# Patient Record
Sex: FEMALE | Race: WHITE | Employment: OTHER | URBAN - METROPOLITAN AREA
[De-identification: names, ages, dates, MRNs, and addresses within clinical notes are randomized per-mention and may not be internally consistent; named-entity substitution may affect disease eponyms.]

---

## 2017-01-12 PROBLEM — G40.909 SEIZURE DISORDER (HCC): Status: ACTIVE | Noted: 2017-01-12

## 2017-01-12 PROBLEM — E03.9 HYPOTHYROIDISM IN ADULT: Status: ACTIVE | Noted: 2017-01-12

## 2017-01-12 PROBLEM — E55.9 VITAMIN D DEFICIENCY: Status: ACTIVE | Noted: 2017-01-12

## 2017-01-12 PROBLEM — Z72.0 TOBACCO USE: Status: ACTIVE | Noted: 2017-01-12

## 2017-01-12 PROBLEM — I10 ESSENTIAL HYPERTENSION: Status: ACTIVE | Noted: 2017-01-12

## 2017-01-12 PROBLEM — K21.9 GASTROESOPHAGEAL REFLUX DISEASE: Status: ACTIVE | Noted: 2017-01-12

## 2017-01-12 PROBLEM — E78.2 MIXED HYPERLIPIDEMIA: Status: ACTIVE | Noted: 2017-01-12

## 2017-01-28 PROBLEM — Z91.51 HISTORY OF SUICIDE ATTEMPT: Status: ACTIVE | Noted: 2017-01-28

## 2018-02-01 PROBLEM — F32.9 DEPRESSION, MAJOR, SINGLE EPISODE: Status: ACTIVE | Noted: 2018-02-01

## 2018-02-13 PROBLEM — F32.A DEPRESSIVE DISORDER: Status: ACTIVE | Noted: 2018-02-13

## 2018-03-15 ENCOUNTER — TELEPHONE (OUTPATIENT)
Dept: FAMILY MEDICINE CLINIC | Age: 48
End: 2018-03-15

## 2018-03-15 ENCOUNTER — HOSPITAL ENCOUNTER (OUTPATIENT)
Age: 48
Discharge: HOME | End: 2018-03-15
Payer: COMMERCIAL

## 2018-03-15 DIAGNOSIS — E87.6 HYPOKALEMIA: Primary | ICD-10-CM

## 2018-03-15 LAB
ANION GAP SERPL CALCULATED.3IONS-SCNC: 21 MMOL/L (ref 7–16)
BUN BLDV-MCNC: 5 MG/DL (ref 6–20)
CALCIUM SERPL-MCNC: 9.3 MG/DL (ref 8.6–10.2)
CHLORIDE BLD-SCNC: 105 MMOL/L (ref 98–107)
CO2: 15 MMOL/L (ref 22–29)
CREAT SERPL-MCNC: 0.6 MG/DL (ref 0.5–1)
GFR AFRICAN AMERICAN: >60
GFR NON-AFRICAN AMERICAN: >60 ML/MIN/1.73
GLUCOSE BLD-MCNC: 152 MG/DL (ref 74–109)
LITHIUM DOSE AMOUNT: NORMAL
LITHIUM LEVEL: 0.95 MMOL/L (ref 0.5–1.5)
POTASSIUM SERPL-SCNC: 3 MMOL/L (ref 3.5–5)
SODIUM BLD-SCNC: 141 MMOL/L (ref 132–146)
T3 FREE: 2.4 PG/ML (ref 2–4.4)
T4 FREE: 1.3 NG/DL (ref 0.93–1.7)
TSH SERPL DL<=0.05 MIU/L-ACNC: 2.38 UIU/ML (ref 0.27–4.2)

## 2018-03-15 PROCEDURE — 80048 BASIC METABOLIC PNL TOTAL CA: CPT

## 2018-03-15 PROCEDURE — 84443 ASSAY THYROID STIM HORMONE: CPT

## 2018-03-15 PROCEDURE — 84481 FREE ASSAY (FT-3): CPT

## 2018-03-15 PROCEDURE — 84439 ASSAY OF FREE THYROXINE: CPT

## 2018-03-15 PROCEDURE — 36415 COLL VENOUS BLD VENIPUNCTURE: CPT

## 2018-03-15 PROCEDURE — 80178 ASSAY OF LITHIUM: CPT

## 2018-03-15 RX ORDER — POTASSIUM CHLORIDE 20 MEQ/1
20 TABLET, EXTENDED RELEASE ORAL DAILY
Qty: 5 TABLET | Refills: 1 | Status: SHIPPED | OUTPATIENT
Start: 2018-03-15 | End: 2018-06-01 | Stop reason: ALTCHOICE

## 2018-03-15 NOTE — TELEPHONE ENCOUNTER
----- Message from Calli Pereira DO sent at 3/15/2018  2:07 PM EDT -----  Regarding: low potassium   Labs were forwarded to me that were ordered by another provider -does not state who this provider is but the patient's potassium is low - she needs replacement potassium.    Just need to see if the provider who ordered these labs has received the results and who that provider is -       ----- Message -----  From: Shanae Lang Incoming Results From [facility]  Sent: 3/15/2018   7:05 AM  To: Calli Pereira DO

## 2018-03-15 NOTE — TELEPHONE ENCOUNTER
Spoke with pt and faxed labs to Dr. Scarlett Valdovinos (psych). Please advise if you plan on addressing the potassium?   I did ask their office to give us a call back if they were going to address. (p# [phone])

## 2018-03-22 ENCOUNTER — TELEPHONE (OUTPATIENT)
Dept: FAMILY MEDICINE CLINIC | Age: 48
End: 2018-03-22

## 2018-03-25 ENCOUNTER — HOSPITAL ENCOUNTER (EMERGENCY)
Age: 48
Discharge: HOME | End: 2018-03-25
Attending: EMERGENCY MEDICINE
Payer: COMMERCIAL

## 2018-03-25 ENCOUNTER — HOSPITAL ENCOUNTER (INPATIENT)
Age: 48
LOS: 2 days | Discharge: HOME | DRG: 885 | End: 2018-03-28
Attending: EMERGENCY MEDICINE | Admitting: PSYCHIATRY & NEUROLOGY
Payer: COMMERCIAL

## 2018-03-25 VITALS
RESPIRATION RATE: 16 BRPM | TEMPERATURE: 98.5 F | HEIGHT: 63 IN | DIASTOLIC BLOOD PRESSURE: 93 MMHG | OXYGEN SATURATION: 97 % | SYSTOLIC BLOOD PRESSURE: 125 MMHG | WEIGHT: 202 LBS | BODY MASS INDEX: 35.79 KG/M2 | HEART RATE: 84 BPM

## 2018-03-25 DIAGNOSIS — F32.A DEPRESSION, UNSPECIFIED DEPRESSION TYPE: Primary | ICD-10-CM

## 2018-03-25 DIAGNOSIS — R45.851 SUICIDAL IDEATION: ICD-10-CM

## 2018-03-25 DIAGNOSIS — R45.851 SUICIDAL IDEATIONS: ICD-10-CM

## 2018-03-25 DIAGNOSIS — T14.91XA SUICIDE ATTEMPT (HCC): Primary | ICD-10-CM

## 2018-03-25 LAB
ACETAMINOPHEN LEVEL: <15 MCG/ML (ref 10–30)
ACETAMINOPHEN LEVEL: <15 MCG/ML (ref 10–30)
ALBUMIN SERPL-MCNC: 4.2 G/DL (ref 3.5–5.2)
ALBUMIN SERPL-MCNC: 4.4 G/DL (ref 3.5–5.2)
ALP BLD-CCNC: 103 U/L (ref 35–104)
ALP BLD-CCNC: 103 U/L (ref 35–104)
ALT SERPL-CCNC: 11 U/L (ref 0–32)
ALT SERPL-CCNC: 8 U/L (ref 0–32)
AMPHETAMINE SCREEN, URINE: NOT DETECTED
AMPHETAMINE SCREEN, URINE: NOT DETECTED
ANION GAP SERPL CALCULATED.3IONS-SCNC: 14 MMOL/L (ref 7–16)
ANION GAP SERPL CALCULATED.3IONS-SCNC: 14 MMOL/L (ref 7–16)
AST SERPL-CCNC: 12 U/L (ref 0–31)
AST SERPL-CCNC: 9 U/L (ref 0–31)
BARBITURATE SCREEN URINE: NOT DETECTED
BARBITURATE SCREEN URINE: NOT DETECTED
BASOPHILS ABSOLUTE: 0.06 E9/L (ref 0–0.2)
BASOPHILS ABSOLUTE: 0.1 E9/L (ref 0–0.2)
BASOPHILS RELATIVE PERCENT: 0.8 % (ref 0–2)
BASOPHILS RELATIVE PERCENT: 0.9 % (ref 0–2)
BENZODIAZEPINE SCREEN, URINE: NOT DETECTED
BENZODIAZEPINE SCREEN, URINE: NOT DETECTED
BILIRUB SERPL-MCNC: 0.3 MG/DL (ref 0–1.2)
BILIRUB SERPL-MCNC: <0.2 MG/DL (ref 0–1.2)
BILIRUBIN URINE: NEGATIVE
BILIRUBIN URINE: NEGATIVE
BLOOD, URINE: NEGATIVE
BLOOD, URINE: NEGATIVE
BUN BLDV-MCNC: 7 MG/DL (ref 6–20)
BUN BLDV-MCNC: 7 MG/DL (ref 6–20)
CALCIUM SERPL-MCNC: 9.5 MG/DL (ref 8.6–10.2)
CALCIUM SERPL-MCNC: 9.7 MG/DL (ref 8.6–10.2)
CANNABINOID SCREEN URINE: NOT DETECTED
CANNABINOID SCREEN URINE: NOT DETECTED
CHLORIDE BLD-SCNC: 101 MMOL/L (ref 98–107)
CHLORIDE BLD-SCNC: 99 MMOL/L (ref 98–107)
CHP ED QC CHECK: YES
CLARITY: CLEAR
CLARITY: CLEAR
CO2: 21 MMOL/L (ref 22–29)
CO2: 23 MMOL/L (ref 22–29)
COCAINE METABOLITE SCREEN URINE: NOT DETECTED
COCAINE METABOLITE SCREEN URINE: NOT DETECTED
COLOR: YELLOW
COLOR: YELLOW
CREAT SERPL-MCNC: 0.6 MG/DL (ref 0.5–1)
CREAT SERPL-MCNC: 0.6 MG/DL (ref 0.5–1)
EKG ATRIAL RATE: 64 BPM
EKG ATRIAL RATE: 69 BPM
EKG P AXIS: 50 DEGREES
EKG P AXIS: 57 DEGREES
EKG P-R INTERVAL: 178 MS
EKG P-R INTERVAL: 192 MS
EKG Q-T INTERVAL: 406 MS
EKG Q-T INTERVAL: 412 MS
EKG QRS DURATION: 90 MS
EKG QRS DURATION: 94 MS
EKG QTC CALCULATION (BAZETT): 425 MS
EKG QTC CALCULATION (BAZETT): 435 MS
EKG R AXIS: 24 DEGREES
EKG R AXIS: 24 DEGREES
EKG T AXIS: 114 DEGREES
EKG T AXIS: 95 DEGREES
EKG VENTRICULAR RATE: 64 BPM
EKG VENTRICULAR RATE: 69 BPM
EOSINOPHILS ABSOLUTE: 0.22 E9/L (ref 0.05–0.5)
EOSINOPHILS ABSOLUTE: 0.32 E9/L (ref 0.05–0.5)
EOSINOPHILS RELATIVE PERCENT: 2.8 % (ref 0–6)
EOSINOPHILS RELATIVE PERCENT: 3 % (ref 0–6)
ETHANOL: <10 MG/DL (ref 0–0.08)
ETHANOL: <10 MG/DL (ref 0–0.08)
GFR AFRICAN AMERICAN: >60
GFR AFRICAN AMERICAN: >60
GFR NON-AFRICAN AMERICAN: >60 ML/MIN/1.73
GFR NON-AFRICAN AMERICAN: >60 ML/MIN/1.73
GLUCOSE BLD-MCNC: 133 MG/DL (ref 74–109)
GLUCOSE BLD-MCNC: 94 MG/DL (ref 74–109)
GLUCOSE URINE: NEGATIVE MG/DL
GLUCOSE URINE: NEGATIVE MG/DL
HCG(URINE) PREGNANCY TEST: NEGATIVE
HCT VFR BLD CALC: 42.5 % (ref 34–48)
HCT VFR BLD CALC: 43.5 % (ref 34–48)
HEMOGLOBIN: 13.6 G/DL (ref 11.5–15.5)
HEMOGLOBIN: 14.1 G/DL (ref 11.5–15.5)
IMMATURE GRANULOCYTES #: 0.03 E9/L
IMMATURE GRANULOCYTES #: 0.04 E9/L
IMMATURE GRANULOCYTES %: 0.4 % (ref 0–5)
IMMATURE GRANULOCYTES %: 0.4 % (ref 0–5)
KETONES, URINE: NEGATIVE MG/DL
KETONES, URINE: NEGATIVE MG/DL
LEUKOCYTE ESTERASE, URINE: NEGATIVE
LEUKOCYTE ESTERASE, URINE: NEGATIVE
LITHIUM DOSE AMOUNT: NORMAL
LITHIUM LEVEL: 1.47 MMOL/L (ref 0.5–1.5)
LYMPHOCYTES ABSOLUTE: 1.73 E9/L (ref 1.5–4)
LYMPHOCYTES ABSOLUTE: 3.34 E9/L (ref 1.5–4)
LYMPHOCYTES RELATIVE PERCENT: 22.3 % (ref 20–42)
LYMPHOCYTES RELATIVE PERCENT: 31.4 % (ref 20–42)
MCH RBC QN AUTO: 30 PG (ref 26–35)
MCH RBC QN AUTO: 30.2 PG (ref 26–35)
MCHC RBC AUTO-ENTMCNC: 32 % (ref 32–34.5)
MCHC RBC AUTO-ENTMCNC: 32.4 % (ref 32–34.5)
MCV RBC AUTO: 93.1 FL (ref 80–99.9)
MCV RBC AUTO: 93.8 FL (ref 80–99.9)
METHADONE SCREEN, URINE: NOT DETECTED
METHADONE SCREEN, URINE: NOT DETECTED
MONOCYTES ABSOLUTE: 0.36 E9/L (ref 0.1–0.95)
MONOCYTES ABSOLUTE: 0.78 E9/L (ref 0.1–0.95)
MONOCYTES RELATIVE PERCENT: 4.6 % (ref 2–12)
MONOCYTES RELATIVE PERCENT: 7.3 % (ref 2–12)
NEUTROPHILS ABSOLUTE: 5.36 E9/L (ref 1.8–7.3)
NEUTROPHILS ABSOLUTE: 6.07 E9/L (ref 1.8–7.3)
NEUTROPHILS RELATIVE PERCENT: 57 % (ref 43–80)
NEUTROPHILS RELATIVE PERCENT: 69.1 % (ref 43–80)
NITRITE, URINE: NEGATIVE
NITRITE, URINE: NEGATIVE
OPIATE SCREEN URINE: NOT DETECTED
OPIATE SCREEN URINE: NOT DETECTED
PDW BLD-RTO: 13 FL (ref 11.5–15)
PDW BLD-RTO: 13.2 FL (ref 11.5–15)
PH UA: 5.5 (ref 5–9)
PH UA: 6.5 (ref 5–9)
PHENCYCLIDINE SCREEN URINE: NOT DETECTED
PHENCYCLIDINE SCREEN URINE: NOT DETECTED
PLATELET # BLD: 386 E9/L (ref 130–450)
PLATELET # BLD: 415 E9/L (ref 130–450)
PMV BLD AUTO: 9.4 FL (ref 7–12)
PMV BLD AUTO: 9.4 FL (ref 7–12)
POTASSIUM SERPL-SCNC: 3.9 MMOL/L (ref 3.5–5)
POTASSIUM SERPL-SCNC: 4.3 MMOL/L (ref 3.5–5)
PREGNANCY TEST URINE, POC: NEGATIVE
PROPOXYPHENE SCREEN: NOT DETECTED
PROPOXYPHENE SCREEN: NOT DETECTED
PROTEIN UA: NEGATIVE MG/DL
PROTEIN UA: NEGATIVE MG/DL
RBC # BLD: 4.53 E12/L (ref 3.5–5.5)
RBC # BLD: 4.67 E12/L (ref 3.5–5.5)
SALICYLATE, SERUM: <0.3 MG/DL (ref 0–30)
SALICYLATE, SERUM: <0.3 MG/DL (ref 0–30)
SODIUM BLD-SCNC: 134 MMOL/L (ref 132–146)
SODIUM BLD-SCNC: 138 MMOL/L (ref 132–146)
SPECIFIC GRAVITY UA: <=1.005 (ref 1–1.03)
SPECIFIC GRAVITY UA: <=1.005 (ref 1–1.03)
T4 TOTAL: 8.1 MCG/DL (ref 4.5–11.7)
T4 TOTAL: 8.3 MCG/DL (ref 4.5–11.7)
TOTAL PROTEIN: 7 G/DL (ref 6.4–8.3)
TOTAL PROTEIN: 7.3 G/DL (ref 6.4–8.3)
TRICYCLIC ANTIDEPRESSANTS SCREEN SERUM: NEGATIVE NG/ML
TRICYCLIC ANTIDEPRESSANTS SCREEN SERUM: NEGATIVE NG/ML
TSH SERPL DL<=0.05 MIU/L-ACNC: 1.39 UIU/ML (ref 0.27–4.2)
TSH SERPL DL<=0.05 MIU/L-ACNC: 2.4 UIU/ML (ref 0.27–4.2)
UROBILINOGEN, URINE: 0.2 E.U./DL
UROBILINOGEN, URINE: 0.2 E.U./DL
WBC # BLD: 10.7 E9/L (ref 4.5–11.5)
WBC # BLD: 7.8 E9/L (ref 4.5–11.5)

## 2018-03-25 PROCEDURE — 80053 COMPREHEN METABOLIC PANEL: CPT

## 2018-03-25 PROCEDURE — 85025 COMPLETE CBC W/AUTO DIFF WBC: CPT

## 2018-03-25 PROCEDURE — 81003 URINALYSIS AUTO W/O SCOPE: CPT

## 2018-03-25 PROCEDURE — 36415 COLL VENOUS BLD VENIPUNCTURE: CPT

## 2018-03-25 PROCEDURE — 93005 ELECTROCARDIOGRAM TRACING: CPT | Performed by: NURSE PRACTITIONER

## 2018-03-25 PROCEDURE — 80307 DRUG TEST PRSMV CHEM ANLYZR: CPT

## 2018-03-25 PROCEDURE — 84436 ASSAY OF TOTAL THYROXINE: CPT

## 2018-03-25 PROCEDURE — 81025 URINE PREGNANCY TEST: CPT

## 2018-03-25 PROCEDURE — 84443 ASSAY THYROID STIM HORMONE: CPT

## 2018-03-25 PROCEDURE — 99285 EMERGENCY DEPT VISIT HI MDM: CPT

## 2018-03-25 PROCEDURE — 6370000000 HC RX 637 (ALT 250 FOR IP): Performed by: EMERGENCY MEDICINE

## 2018-03-25 PROCEDURE — 80178 ASSAY OF LITHIUM: CPT

## 2018-03-25 PROCEDURE — 6370000000 HC RX 637 (ALT 250 FOR IP): Performed by: NURSE PRACTITIONER

## 2018-03-25 RX ORDER — CARVEDILOL 6.25 MG/1
3.12 TABLET ORAL ONCE
Status: COMPLETED | OUTPATIENT
Start: 2018-03-25 | End: 2018-03-25

## 2018-03-25 RX ORDER — DOCUSATE SODIUM 100 MG/1
100 CAPSULE, LIQUID FILLED ORAL DAILY
COMMUNITY
End: 2018-03-25

## 2018-03-25 RX ORDER — HYDROXYZINE 50 MG/1
50 TABLET, FILM COATED ORAL EVERY 8 HOURS PRN
Status: ON HOLD | COMMUNITY
End: 2019-07-08 | Stop reason: HOSPADM

## 2018-03-25 RX ORDER — LORAZEPAM 1 MG/1
2 TABLET ORAL ONCE
Status: COMPLETED | OUTPATIENT
Start: 2018-03-25 | End: 2018-03-25

## 2018-03-25 RX ORDER — LITHIUM CARBONATE 300 MG/1
600 CAPSULE ORAL ONCE
Status: COMPLETED | OUTPATIENT
Start: 2018-03-25 | End: 2018-03-25

## 2018-03-25 RX ORDER — LORATADINE 10 MG/1
10 TABLET ORAL DAILY
COMMUNITY
End: 2018-03-25

## 2018-03-25 RX ADMIN — LITHIUM CARBONATE 600 MG: 300 CAPSULE ORAL at 23:07

## 2018-03-25 RX ADMIN — LORAZEPAM 2 MG: 1 TABLET ORAL at 19:03

## 2018-03-25 RX ADMIN — CARVEDILOL 3.12 MG: 6.25 TABLET, FILM COATED ORAL at 20:35

## 2018-03-25 ASSESSMENT — PATIENT HEALTH QUESTIONNAIRE - PHQ9: SUM OF ALL RESPONSES TO PHQ QUESTIONS 1-9: 4

## 2018-03-25 NOTE — ED NOTES
Assessment, CSSR & SBIRT completed. Pt is a 53 yo female who arrived via ambulance, is on a pink slip completed by [facility] and is accompanied by no one. Pt was treated earlier this date in ED for suicidal ideation w/ thoughts of stabbing herself with a  knife, evaluated and discharged to group home. Pt reports when she returned home there was another person at the group talking about  dying. Pt reports her  is also  and she began to be upset. She then began to think about her father dying last July and suicidal thoughts began again strongly. Pt voiced these to caregiver at the home and began to talk about stabbing herself with the  knife again. Police were called but when they arrived pt ran upstairs and grabbed a pillow case and was attempting to hang/strangle herself, combative with police when they attempted to stop her. Active with [facility], sees Aspen Murphy, reports recently he has been adjusting her medication recently discontinuing oral Haldol. Hx of schizoaffective disorder, borderline personality disorder. Hx on [facility], last was 2018. Alert, oriented x3, mood reported as depressed, affect flat, eye contact good, speech is soft but normal in rate and flow, behavior is cooperative, appropriate, no signs of agitation, denies acute psychosis A/V hallucinations, delusions, paranoia at this time, none noted in interview, does report suicidal ideation with plan to stab self, reports at times these thoughts are strong and she is unsure she can control them. Denies homicidal ideation intent or plan. Discussion with Dr Jefferson Platt, reports that since pt had episode earlier this date of suicidal thought/thoughts of stabbing self, and considering events leading to current admission he is uncomfortable with discharge. SW will pursue in-patient treatment.         [address], [place], [place]  18

## 2018-03-25 NOTE — PROGRESS NOTES
Spoke with dr. Sridhar Yanes to overturn pink slip he did not feel comfortable doing so since he did not know pt. Will call dr. Gerhard Mcduffie.

## 2018-03-25 NOTE — PROGRESS NOTES
spoke with cassandra from [facility]'s pharmacy verified her home medications, also checked with [facility] they stated pt. Was not there today. Denies suicidal ideation, denies homicidal ideation, and denies hallucinations.

## 2018-03-25 NOTE — ED PROVIDER NOTES
ED Attending  CC: Madhuri       Department of Emergency Medicine   ED  Provider Note  Admit Date/RoomTime: 3/25/2018  8:50 AM  ED Room: [address] [place] [address]   Chief Complaint   Mental Health Problem (pt. states she has thoughts of suicide to cut herself with a  knife, denies homicidal ideatioon, and denies hallucinations)    History of Present Illness   Source of history provided by:  patient. History/Exam Limitations: none. Alto Gowers is a 52 y.o. old female who has a past medical history of:   Past Medical History:   Diagnosis Date    B[facility] disorder ([facility]     Borderline personality disorder in adolescent     Depression     Hyperlipidemia     Hypertension     PTSD (post-traumatic stress disorder)     Schizo affective schizophrenia ([facility]     Seizures ([facility]     Thyroid disease     presents to the emergency department by ambulance where the patient received see Ambulance Run Sheet prior to arrival., for psychiatric evaluation/medical clearance and depression which began several year(s) prior to arrival.  She has a prior history of depression of which the problem is long-standing. Her reported drug use history: Never. She  is currently in counseling. There has been no history of recent trauma . She admits to suicidal ideation with a plan to slit her throat with a  knife and denies homicidal ideation. Quality:      Hopelessness:   Yes. Terror:   No.     Confusion:   No.     Hallucinations:   None. Able to care for self: No.  Able to control self: Yes. ROS    Pertinent positives and negatives are stated within HPI, all other systems reviewed and are negative. Past Surgical History:  has a past surgical history that includes joint replacement; Cholecystectomy; and shoulder surgery. Social History:  reports that she has been smoking. She has a 3.75 pack-year smoking history. She has never used smokeless tobacco. She reports that she does not drink alcohol or use drugs.   Family History: family history includes High Blood Pressure in her mother; No Known Problems in her father. Allergies: Adhesive tape    Physical Exam           ED Triage Vitals [03/25/18 0905]   BP Temp Temp Source Pulse Resp SpO2 Height Weight   (!) 125/93 98.5 °F (36.9 °C) Tympanic 84 16 97 % 5' 3\" (1.6 m) 202 lb (91.6 kg)      Oxygen Saturation Interpretation: Normal.    General Appearance:  hospital attire, seated in bed and holding a stuffed turtle. Constitutional:   Level of Consciousness: Awake and alert. ETOH: No.          Distress: none. Cooperativeness: cooperative. Eyes:  PERRL, EOMI, no discharge or conjunctival injection. Ears:  External ears without lesions. Throat:  Pharynx without injection, exudate, or tonsillar hypertrophy. Airway patient. Neck:  Normal ROM. Supple. Respiratory:  Clear to auscultation and breath sounds equal.  CV:  Regular rate and rhythm, normal heart sounds, without pathological murmurs, ectopy, gallops, or rubs. GI:  Abdomen Soft, nontender, good bowel sounds. No firm or pulsatile mass. Back:  No costovertebral tenderness. Integument:  Normal turgor. Warm, dry, without visible rash, unless noted elsewhere. Lymphatics: No lymphangitis or adenopathy noted. Neurological:  Oriented. Motor functions intact. Psychiatric:        Thought Process:       Coherent:  Yes. Delusions / Paranoia: no evidence of paranoia. Flight of ideas:  No.         Rambling conversation:  No.       Affect: depressed, quiet and flat. Suicidal ideation:  suicidal ideation with clear plan and intent. Homicidal ideation:  no homicidal ideation. Perceptions:  denies any perceptual disturbance present. Insight: below average. Judgement: below average.     Lab / Imaging Results   (All laboratory and radiology results have been personally reviewed by myself)  Labs:  Results for orders placed or performed during the hospital encounter of 03/25/18   CBC auto differential   Result Value Ref Range    WBC 7.8 4.5 - 11.5 E9/L    RBC 4.67 3.50 - 5.50 E12/L    Hemoglobin 14.1 11.5 - 15.5 g/dL    Hematocrit 43.5 34.0 - 48.0 %    MCV 93.1 80.0 - 99.9 fL    MCH 30.2 26.0 - 35.0 pg    MCHC 32.4 32.0 - 34.5 %    RDW 13.2 11.5 - 15.0 fL    Platelets 026 940 - 140 E9/L    MPV 9.4 7.0 - 12.0 fL    Neutrophils % 69.1 43.0 - 80.0 %    Immature Granulocytes % 0.4 0.0 - 5.0 %    Lymphocytes % 22.3 20.0 - 42.0 %    Monocytes % 4.6 2.0 - 12.0 %    Eosinophils % 2.8 0.0 - 6.0 %    Basophils % 0.8 0.0 - 2.0 %    Neutrophils # 5.36 1.80 - 7.30 E9/L    Immature Granulocytes # 0.03 E9/L    Lymphocytes # 1.73 1.50 - 4.00 E9/L    Monocytes # 0.36 0.10 - 0.95 E9/L    Eosinophils # 0.22 0.05 - 0.50 E9/L    Basophils # 0.06 0.00 - 0.20 E9/L   Comprehensive Metabolic Panel   Result Value Ref Range    Sodium 138 132 - 146 mmol/L    Potassium 4.3 3.5 - 5.0 mmol/L    Chloride 101 98 - 107 mmol/L    CO2 23 22 - 29 mmol/L    Anion Gap 14 7 - 16 mmol/L    Glucose 133 (H) 74 - 109 mg/dL    BUN 7 6 - 20 mg/dL    CREATININE 0.6 0.5 - 1.0 mg/dL    GFR Non-African American >60 >=60 mL/min/1.73    GFR African American >60     Calcium 9.7 8.6 - 10.2 mg/dL    Total Protein 7.3 6.4 - 8.3 g/dL    Alb 4.4 3.5 - 5.2 g/dL    Total Bilirubin 0.3 0.0 - 1.2 mg/dL    Alkaline Phosphatase 103 35 - 104 U/L    ALT 8 0 - 32 U/L    AST 9 0 - 31 U/L   TSH without Reflex   Result Value Ref Range    TSH 1.390 0.270 - 4.200 uIU/mL   Urinalysis   Result Value Ref Range    Color, UA Yellow Straw/Yellow    Clarity, UA Clear Clear    Glucose, Ur Negative Negative mg/dL    Bilirubin Urine Negative Negative    Ketones, Urine Negative Negative mg/dL    Specific Gravity, UA <=1.005 1.005 - 1.030    Blood, Urine Negative Negative    pH, UA 6.5 5.0 - 9.0    Protein, UA Negative Negative mg/dL    Urobilinogen, Urine 0.2 <2.0 E.U./dL    Nitrite, Urine Negative Negative    Leukocyte Esterase, Urine Negative Negative   Serum addition to providing specific details for the plan of care and counseling regarding the diagnosis and prognosis. Questions are answered at this time and they are agreeable with the plan    Assessment      1. Depression, unspecified depression type    2. Suicidal ideations      Plan   Per psych  Patient condition is good    New Medications     New Prescriptions    No medications on file     Electronically signed by Dione Gu CNP   DD: 3/25/18  **This report was transcribed using voice recognition software. Every effort was made to ensure accuracy; however, inadvertent computerized transcription errors may be present.   END OF ED PROVIDER NOTE       Dione Gu CNP  03/25/18 1110

## 2018-03-25 NOTE — ED PROVIDER NOTES
are negative. Past Surgical History:  has a past surgical history that includes joint replacement; Cholecystectomy; and shoulder surgery. Social History:  reports that she has been smoking. She has a 3.75 pack-year smoking history. She has never used smokeless tobacco. She reports that she does not drink alcohol or use drugs. Family History: family history includes High Blood Pressure in her mother; No Known Problems in her father. Allergies: Adhesive tape    Physical Exam           ED Triage Vitals [03/25/18 1622]   BP Temp Temp src Pulse Resp SpO2 Height Weight   (!) 146/97 97.3 °F (36.3 °C) -- 78 16 96 % -- --      Oxygen Saturation Interpretation: Normal.    General Appearance:  well-appearing, street clothes, seated in bed, fair grooming and fair hygiene. Constitutional:   Level of Consciousness: Awake and alert. ETOH: No.          Distress: none. Cooperativeness: cooperative. Eyes:  PERRL, EOMI, no discharge or conjunctival injection. Ears:  External ears without lesions. Throat:  Pharynx without injection, exudate, or tonsillar hypertrophy. Airway patient. No pulled secretions in the oropharynx. She is phonating normally. Neck:  Normal ROM. No pain with axial loading. There is no midline vertebral tenderness through the cervical spine. There is no auscultated stridor over the trachea. No carotid bruits bilaterally. Strong carotid pulse bilaterally. Supple. There is trace erythema to the anterior mid neck, none to the lateral or posterior. No ecchymosis. Respiratory:  Clear to auscultation and breath sounds equal. No respiratory distress or increased work of breathing. CV:  Regular rate and rhythm, normal heart sounds, without pathological murmurs, ectopy, gallops, or rubs. No tachycardia. GI:  Abdomen Soft, nontender, good bowel sounds. No firm or pulsatile mass. Back:  No costovertebral tenderness. Integument:  Normal turgor.   Warm, dry, without visible rash, unless noted elsewhere. Lymphatics: No lymphangitis or adenopathy noted. Neurological:  Oriented. Motor functions intact. Grasp, dorsiflexion and plantar flexion strong and equal bilaterally. Clear speech. Psychiatric:        Thought Process:       Coherent:  Yes. Delusions / Paranoia: paranoid. Flight of ideas:  No.         Rambling conversation:  No.       Affect: anxious, fearful and labile. Suicidal ideation:  suicidal ideation with clear plan and intent. Homicidal ideation:  no homicidal ideation. Perceptions:  auditory present. Insight: below average. Judgement: below average.     Lab / Imaging Results   (All laboratory and radiology results have been personally reviewed by myself)  Labs:  Results for orders placed or performed during the hospital encounter of 03/25/18   Comprehensive Metabolic Panel   Result Value Ref Range    Sodium 134 132 - 146 mmol/L    Potassium 3.9 3.5 - 5.0 mmol/L    Chloride 99 98 - 107 mmol/L    CO2 21 (L) 22 - 29 mmol/L    Anion Gap 14 7 - 16 mmol/L    Glucose 94 74 - 109 mg/dL    BUN 7 6 - 20 mg/dL    CREATININE 0.6 0.5 - 1.0 mg/dL    GFR Non-African American >60 >=60 mL/min/1.73    GFR African American >60     Calcium 9.5 8.6 - 10.2 mg/dL    Total Protein 7.0 6.4 - 8.3 g/dL    Alb 4.2 3.5 - 5.2 g/dL    Total Bilirubin <0.2 0.0 - 1.2 mg/dL    Alkaline Phosphatase 103 35 - 104 U/L    ALT 11 0 - 32 U/L    AST 12 0 - 31 U/L   CBC Auto Differential   Result Value Ref Range    WBC 10.7 4.5 - 11.5 E9/L    RBC 4.53 3.50 - 5.50 E12/L    Hemoglobin 13.6 11.5 - 15.5 g/dL    Hematocrit 42.5 34.0 - 48.0 %    MCV 93.8 80.0 - 99.9 fL    MCH 30.0 26.0 - 35.0 pg    MCHC 32.0 32.0 - 34.5 %    RDW 13.0 11.5 - 15.0 fL    Platelets 815 550 - 217 E9/L    MPV 9.4 7.0 - 12.0 fL    Neutrophils % 57.0 43.0 - 80.0 %    Immature Granulocytes % 0.4 0.0 - 5.0 %    Lymphocytes % 31.4 20.0 - 42.0 %    Monocytes % 7.3 2.0 - 12.0 %    Eosinophils % 3.0 0.0 - 6.0 %

## 2018-03-25 NOTE — PROGRESS NOTES
Denies suicidal ideation, denies homicidal ideation, and denies hallucinations. Discharge instructions given to and explained to pt. She verbalizes intent to follow discharge instructions and has appointments already made, positive plans for discharge.

## 2018-03-25 NOTE — ED NOTES
Per , patient denies SI/HI. Called group home, confirmed there are no knives available to people in group home.  also confirmed with group home that it is safe to send her back. Poses no threat to self or others at this time.       Raiza Medel, DO  03/25/18 3020

## 2018-03-26 PROCEDURE — 1240000000 HC EMOTIONAL WELLNESS R&B

## 2018-03-26 PROCEDURE — 6370000000 HC RX 637 (ALT 250 FOR IP): Performed by: EMERGENCY MEDICINE

## 2018-03-26 PROCEDURE — 6370000000 HC RX 637 (ALT 250 FOR IP): Performed by: PSYCHIATRY & NEUROLOGY

## 2018-03-26 RX ORDER — LEVOTHYROXINE SODIUM 0.1 MG/1
100 TABLET ORAL ONCE
Status: COMPLETED | OUTPATIENT
Start: 2018-03-26 | End: 2018-03-26

## 2018-03-26 RX ORDER — ATORVASTATIN CALCIUM 20 MG/1
20 TABLET, FILM COATED ORAL DAILY
Status: DISCONTINUED | OUTPATIENT
Start: 2018-03-26 | End: 2018-03-28 | Stop reason: HOSPADM

## 2018-03-26 RX ORDER — POTASSIUM CHLORIDE 20 MEQ/1
20 TABLET, EXTENDED RELEASE ORAL DAILY
Status: DISCONTINUED | OUTPATIENT
Start: 2018-03-26 | End: 2018-03-28 | Stop reason: HOSPADM

## 2018-03-26 RX ORDER — POTASSIUM CHLORIDE 20 MEQ/1
20 TABLET, EXTENDED RELEASE ORAL ONCE
Status: COMPLETED | OUTPATIENT
Start: 2018-03-26 | End: 2018-03-26

## 2018-03-26 RX ORDER — VENLAFAXINE HYDROCHLORIDE 75 MG/1
225 CAPSULE, EXTENDED RELEASE ORAL ONCE
Status: COMPLETED | OUTPATIENT
Start: 2018-03-26 | End: 2018-03-26

## 2018-03-26 RX ORDER — TOPIRAMATE 100 MG/1
200 TABLET, FILM COATED ORAL 2 TIMES DAILY
Status: DISCONTINUED | OUTPATIENT
Start: 2018-03-26 | End: 2018-03-28 | Stop reason: HOSPADM

## 2018-03-26 RX ORDER — CARVEDILOL 3.12 MG/1
3.12 TABLET ORAL 2 TIMES DAILY
Status: DISCONTINUED | OUTPATIENT
Start: 2018-03-26 | End: 2018-03-28 | Stop reason: HOSPADM

## 2018-03-26 RX ORDER — LEVOTHYROXINE SODIUM 0.1 MG/1
100 TABLET ORAL DAILY
Status: DISCONTINUED | OUTPATIENT
Start: 2018-03-26 | End: 2018-03-28 | Stop reason: HOSPADM

## 2018-03-26 RX ORDER — LITHIUM CARBONATE 300 MG/1
600 CAPSULE ORAL 2 TIMES DAILY WITH MEALS
Status: DISCONTINUED | OUTPATIENT
Start: 2018-03-26 | End: 2018-03-28 | Stop reason: HOSPADM

## 2018-03-26 RX ORDER — BENZTROPINE MESYLATE 0.5 MG/1
0.5 TABLET ORAL ONCE
Status: COMPLETED | OUTPATIENT
Start: 2018-03-26 | End: 2018-03-26

## 2018-03-26 RX ORDER — TOPIRAMATE 100 MG/1
200 TABLET, FILM COATED ORAL ONCE
Status: COMPLETED | OUTPATIENT
Start: 2018-03-26 | End: 2018-03-26

## 2018-03-26 RX ORDER — LITHIUM CARBONATE 300 MG/1
600 CAPSULE ORAL ONCE
Status: COMPLETED | OUTPATIENT
Start: 2018-03-26 | End: 2018-03-26

## 2018-03-26 RX ORDER — PANTOPRAZOLE SODIUM 40 MG/1
40 TABLET, DELAYED RELEASE ORAL ONCE
Status: COMPLETED | OUTPATIENT
Start: 2018-03-26 | End: 2018-03-26

## 2018-03-26 RX ORDER — CARVEDILOL 3.12 MG/1
3.12 TABLET ORAL ONCE
Status: COMPLETED | OUTPATIENT
Start: 2018-03-26 | End: 2018-03-26

## 2018-03-26 RX ORDER — BENZTROPINE MESYLATE 0.5 MG/1
0.5 TABLET ORAL 3 TIMES DAILY
Status: DISCONTINUED | OUTPATIENT
Start: 2018-03-26 | End: 2018-03-28 | Stop reason: HOSPADM

## 2018-03-26 RX ORDER — PANTOPRAZOLE SODIUM 40 MG/1
40 TABLET, DELAYED RELEASE ORAL
Status: DISCONTINUED | OUTPATIENT
Start: 2018-03-27 | End: 2018-03-28 | Stop reason: HOSPADM

## 2018-03-26 RX ADMIN — LITHIUM CARBONATE 600 MG: 300 CAPSULE ORAL at 19:04

## 2018-03-26 RX ADMIN — POTASSIUM CHLORIDE 20 MEQ: 20 TABLET, EXTENDED RELEASE ORAL at 14:57

## 2018-03-26 RX ADMIN — TOPIRAMATE 200 MG: 100 TABLET, FILM COATED ORAL at 20:12

## 2018-03-26 RX ADMIN — LITHIUM CARBONATE 600 MG: 300 CAPSULE ORAL at 17:11

## 2018-03-26 RX ADMIN — PANTOPRAZOLE SODIUM 40 MG: 40 TABLET, DELAYED RELEASE ORAL at 14:57

## 2018-03-26 RX ADMIN — CARVEDILOL 3.12 MG: 3.12 TABLET, FILM COATED ORAL at 20:12

## 2018-03-26 RX ADMIN — LEVOTHYROXINE SODIUM 100 MCG: 100 TABLET ORAL at 14:57

## 2018-03-26 RX ADMIN — VENLAFAXINE HYDROCHLORIDE 225 MG: 75 CAPSULE, EXTENDED RELEASE ORAL at 17:13

## 2018-03-26 RX ADMIN — ATORVASTATIN CALCIUM 20 MG: 20 TABLET, FILM COATED ORAL at 19:04

## 2018-03-26 RX ADMIN — BENZTROPINE MESYLATE 0.5 MG: 0.5 TABLET ORAL at 17:11

## 2018-03-26 RX ADMIN — TOPIRAMATE 200 MG: 100 TABLET, FILM COATED ORAL at 17:11

## 2018-03-26 RX ADMIN — POTASSIUM CHLORIDE 20 MEQ: 20 TABLET, EXTENDED RELEASE ORAL at 19:04

## 2018-03-26 RX ADMIN — BENZTROPINE MESYLATE 0.5 MG: 0.5 TABLET ORAL at 20:12

## 2018-03-26 RX ADMIN — CARVEDILOL 3.12 MG: 3.12 TABLET, FILM COATED ORAL at 17:12

## 2018-03-26 ASSESSMENT — SLEEP AND FATIGUE QUESTIONNAIRES
DO YOU USE A SLEEP AID: NO
SLEEP PATTERN: NORMAL
AVERAGE NUMBER OF SLEEP HOURS: 7
DO YOU HAVE DIFFICULTY SLEEPING: NO

## 2018-03-26 ASSESSMENT — PATIENT HEALTH QUESTIONNAIRE - PHQ9: SUM OF ALL RESPONSES TO PHQ QUESTIONS 1-9: 9

## 2018-03-26 ASSESSMENT — PAIN SCALES - GENERAL: PAINLEVEL_OUTOF10: 0

## 2018-03-26 ASSESSMENT — LIFESTYLE VARIABLES: HISTORY_ALCOHOL_USE: NO

## 2018-03-26 NOTE — ED NOTES
Per CEDRICK FÉLIX Dennis, patient is accepted to [facility] by Dr. Selena Adhikari. Patient to go to room #75B. Faxed pink slip to [facility]. Called admitting and notified Melonie Thapa. Placed call to [facility] and spoke to [facility] - closed case for placement.      [place] Sethi, [facility]  03/26/18 [address], [place]  03/26/18 5254

## 2018-03-27 VITALS
BODY MASS INDEX: 35.79 KG/M2 | OXYGEN SATURATION: 98 % | TEMPERATURE: 98.7 F | SYSTOLIC BLOOD PRESSURE: 130 MMHG | HEART RATE: 73 BPM | RESPIRATION RATE: 16 BRPM | DIASTOLIC BLOOD PRESSURE: 87 MMHG | HEIGHT: 63 IN | WEIGHT: 202 LBS

## 2018-03-27 PROBLEM — F25.0 SCHIZOAFFECTIVE DISORDER, BIPOLAR TYPE (HCC): Status: ACTIVE | Noted: 2018-03-27

## 2018-03-27 PROCEDURE — 1240000000 HC EMOTIONAL WELLNESS R&B

## 2018-03-27 PROCEDURE — 6370000000 HC RX 637 (ALT 250 FOR IP): Performed by: PSYCHIATRY & NEUROLOGY

## 2018-03-27 PROCEDURE — 99221 1ST HOSP IP/OBS SF/LOW 40: CPT | Performed by: PSYCHIATRY & NEUROLOGY

## 2018-03-27 RX ADMIN — PANTOPRAZOLE SODIUM 40 MG: 40 TABLET, DELAYED RELEASE ORAL at 06:59

## 2018-03-27 RX ADMIN — POTASSIUM CHLORIDE 20 MEQ: 20 TABLET, EXTENDED RELEASE ORAL at 09:02

## 2018-03-27 RX ADMIN — LITHIUM CARBONATE 600 MG: 300 CAPSULE ORAL at 16:57

## 2018-03-27 RX ADMIN — TOPIRAMATE 200 MG: 100 TABLET, FILM COATED ORAL at 21:20

## 2018-03-27 RX ADMIN — TOPIRAMATE 200 MG: 100 TABLET, FILM COATED ORAL at 09:02

## 2018-03-27 RX ADMIN — CARVEDILOL 3.12 MG: 3.12 TABLET, FILM COATED ORAL at 09:02

## 2018-03-27 RX ADMIN — VENLAFAXINE HYDROCHLORIDE 225 MG: 150 CAPSULE, EXTENDED RELEASE ORAL at 09:02

## 2018-03-27 RX ADMIN — ATORVASTATIN CALCIUM 20 MG: 20 TABLET, FILM COATED ORAL at 21:20

## 2018-03-27 RX ADMIN — BENZTROPINE MESYLATE 0.5 MG: 0.5 TABLET ORAL at 21:20

## 2018-03-27 RX ADMIN — CARVEDILOL 3.12 MG: 3.12 TABLET, FILM COATED ORAL at 21:20

## 2018-03-27 RX ADMIN — LITHIUM CARBONATE 600 MG: 300 CAPSULE ORAL at 09:02

## 2018-03-27 RX ADMIN — BENZTROPINE MESYLATE 0.5 MG: 0.5 TABLET ORAL at 09:02

## 2018-03-27 RX ADMIN — BENZTROPINE MESYLATE 0.5 MG: 0.5 TABLET ORAL at 13:30

## 2018-03-27 RX ADMIN — VITAMIN D, TAB 1000IU (100/BT) 2000 UNITS: 25 TAB at 13:30

## 2018-03-27 RX ADMIN — LEVOTHYROXINE SODIUM 100 MCG: 100 TABLET ORAL at 06:59

## 2018-03-27 ASSESSMENT — SLEEP AND FATIGUE QUESTIONNAIRES
AVERAGE NUMBER OF SLEEP HOURS: 7
SLEEP PATTERN: NORMAL
DO YOU HAVE DIFFICULTY SLEEPING: NO
DO YOU USE A SLEEP AID: NO

## 2018-03-27 ASSESSMENT — LIFESTYLE VARIABLES: HISTORY_ALCOHOL_USE: NO

## 2018-03-27 ASSESSMENT — PAIN SCALES - GENERAL
PAINLEVEL_OUTOF10: 0
PAINLEVEL_OUTOF10: 0
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
## 2018-03-27 NOTE — PLAN OF CARE
[facility]  Initial Interdisciplinary Treatment Plan NOTE    Review Date & Time: 3/27/18 0900    Patient was not in INITIAL treatment team    Admission Type:   Admission Type:  Involuntary    Reason for admission:  Reason for Admission: depression       Estimated Length of Stay Update:  3 DAYS  Estimated Discharge Date Update:  3/30/18    PATIENT STRENGTHS:  Patient Strengths Strengths: Medication Compliance, Motivated  Patient Strengths and Limitations:Limitations: Tendency to isolate self  Addictive Behavior:Addictive Behavior  In the past 3 months, have you felt or has someone told you that you have a problem with:  : None  Do you have a history of Chemical Use?: No  Do you have a history of Alcohol Use?: No  Do you have a history of Street Drug Abuse?: No  Histroy of Prescripton Drug Abuse?: No  Medical Problems:  Past Medical History:   Diagnosis Date    B[facility] disorder ([facility]     Borderline personality disorder in adolescent     Depression     Hyperlipidemia     Hypertension     PTSD (post-traumatic stress disorder)     Schizo affective schizophrenia ([facility]     Seizures ([facility]     Thyroid disease        EDUCATION:   Learner Progress Toward Treatment Goals: Reviewed results and recommendations of this team    Method: Individual    Outcome: Verbalized understanding    PATIENT GOALS: \"THINK POSITIVE\"    PLAN/TREATMENT RECOMMENDATIONS UPDATE: SUICIDE AND SELF HARM ASSESSMENTS, SUPPORTIVE CARE, DISCHARGE TO CRISIS UNIT OR BACK TO GROUPS HOME    GOALS UPDATE:   Time frame for Short-Term Goals: 3 DAYS    Damián Lindsay RN

## 2018-03-27 NOTE — CARE COORDINATION
Population Navigator left message with Sandor Ramirez, to discuss development of community plan. Spoke with Mari Duff's . He reports that he has put in a referral for peer support. He has consulted his Supervisor and they are working on developing a community plan.    Electronically signed by Ela Montanez, [facility] on 3/27/2018 at 9:38 AM

## 2018-03-28 PROCEDURE — 6370000000 HC RX 637 (ALT 250 FOR IP): Performed by: PSYCHIATRY & NEUROLOGY

## 2018-03-28 PROCEDURE — 99238 HOSP IP/OBS DSCHRG MGMT 30/<: CPT | Performed by: PSYCHIATRY & NEUROLOGY

## 2018-03-28 RX ADMIN — VITAMIN D, TAB 1000IU (100/BT) 2000 UNITS: 25 TAB at 08:31

## 2018-03-28 RX ADMIN — POTASSIUM CHLORIDE 20 MEQ: 20 TABLET, EXTENDED RELEASE ORAL at 08:32

## 2018-03-28 RX ADMIN — TOPIRAMATE 200 MG: 100 TABLET, FILM COATED ORAL at 08:31

## 2018-03-28 RX ADMIN — LITHIUM CARBONATE 600 MG: 300 CAPSULE ORAL at 08:32

## 2018-03-28 RX ADMIN — PANTOPRAZOLE SODIUM 40 MG: 40 TABLET, DELAYED RELEASE ORAL at 06:36

## 2018-03-28 RX ADMIN — BENZTROPINE MESYLATE 0.5 MG: 0.5 TABLET ORAL at 08:32

## 2018-03-28 RX ADMIN — LEVOTHYROXINE SODIUM 100 MCG: 100 TABLET ORAL at 06:36

## 2018-03-28 RX ADMIN — VENLAFAXINE HYDROCHLORIDE 225 MG: 150 CAPSULE, EXTENDED RELEASE ORAL at 08:32

## 2018-03-28 RX ADMIN — CARVEDILOL 3.12 MG: 3.12 TABLET, FILM COATED ORAL at 08:32

## 2018-03-28 ASSESSMENT — PAIN SCALES - GENERAL: PAINLEVEL_OUTOF10: 0

## 2018-03-28 NOTE — PROGRESS NOTES
[facility]  Discharge Note    Pt discharged with followings belongings:   Dentures: None  Vision - Corrective Lenses: Glasses  Hearing Aid: None  Jewelry: Watch  Body Piercings Removed: N/A  Clothing: Footwear, Jacket / coat, Pants, Shirt, Socks  Were All Patient Medications Collected?: Not Applicable  Other Valuables: Money (Comment), Cell phone, Other (Comment) (1 cell phone, 1 , 1 pack cigarettes, 2 lighters, $2.00 and loose change, bookbag)   Valuables sent home with pt. . Valuables retrieved from safe, Security envelope number:  [id number] and returned to patient. Patient left department with Departure Mode: Other (Comment) ([id number]) via Mobility at Departure: Ambulatory, discharged to Discharged to: Group Home. Patient education on aftercare instructions: given, along with suicide crisis management plan. Information faxed to outpatient provider by . Patient verbalize understanding of AVS:   Yes with stated potential to comply to same.  .    Status EXAM upon discharge:  Status and Exam  Normal: Yes  Facial Expression: appropriate to circumstances  Affect: Congruent  Level of Consciousness: Alert  Mood:Normal: Yes  Motor Activity:Normal: Yes  Interview Behavior: Cooperative  Preception: [place] to Person, Neelam Mary to Time, [place] to Place, [place] to Situation  Attention: improved  Thought Processes: more organized  Thought Content:Normal: Yes  Hallucinations: None  Delusions: No  Memory:Normal: Yes  Insight and Judgment:  (improved)  Present Suicidal Ideation: No  Present Homicidal Ideation: No    Halima Dominguez RN

## 2018-03-30 ENCOUNTER — HOSPITAL ENCOUNTER (EMERGENCY)
Age: 48
Discharge: HOME | End: 2018-03-30
Payer: COMMERCIAL

## 2018-03-30 ENCOUNTER — APPOINTMENT (OUTPATIENT)
Dept: CT IMAGING | Age: 48
End: 2018-03-30
Payer: COMMERCIAL

## 2018-03-30 VITALS
HEIGHT: 62 IN | HEART RATE: 72 BPM | DIASTOLIC BLOOD PRESSURE: 67 MMHG | SYSTOLIC BLOOD PRESSURE: 109 MMHG | RESPIRATION RATE: 17 BRPM | TEMPERATURE: 97.7 F | OXYGEN SATURATION: 97 %

## 2018-03-30 DIAGNOSIS — S09.90XA CLOSED HEAD INJURY, INITIAL ENCOUNTER: ICD-10-CM
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
## 2018-03-30 DIAGNOSIS — M47.812 SPONDYLOSIS OF CERVICAL REGION WITHOUT MYELOPATHY OR RADICULOPATHY: ICD-10-CM

## 2018-03-30 PROCEDURE — 99284 EMERGENCY DEPT VISIT MOD MDM: CPT

## 2018-03-30 PROCEDURE — 70450 CT HEAD/BRAIN W/O DYE: CPT

## 2018-03-30 PROCEDURE — 72125 CT NECK SPINE W/O DYE: CPT

## 2018-03-31 ENCOUNTER — APPOINTMENT (OUTPATIENT)
Dept: CT IMAGING | Age: 48
DRG: 885 | End: 2018-03-31
Payer: COMMERCIAL

## 2018-03-31 ENCOUNTER — APPOINTMENT (OUTPATIENT)
Dept: GENERAL RADIOLOGY | Age: 48
DRG: 885 | End: 2018-03-31
Payer: COMMERCIAL

## 2018-03-31 ENCOUNTER — HOSPITAL ENCOUNTER (OUTPATIENT)
Age: 48
Setting detail: OBSERVATION
Discharge: PSYCHIATRIC OR SUBSTANCE USE FACILITY | DRG: 885 | End: 2018-04-03
Attending: EMERGENCY MEDICINE | Admitting: INTERNAL MEDICINE
Payer: COMMERCIAL

## 2018-03-31 DIAGNOSIS — T56.891A LITHIUM TOXICITY, ACCIDENTAL OR UNINTENTIONAL, INITIAL ENCOUNTER: Primary | ICD-10-CM

## 2018-03-31 DIAGNOSIS — R25.1 TREMOR: ICD-10-CM

## 2018-03-31 LAB
ACETAMINOPHEN LEVEL: <15 MCG/ML (ref 10–30)
ALBUMIN SERPL-MCNC: 4.4 G/DL (ref 3.5–5.2)
ALP BLD-CCNC: 147 U/L (ref 35–104)
ALT SERPL-CCNC: 28 U/L (ref 0–32)
AMPHETAMINE SCREEN, URINE: NOT DETECTED
ANION GAP SERPL CALCULATED.3IONS-SCNC: 12 MMOL/L (ref 7–16)
AST SERPL-CCNC: 14 U/L (ref 0–31)
BARBITURATE SCREEN URINE: NOT DETECTED
BASOPHILS ABSOLUTE: 0.06 E9/L (ref 0–0.2)
BASOPHILS RELATIVE PERCENT: 0.6 % (ref 0–2)
BENZODIAZEPINE SCREEN, URINE: NOT DETECTED
BILIRUB SERPL-MCNC: 0.4 MG/DL (ref 0–1.2)
BUN BLDV-MCNC: 8 MG/DL (ref 6–20)
CALCIUM SERPL-MCNC: 9.7 MG/DL (ref 8.6–10.2)
CANNABINOID SCREEN URINE: NOT DETECTED
CHLORIDE BLD-SCNC: 103 MMOL/L (ref 98–107)
CO2: 26 MMOL/L (ref 22–29)
COCAINE METABOLITE SCREEN URINE: NOT DETECTED
CREAT SERPL-MCNC: 0.8 MG/DL (ref 0.5–1)
EOSINOPHILS ABSOLUTE: 0.25 E9/L (ref 0.05–0.5)
EOSINOPHILS RELATIVE PERCENT: 2.4 % (ref 0–6)
ETHANOL: <10 MG/DL (ref 0–0.08)
GFR AFRICAN AMERICAN: >60
GFR NON-AFRICAN AMERICAN: >60 ML/MIN/1.73
GLUCOSE BLD-MCNC: 88 MG/DL (ref 74–109)
HCT VFR BLD CALC: 43.9 % (ref 34–48)
HEMOGLOBIN: 14 G/DL (ref 11.5–15.5)
IMMATURE GRANULOCYTES #: 0.04 E9/L
IMMATURE GRANULOCYTES %: 0.4 % (ref 0–5)
LITHIUM DOSE AMOUNT: ABNORMAL
LITHIUM LEVEL: 1.56 MMOL/L (ref 0.5–1.5)
LYMPHOCYTES ABSOLUTE: 2.07 E9/L (ref 1.5–4)
LYMPHOCYTES RELATIVE PERCENT: 20.1 % (ref 20–42)
MCH RBC QN AUTO: 30.4 PG (ref 26–35)
MCHC RBC AUTO-ENTMCNC: 31.9 % (ref 32–34.5)
MCV RBC AUTO: 95.2 FL (ref 80–99.9)
METHADONE SCREEN, URINE: NOT DETECTED
MONOCYTES ABSOLUTE: 0.64 E9/L (ref 0.1–0.95)
MONOCYTES RELATIVE PERCENT: 6.2 % (ref 2–12)
NEUTROPHILS ABSOLUTE: 7.26 E9/L (ref 1.8–7.3)
NEUTROPHILS RELATIVE PERCENT: 70.3 % (ref 43–80)
OPIATE SCREEN URINE: NOT DETECTED
PDW BLD-RTO: 12.7 FL (ref 11.5–15)
PHENCYCLIDINE SCREEN URINE: NOT DETECTED
PLATELET # BLD: 356 E9/L (ref 130–450)
PMV BLD AUTO: 9.6 FL (ref 7–12)
POTASSIUM SERPL-SCNC: 3.4 MMOL/L (ref 3.5–5)
PROPOXYPHENE SCREEN: NOT DETECTED
RBC # BLD: 4.61 E12/L (ref 3.5–5.5)
SALICYLATE, SERUM: <0.3 MG/DL (ref 0–30)
SODIUM BLD-SCNC: 141 MMOL/L (ref 132–146)
TOTAL PROTEIN: 7.5 G/DL (ref 6.4–8.3)
TRICYCLIC ANTIDEPRESSANTS SCREEN SERUM: NEGATIVE NG/ML
TROPONIN: <0.01 NG/ML (ref 0–0.03)
WBC # BLD: 10.3 E9/L (ref 4.5–11.5)

## 2018-03-31 PROCEDURE — 80307 DRUG TEST PRSMV CHEM ANLYZR: CPT

## 2018-03-31 PROCEDURE — G0378 HOSPITAL OBSERVATION PER HR: HCPCS

## 2018-03-31 PROCEDURE — 6360000002 HC RX W HCPCS: Performed by: INTERNAL MEDICINE

## 2018-03-31 PROCEDURE — 96372 THER/PROPH/DIAG INJ SC/IM: CPT

## 2018-03-31 PROCEDURE — 73562 X-RAY EXAM OF KNEE 3: CPT

## 2018-03-31 PROCEDURE — 6370000000 HC RX 637 (ALT 250 FOR IP): Performed by: INTERNAL MEDICINE

## 2018-03-31 PROCEDURE — 2580000003 HC RX 258: Performed by: EMERGENCY MEDICINE

## 2018-03-31 PROCEDURE — 36415 COLL VENOUS BLD VENIPUNCTURE: CPT

## 2018-03-31 PROCEDURE — 80053 COMPREHEN METABOLIC PANEL: CPT

## 2018-03-31 PROCEDURE — 84484 ASSAY OF TROPONIN QUANT: CPT

## 2018-03-31 PROCEDURE — 70450 CT HEAD/BRAIN W/O DYE: CPT

## 2018-03-31 PROCEDURE — 99285 EMERGENCY DEPT VISIT HI MDM: CPT

## 2018-03-31 PROCEDURE — 93005 ELECTROCARDIOGRAM TRACING: CPT | Performed by: INTERNAL MEDICINE

## 2018-03-31 PROCEDURE — 85025 COMPLETE CBC W/AUTO DIFF WBC: CPT

## 2018-03-31 PROCEDURE — 80178 ASSAY OF LITHIUM: CPT

## 2018-03-31 PROCEDURE — 2580000003 HC RX 258: Performed by: INTERNAL MEDICINE

## 2018-03-31 RX ORDER — 0.9 % SODIUM CHLORIDE 0.9 %
500 INTRAVENOUS SOLUTION INTRAVENOUS ONCE
Status: COMPLETED | OUTPATIENT
Start: 2018-03-31 | End: 2018-03-31

## 2018-03-31 RX ORDER — TOPIRAMATE 100 MG/1
200 TABLET, FILM COATED ORAL 2 TIMES DAILY
Status: DISCONTINUED | OUTPATIENT
Start: 2018-03-31 | End: 2018-04-03 | Stop reason: HOSPADM

## 2018-03-31 RX ORDER — PANTOPRAZOLE SODIUM 40 MG/1
40 TABLET, DELAYED RELEASE ORAL
Status: DISCONTINUED | OUTPATIENT
Start: 2018-04-01 | End: 2018-04-03 | Stop reason: HOSPADM

## 2018-03-31 RX ORDER — CARVEDILOL 3.12 MG/1
3.12 TABLET ORAL 2 TIMES DAILY
Status: DISCONTINUED | OUTPATIENT
Start: 2018-03-31 | End: 2018-04-03 | Stop reason: HOSPADM

## 2018-03-31 RX ORDER — POTASSIUM CHLORIDE 20 MEQ/1
40 TABLET, EXTENDED RELEASE ORAL ONCE
Status: COMPLETED | OUTPATIENT
Start: 2018-03-31 | End: 2018-03-31

## 2018-03-31 RX ORDER — POTASSIUM CHLORIDE 20 MEQ/1
20 TABLET, EXTENDED RELEASE ORAL DAILY
Status: DISCONTINUED | OUTPATIENT
Start: 2018-03-31 | End: 2018-04-03 | Stop reason: HOSPADM

## 2018-03-31 RX ORDER — SODIUM CHLORIDE 0.9 % (FLUSH) 0.9 %
10 SYRINGE (ML) INJECTION PRN
Status: DISCONTINUED | OUTPATIENT
Start: 2018-03-31 | End: 2018-04-03 | Stop reason: HOSPADM

## 2018-03-31 RX ORDER — LEVOTHYROXINE SODIUM 0.1 MG/1
100 TABLET ORAL DAILY
Status: DISCONTINUED | OUTPATIENT
Start: 2018-04-01 | End: 2018-04-03 | Stop reason: HOSPADM

## 2018-03-31 RX ORDER — ONDANSETRON 2 MG/ML
4 INJECTION INTRAMUSCULAR; INTRAVENOUS EVERY 6 HOURS PRN
Status: DISCONTINUED | OUTPATIENT
Start: 2018-03-31 | End: 2018-04-03 | Stop reason: HOSPADM

## 2018-03-31 RX ORDER — ACETAMINOPHEN 325 MG/1
650 TABLET ORAL EVERY 4 HOURS PRN
Status: DISCONTINUED | OUTPATIENT
Start: 2018-03-31 | End: 2018-04-03 | Stop reason: SDUPTHER

## 2018-03-31 RX ORDER — BENZTROPINE MESYLATE 0.5 MG/1
0.5 TABLET ORAL DAILY
Status: DISCONTINUED | OUTPATIENT
Start: 2018-03-31 | End: 2018-04-02 | Stop reason: SINTOL

## 2018-03-31 RX ORDER — TRAZODONE HYDROCHLORIDE 50 MG/1
50 TABLET ORAL NIGHTLY PRN
Status: DISCONTINUED | OUTPATIENT
Start: 2018-03-31 | End: 2018-04-03 | Stop reason: HOSPADM

## 2018-03-31 RX ORDER — SODIUM CHLORIDE 9 MG/ML
INJECTION, SOLUTION INTRAVENOUS CONTINUOUS
Status: ACTIVE | OUTPATIENT
Start: 2018-03-31 | End: 2018-04-01

## 2018-03-31 RX ORDER — SODIUM CHLORIDE 0.9 % (FLUSH) 0.9 %
10 SYRINGE (ML) INJECTION EVERY 12 HOURS SCHEDULED
Status: DISCONTINUED | OUTPATIENT
Start: 2018-03-31 | End: 2018-04-03 | Stop reason: HOSPADM

## 2018-03-31 RX ORDER — ATORVASTATIN CALCIUM 20 MG/1
20 TABLET, FILM COATED ORAL DAILY
Status: DISCONTINUED | OUTPATIENT
Start: 2018-03-31 | End: 2018-04-03 | Stop reason: HOSPADM

## 2018-03-31 RX ADMIN — SODIUM CHLORIDE 500 ML: 9 INJECTION, SOLUTION INTRAVENOUS at 14:52

## 2018-03-31 RX ADMIN — ENOXAPARIN SODIUM 40 MG: 40 INJECTION SUBCUTANEOUS at 22:51

## 2018-03-31 RX ADMIN — Medication 10 ML: at 22:56

## 2018-03-31 RX ADMIN — CARVEDILOL 3.12 MG: 3.12 TABLET, FILM COATED ORAL at 22:52

## 2018-03-31 RX ADMIN — POTASSIUM CHLORIDE 20 MEQ: 20 TABLET, EXTENDED RELEASE ORAL at 22:58

## 2018-03-31 RX ADMIN — BENZTROPINE MESYLATE 0.5 MG: 0.5 TABLET ORAL at 23:27

## 2018-03-31 RX ADMIN — ATORVASTATIN CALCIUM 20 MG: 20 TABLET, FILM COATED ORAL at 23:27

## 2018-03-31 RX ADMIN — SODIUM CHLORIDE: 9 INJECTION, SOLUTION INTRAVENOUS at 22:56

## 2018-03-31 RX ADMIN — POTASSIUM CHLORIDE 40 MEQ: 20 TABLET, EXTENDED RELEASE ORAL at 22:51

## 2018-03-31 RX ADMIN — Medication 50000 UNITS: at 22:57

## 2018-03-31 ASSESSMENT — PAIN SCALES - GENERAL
PAINLEVEL_OUTOF10: 0

## 2018-04-01 LAB
ALBUMIN SERPL-MCNC: 3.8 G/DL (ref 3.5–5.2)
ALP BLD-CCNC: 120 U/L (ref 35–104)
ALT SERPL-CCNC: 20 U/L (ref 0–32)
ANION GAP SERPL CALCULATED.3IONS-SCNC: 9 MMOL/L (ref 7–16)
AST SERPL-CCNC: 9 U/L (ref 0–31)
BILIRUB SERPL-MCNC: 0.4 MG/DL (ref 0–1.2)
BUN BLDV-MCNC: 8 MG/DL (ref 6–20)
CALCIUM SERPL-MCNC: 9.4 MG/DL (ref 8.6–10.2)
CHLORIDE BLD-SCNC: 108 MMOL/L (ref 98–107)
CO2: 26 MMOL/L (ref 22–29)
CREAT SERPL-MCNC: 0.7 MG/DL (ref 0.5–1)
GFR AFRICAN AMERICAN: >60
GFR NON-AFRICAN AMERICAN: >60 ML/MIN/1.73
GLUCOSE BLD-MCNC: 101 MG/DL (ref 74–109)
LITHIUM DOSE AMOUNT: NORMAL
LITHIUM LEVEL: 1.02 MMOL/L (ref 0.5–1.5)
LV EF: 60 %
LVEF MODALITY: NORMAL
POTASSIUM REFLEX MAGNESIUM: 3.9 MMOL/L (ref 3.5–5)
SODIUM BLD-SCNC: 143 MMOL/L (ref 132–146)
TOTAL PROTEIN: 6.2 G/DL (ref 6.4–8.3)

## 2018-04-01 PROCEDURE — 6360000002 HC RX W HCPCS: Performed by: INTERNAL MEDICINE

## 2018-04-01 PROCEDURE — 99221 1ST HOSP IP/OBS SF/LOW 40: CPT | Performed by: PSYCHIATRY & NEUROLOGY

## 2018-04-01 PROCEDURE — G0378 HOSPITAL OBSERVATION PER HR: HCPCS

## 2018-04-01 PROCEDURE — 36415 COLL VENOUS BLD VENIPUNCTURE: CPT

## 2018-04-01 PROCEDURE — 2580000003 HC RX 258: Performed by: INTERNAL MEDICINE

## 2018-04-01 PROCEDURE — 97165 OT EVAL LOW COMPLEX 30 MIN: CPT

## 2018-04-01 PROCEDURE — 99224 PR SBSQ OBSERVATION CARE/DAY 15 MINUTES: CPT | Performed by: INTERNAL MEDICINE

## 2018-04-01 PROCEDURE — G8988 SELF CARE GOAL STATUS: HCPCS

## 2018-04-01 PROCEDURE — 96372 THER/PROPH/DIAG INJ SC/IM: CPT

## 2018-04-01 PROCEDURE — 80178 ASSAY OF LITHIUM: CPT

## 2018-04-01 PROCEDURE — 80053 COMPREHEN METABOLIC PANEL: CPT

## 2018-04-01 PROCEDURE — 97535 SELF CARE MNGMENT TRAINING: CPT

## 2018-04-01 PROCEDURE — 6370000000 HC RX 637 (ALT 250 FOR IP): Performed by: INTERNAL MEDICINE

## 2018-04-01 PROCEDURE — 93306 TTE W/DOPPLER COMPLETE: CPT

## 2018-04-01 PROCEDURE — 83921 ORGANIC ACID SINGLE QUANT: CPT

## 2018-04-01 PROCEDURE — G8987 SELF CARE CURRENT STATUS: HCPCS

## 2018-04-01 RX ADMIN — ENOXAPARIN SODIUM 40 MG: 40 INJECTION SUBCUTANEOUS at 09:58

## 2018-04-01 RX ADMIN — PANTOPRAZOLE SODIUM 40 MG: 40 TABLET, DELAYED RELEASE ORAL at 06:41

## 2018-04-01 RX ADMIN — TRAZODONE HYDROCHLORIDE 50 MG: 50 TABLET ORAL at 20:39

## 2018-04-01 RX ADMIN — Medication 10 ML: at 09:58

## 2018-04-01 RX ADMIN — CARVEDILOL 3.12 MG: 3.12 TABLET, FILM COATED ORAL at 20:39

## 2018-04-01 RX ADMIN — TOPIRAMATE 200 MG: 100 TABLET, FILM COATED ORAL at 00:10

## 2018-04-01 RX ADMIN — POTASSIUM CHLORIDE 20 MEQ: 20 TABLET, EXTENDED RELEASE ORAL at 09:59

## 2018-04-01 RX ADMIN — BENZTROPINE MESYLATE 0.5 MG: 0.5 TABLET ORAL at 09:59

## 2018-04-01 RX ADMIN — CARVEDILOL 3.12 MG: 3.12 TABLET, FILM COATED ORAL at 09:59

## 2018-04-01 RX ADMIN — VENLAFAXINE HYDROCHLORIDE 225 MG: 150 CAPSULE, EXTENDED RELEASE ORAL at 09:59

## 2018-04-01 RX ADMIN — ATORVASTATIN CALCIUM 20 MG: 20 TABLET, FILM COATED ORAL at 09:59

## 2018-04-01 RX ADMIN — LEVOTHYROXINE SODIUM 100 MCG: 100 TABLET ORAL at 06:41

## 2018-04-01 RX ADMIN — TOPIRAMATE 200 MG: 100 TABLET, FILM COATED ORAL at 20:39

## 2018-04-01 RX ADMIN — TOPIRAMATE 200 MG: 100 TABLET, FILM COATED ORAL at 09:59

## 2018-04-01 RX ADMIN — Medication 10 ML: at 20:39

## 2018-04-01 ASSESSMENT — PAIN SCALES - GENERAL
PAINLEVEL_OUTOF10: 0

## 2018-04-02 LAB
ANION GAP SERPL CALCULATED.3IONS-SCNC: 15 MMOL/L (ref 7–16)
BUN BLDV-MCNC: 7 MG/DL (ref 6–20)
CALCIUM SERPL-MCNC: 9.3 MG/DL (ref 8.6–10.2)
CHLORIDE BLD-SCNC: 107 MMOL/L (ref 98–107)
CO2: 22 MMOL/L (ref 22–29)
CREAT SERPL-MCNC: 0.7 MG/DL (ref 0.5–1)
GFR AFRICAN AMERICAN: >60
GFR NON-AFRICAN AMERICAN: >60 ML/MIN/1.73
GLUCOSE BLD-MCNC: 202 MG/DL (ref 74–109)
LITHIUM DOSE AMOUNT: NORMAL
LITHIUM LEVEL: 0.63 MMOL/L (ref 0.5–1.5)
POTASSIUM SERPL-SCNC: 3.5 MMOL/L (ref 3.5–5)
SODIUM BLD-SCNC: 144 MMOL/L (ref 132–146)

## 2018-04-02 PROCEDURE — G8979 MOBILITY GOAL STATUS: HCPCS

## 2018-04-02 PROCEDURE — 97110 THERAPEUTIC EXERCISES: CPT

## 2018-04-02 PROCEDURE — 36415 COLL VENOUS BLD VENIPUNCTURE: CPT

## 2018-04-02 PROCEDURE — 6370000000 HC RX 637 (ALT 250 FOR IP): Performed by: INTERNAL MEDICINE

## 2018-04-02 PROCEDURE — 97161 PT EVAL LOW COMPLEX 20 MIN: CPT

## 2018-04-02 PROCEDURE — 80178 ASSAY OF LITHIUM: CPT

## 2018-04-02 PROCEDURE — 80048 BASIC METABOLIC PNL TOTAL CA: CPT

## 2018-04-02 PROCEDURE — 2580000003 HC RX 258: Performed by: INTERNAL MEDICINE

## 2018-04-02 PROCEDURE — G0378 HOSPITAL OBSERVATION PER HR: HCPCS

## 2018-04-02 PROCEDURE — 6360000002 HC RX W HCPCS: Performed by: INTERNAL MEDICINE

## 2018-04-02 PROCEDURE — 96372 THER/PROPH/DIAG INJ SC/IM: CPT

## 2018-04-02 PROCEDURE — 99224 PR SBSQ OBSERVATION CARE/DAY 15 MINUTES: CPT | Performed by: INTERNAL MEDICINE

## 2018-04-02 PROCEDURE — G8978 MOBILITY CURRENT STATUS: HCPCS

## 2018-04-02 RX ORDER — LEVOTHYROXINE SODIUM 0.1 MG/1
100 TABLET ORAL DAILY
Status: CANCELLED | OUTPATIENT
Start: 2018-04-03

## 2018-04-02 RX ORDER — POTASSIUM CHLORIDE 20 MEQ/1
20 TABLET, EXTENDED RELEASE ORAL DAILY
Status: CANCELLED | OUTPATIENT
Start: 2018-04-03

## 2018-04-02 RX ORDER — ATORVASTATIN CALCIUM 20 MG/1
20 TABLET, FILM COATED ORAL DAILY
Status: CANCELLED | OUTPATIENT
Start: 2018-04-03

## 2018-04-02 RX ORDER — ERGOCALCIFEROL 1.25 MG/1
50000 CAPSULE ORAL WEEKLY
Status: DISCONTINUED | OUTPATIENT
Start: 2018-04-07 | End: 2018-04-03 | Stop reason: HOSPADM

## 2018-04-02 RX ORDER — ONDANSETRON 2 MG/ML
4 INJECTION INTRAMUSCULAR; INTRAVENOUS EVERY 6 HOURS PRN
Status: CANCELLED | OUTPATIENT
Start: 2018-04-02

## 2018-04-02 RX ORDER — PANTOPRAZOLE SODIUM 40 MG/1
40 TABLET, DELAYED RELEASE ORAL
Status: CANCELLED | OUTPATIENT
Start: 2018-04-03

## 2018-04-02 RX ORDER — ACETAMINOPHEN 325 MG/1
650 TABLET ORAL EVERY 4 HOURS PRN
Status: CANCELLED | OUTPATIENT
Start: 2018-04-02 | Stop reason: SDUPTHER

## 2018-04-02 RX ORDER — TOPIRAMATE 100 MG/1
200 TABLET, FILM COATED ORAL 2 TIMES DAILY
Status: CANCELLED | OUTPATIENT
Start: 2018-04-02

## 2018-04-02 RX ORDER — CARVEDILOL 3.12 MG/1
3.12 TABLET ORAL 2 TIMES DAILY
Status: CANCELLED | OUTPATIENT
Start: 2018-04-02

## 2018-04-02 RX ORDER — SODIUM CHLORIDE 0.9 % (FLUSH) 0.9 %
10 SYRINGE (ML) INJECTION PRN
Status: CANCELLED | OUTPATIENT
Start: 2018-04-02

## 2018-04-02 RX ORDER — SODIUM CHLORIDE 0.9 % (FLUSH) 0.9 %
10 SYRINGE (ML) INJECTION EVERY 12 HOURS SCHEDULED
Status: CANCELLED | OUTPATIENT
Start: 2018-04-02

## 2018-04-02 RX ORDER — TRAZODONE HYDROCHLORIDE 50 MG/1
50 TABLET ORAL NIGHTLY PRN
Status: CANCELLED | OUTPATIENT
Start: 2018-04-02

## 2018-04-02 RX ORDER — ERGOCALCIFEROL 1.25 MG/1
50000 CAPSULE ORAL WEEKLY
Status: CANCELLED | OUTPATIENT
Start: 2018-04-07 | End: 2018-05-26

## 2018-04-02 RX ADMIN — Medication 10 ML: at 20:45

## 2018-04-02 RX ADMIN — ENOXAPARIN SODIUM 40 MG: 40 INJECTION SUBCUTANEOUS at 10:01

## 2018-04-02 RX ADMIN — TOPIRAMATE 200 MG: 100 TABLET, FILM COATED ORAL at 20:45

## 2018-04-02 RX ADMIN — VENLAFAXINE HYDROCHLORIDE 225 MG: 150 CAPSULE, EXTENDED RELEASE ORAL at 09:58

## 2018-04-02 RX ADMIN — PANTOPRAZOLE SODIUM 40 MG: 40 TABLET, DELAYED RELEASE ORAL at 06:24

## 2018-04-02 RX ADMIN — ATORVASTATIN CALCIUM 20 MG: 20 TABLET, FILM COATED ORAL at 09:59

## 2018-04-02 RX ADMIN — CARVEDILOL 3.12 MG: 3.12 TABLET, FILM COATED ORAL at 20:45

## 2018-04-02 RX ADMIN — Medication 10 ML: at 10:02

## 2018-04-02 RX ADMIN — BENZTROPINE MESYLATE 0.5 MG: 0.5 TABLET ORAL at 09:58

## 2018-04-02 RX ADMIN — LEVOTHYROXINE SODIUM 100 MCG: 100 TABLET ORAL at 06:24

## 2018-04-02 RX ADMIN — CARVEDILOL 3.12 MG: 3.12 TABLET, FILM COATED ORAL at 10:01

## 2018-04-02 RX ADMIN — POTASSIUM CHLORIDE 20 MEQ: 20 TABLET, EXTENDED RELEASE ORAL at 08:41

## 2018-04-02 RX ADMIN — TOPIRAMATE 200 MG: 100 TABLET, FILM COATED ORAL at 09:59

## 2018-04-02 ASSESSMENT — PAIN SCALES - GENERAL
PAINLEVEL_OUTOF10: 0

## 2018-04-03 ENCOUNTER — APPOINTMENT (OUTPATIENT)
Dept: MRI IMAGING | Age: 48
DRG: 885 | End: 2018-04-03
Attending: PSYCHIATRY & NEUROLOGY
Payer: COMMERCIAL

## 2018-04-03 ENCOUNTER — HOSPITAL ENCOUNTER (INPATIENT)
Age: 48
LOS: 3 days | Discharge: HOME | DRG: 885 | End: 2018-04-06
Attending: PSYCHIATRY & NEUROLOGY | Admitting: PSYCHIATRY & NEUROLOGY
Payer: COMMERCIAL

## 2018-04-03 VITALS
OXYGEN SATURATION: 97 % | HEIGHT: 63 IN | BODY MASS INDEX: 35.7 KG/M2 | WEIGHT: 201.5 LBS | SYSTOLIC BLOOD PRESSURE: 128 MMHG | DIASTOLIC BLOOD PRESSURE: 82 MMHG | RESPIRATION RATE: 16 BRPM | TEMPERATURE: 97.7 F | HEART RATE: 74 BPM

## 2018-04-03 PROBLEM — F32.A DEPRESSION, ACUTE: Status: ACTIVE | Noted: 2018-04-03

## 2018-04-03 PROCEDURE — G0378 HOSPITAL OBSERVATION PER HR: HCPCS

## 2018-04-03 PROCEDURE — 94664 DEMO&/EVAL PT USE INHALER: CPT

## 2018-04-03 PROCEDURE — 6370000000 HC RX 637 (ALT 250 FOR IP): Performed by: INTERNAL MEDICINE

## 2018-04-03 PROCEDURE — 96372 THER/PROPH/DIAG INJ SC/IM: CPT

## 2018-04-03 PROCEDURE — 1200000000 HC SEMI PRIVATE

## 2018-04-03 PROCEDURE — 2580000003 HC RX 258: Performed by: INTERNAL MEDICINE

## 2018-04-03 PROCEDURE — 6360000002 HC RX W HCPCS: Performed by: INTERNAL MEDICINE

## 2018-04-03 PROCEDURE — 6360000004 HC RX CONTRAST MEDICATION: Performed by: RADIOLOGY

## 2018-04-03 PROCEDURE — 70553 MRI BRAIN STEM W/O & W/DYE: CPT

## 2018-04-03 PROCEDURE — 6370000000 HC RX 637 (ALT 250 FOR IP): Performed by: PSYCHIATRY & NEUROLOGY

## 2018-04-03 PROCEDURE — A9579 GAD-BASE MR CONTRAST NOS,1ML: HCPCS | Performed by: RADIOLOGY

## 2018-04-03 PROCEDURE — 1240000000 HC EMOTIONAL WELLNESS R&B

## 2018-04-03 RX ORDER — POTASSIUM CHLORIDE 20 MEQ/1
20 TABLET, EXTENDED RELEASE ORAL DAILY
Status: DISCONTINUED | OUTPATIENT
Start: 2018-04-03 | End: 2018-04-06 | Stop reason: HOSPADM

## 2018-04-03 RX ORDER — BENZONATATE 100 MG/1
100 CAPSULE ORAL ONCE
Status: COMPLETED | OUTPATIENT
Start: 2018-04-03 | End: 2018-04-03

## 2018-04-03 RX ORDER — ACETAMINOPHEN 325 MG/1
650 TABLET ORAL EVERY 4 HOURS PRN
Status: DISCONTINUED | OUTPATIENT
Start: 2018-04-03 | End: 2018-04-06 | Stop reason: HOSPADM

## 2018-04-03 RX ORDER — BENZONATATE 100 MG/1
100 CAPSULE ORAL 3 TIMES DAILY PRN
Status: DISCONTINUED | OUTPATIENT
Start: 2018-04-03 | End: 2018-04-06 | Stop reason: HOSPADM

## 2018-04-03 RX ORDER — IPRATROPIUM BROMIDE AND ALBUTEROL SULFATE 2.5; .5 MG/3ML; MG/3ML
1 SOLUTION RESPIRATORY (INHALATION) ONCE
Status: COMPLETED | OUTPATIENT
Start: 2018-04-03 | End: 2018-04-03

## 2018-04-03 RX ORDER — HYDROXYZINE PAMOATE 50 MG/1
50 CAPSULE ORAL 3 TIMES DAILY PRN
Status: DISCONTINUED | OUTPATIENT
Start: 2018-04-03 | End: 2018-04-06 | Stop reason: HOSPADM

## 2018-04-03 RX ORDER — MAGNESIUM HYDROXIDE/ALUMINUM HYDROXICE/SIMETHICONE 120; 1200; 1200 MG/30ML; MG/30ML; MG/30ML
30 SUSPENSION ORAL PRN
Status: DISCONTINUED | OUTPATIENT
Start: 2018-04-03 | End: 2018-04-06 | Stop reason: HOSPADM

## 2018-04-03 RX ORDER — TRAZODONE HYDROCHLORIDE 50 MG/1
50 TABLET ORAL NIGHTLY PRN
Status: DISCONTINUED | OUTPATIENT
Start: 2018-04-03 | End: 2018-04-03 | Stop reason: SDUPTHER

## 2018-04-03 RX ORDER — TOPIRAMATE 100 MG/1
200 TABLET, FILM COATED ORAL 2 TIMES DAILY
Status: DISCONTINUED | OUTPATIENT
Start: 2018-04-03 | End: 2018-04-06 | Stop reason: HOSPADM

## 2018-04-03 RX ORDER — LEVOTHYROXINE SODIUM 0.1 MG/1
100 TABLET ORAL DAILY
Status: DISCONTINUED | OUTPATIENT
Start: 2018-04-04 | End: 2018-04-06 | Stop reason: HOSPADM

## 2018-04-03 RX ORDER — PANTOPRAZOLE SODIUM 40 MG/1
40 TABLET, DELAYED RELEASE ORAL
Status: DISCONTINUED | OUTPATIENT
Start: 2018-04-04 | End: 2018-04-06 | Stop reason: HOSPADM

## 2018-04-03 RX ORDER — OLANZAPINE 5 MG/1
5 TABLET ORAL
Status: ACTIVE | OUTPATIENT
Start: 2018-04-03 | End: 2018-04-03

## 2018-04-03 RX ORDER — ACETAMINOPHEN 325 MG/1
650 TABLET ORAL EVERY 4 HOURS PRN
Status: DISCONTINUED | OUTPATIENT
Start: 2018-04-03 | End: 2018-04-03 | Stop reason: HOSPADM

## 2018-04-03 RX ORDER — NICOTINE 21 MG/24HR
1 PATCH, TRANSDERMAL 24 HOURS TRANSDERMAL DAILY
Status: DISCONTINUED | OUTPATIENT
Start: 2018-04-03 | End: 2018-04-06 | Stop reason: HOSPADM

## 2018-04-03 RX ORDER — BENZONATATE 100 MG/1
100 CAPSULE ORAL 3 TIMES DAILY PRN
Status: DISCONTINUED | OUTPATIENT
Start: 2018-04-03 | End: 2018-04-03 | Stop reason: HOSPADM

## 2018-04-03 RX ORDER — BENZTROPINE MESYLATE 1 MG/ML
2 INJECTION INTRAMUSCULAR; INTRAVENOUS 2 TIMES DAILY PRN
Status: DISCONTINUED | OUTPATIENT
Start: 2018-04-03 | End: 2018-04-06 | Stop reason: HOSPADM

## 2018-04-03 RX ORDER — CARVEDILOL 3.12 MG/1
3.12 TABLET ORAL 2 TIMES DAILY
Status: DISCONTINUED | OUTPATIENT
Start: 2018-04-03 | End: 2018-04-06 | Stop reason: HOSPADM

## 2018-04-03 RX ORDER — HALOPERIDOL 5 MG/ML
5 INJECTION INTRAMUSCULAR EVERY 4 HOURS PRN
Status: DISCONTINUED | OUTPATIENT
Start: 2018-04-03 | End: 2018-04-06 | Stop reason: HOSPADM

## 2018-04-03 RX ORDER — ATORVASTATIN CALCIUM 20 MG/1
20 TABLET, FILM COATED ORAL DAILY
Status: DISCONTINUED | OUTPATIENT
Start: 2018-04-03 | End: 2018-04-06 | Stop reason: HOSPADM

## 2018-04-03 RX ORDER — ERGOCALCIFEROL 1.25 MG/1
50000 CAPSULE ORAL WEEKLY
Status: DISCONTINUED | OUTPATIENT
Start: 2018-04-07 | End: 2018-04-06 | Stop reason: HOSPADM

## 2018-04-03 RX ORDER — TRAZODONE HYDROCHLORIDE 50 MG/1
50 TABLET ORAL NIGHTLY PRN
Status: DISCONTINUED | OUTPATIENT
Start: 2018-04-03 | End: 2018-04-06 | Stop reason: HOSPADM

## 2018-04-03 RX ADMIN — HYDROXYZINE PAMOATE 50 MG: 50 CAPSULE ORAL at 20:55

## 2018-04-03 RX ADMIN — ATORVASTATIN CALCIUM 20 MG: 20 TABLET, FILM COATED ORAL at 20:55

## 2018-04-03 RX ADMIN — GADOTERIDOL 19 ML: 279.3 INJECTION, SOLUTION INTRAVENOUS at 20:27

## 2018-04-03 RX ADMIN — TOPIRAMATE 200 MG: 100 TABLET, FILM COATED ORAL at 09:08

## 2018-04-03 RX ADMIN — CARVEDILOL 3.12 MG: 3.12 TABLET, FILM COATED ORAL at 09:08

## 2018-04-03 RX ADMIN — LEVOTHYROXINE SODIUM 100 MCG: 100 TABLET ORAL at 06:41

## 2018-04-03 RX ADMIN — CARVEDILOL 3.12 MG: 3.12 TABLET, FILM COATED ORAL at 20:55

## 2018-04-03 RX ADMIN — TRAZODONE HYDROCHLORIDE 50 MG: 50 TABLET ORAL at 20:55

## 2018-04-03 RX ADMIN — ATORVASTATIN CALCIUM 20 MG: 20 TABLET, FILM COATED ORAL at 09:08

## 2018-04-03 RX ADMIN — VENLAFAXINE HYDROCHLORIDE 225 MG: 150 CAPSULE, EXTENDED RELEASE ORAL at 09:08

## 2018-04-03 RX ADMIN — BENZONATATE 100 MG: 100 CAPSULE ORAL at 12:43

## 2018-04-03 RX ADMIN — PANTOPRAZOLE SODIUM 40 MG: 40 TABLET, DELAYED RELEASE ORAL at 06:41

## 2018-04-03 RX ADMIN — BENZONATATE 100 MG: 100 CAPSULE ORAL at 04:59

## 2018-04-03 RX ADMIN — POTASSIUM CHLORIDE 20 MEQ: 20 TABLET, EXTENDED RELEASE ORAL at 09:08

## 2018-04-03 RX ADMIN — Medication 10 ML: at 09:08

## 2018-04-03 RX ADMIN — ENOXAPARIN SODIUM 40 MG: 40 INJECTION SUBCUTANEOUS at 09:08

## 2018-04-03 RX ADMIN — IPRATROPIUM BROMIDE AND ALBUTEROL SULFATE 1 AMPULE: 2.5; .5 SOLUTION RESPIRATORY (INHALATION) at 05:50

## 2018-04-03 RX ADMIN — TOPIRAMATE 200 MG: 100 TABLET, FILM COATED ORAL at 20:55

## 2018-04-03 ASSESSMENT — SLEEP AND FATIGUE QUESTIONNAIRES
DO YOU HAVE DIFFICULTY SLEEPING: NO
AVERAGE NUMBER OF SLEEP HOURS: 8
DO YOU USE A SLEEP AID: NO
SLEEP PATTERN: NORMAL

## 2018-04-03 ASSESSMENT — LIFESTYLE VARIABLES: HISTORY_ALCOHOL_USE: NO

## 2018-04-03 ASSESSMENT — PATIENT HEALTH QUESTIONNAIRE - PHQ9: SUM OF ALL RESPONSES TO PHQ QUESTIONS 1-9: 10

## 2018-04-03 ASSESSMENT — PAIN SCALES - GENERAL
PAINLEVEL_OUTOF10: 0

## 2018-04-04 PROCEDURE — 1240000000 HC EMOTIONAL WELLNESS R&B

## 2018-04-04 PROCEDURE — 6370000000 HC RX 637 (ALT 250 FOR IP): Performed by: INTERNAL MEDICINE

## 2018-04-04 PROCEDURE — 6370000000 HC RX 637 (ALT 250 FOR IP): Performed by: PSYCHIATRY & NEUROLOGY

## 2018-04-04 PROCEDURE — 99222 1ST HOSP IP/OBS MODERATE 55: CPT | Performed by: PSYCHIATRY & NEUROLOGY

## 2018-04-04 RX ORDER — SODIUM CHLORIDE 0.9 % (FLUSH) 0.9 %
10 SYRINGE (ML) INJECTION PRN
Status: DISCONTINUED | OUTPATIENT
Start: 2018-04-04 | End: 2018-04-06 | Stop reason: HOSPADM

## 2018-04-04 RX ORDER — ONDANSETRON 2 MG/ML
4 INJECTION INTRAMUSCULAR; INTRAVENOUS EVERY 6 HOURS PRN
Status: DISCONTINUED | OUTPATIENT
Start: 2018-04-04 | End: 2018-04-06 | Stop reason: HOSPADM

## 2018-04-04 RX ORDER — SODIUM CHLORIDE 0.9 % (FLUSH) 0.9 %
10 SYRINGE (ML) INJECTION EVERY 12 HOURS SCHEDULED
Status: DISCONTINUED | OUTPATIENT
Start: 2018-04-04 | End: 2018-04-04

## 2018-04-04 RX ADMIN — LEVOTHYROXINE SODIUM 100 MCG: 100 TABLET ORAL at 06:42

## 2018-04-04 RX ADMIN — VENLAFAXINE HYDROCHLORIDE 225 MG: 150 CAPSULE, EXTENDED RELEASE ORAL at 08:47

## 2018-04-04 RX ADMIN — BENZONATATE 100 MG: 100 CAPSULE ORAL at 04:09

## 2018-04-04 RX ADMIN — BENZONATATE 100 MG: 100 CAPSULE ORAL at 00:09

## 2018-04-04 RX ADMIN — ATORVASTATIN CALCIUM 20 MG: 20 TABLET, FILM COATED ORAL at 20:17

## 2018-04-04 RX ADMIN — TOPIRAMATE 200 MG: 100 TABLET, FILM COATED ORAL at 08:47

## 2018-04-04 RX ADMIN — CARVEDILOL 3.12 MG: 3.12 TABLET, FILM COATED ORAL at 20:17

## 2018-04-04 RX ADMIN — CARVEDILOL 3.12 MG: 3.12 TABLET, FILM COATED ORAL at 08:47

## 2018-04-04 RX ADMIN — BENZONATATE 100 MG: 100 CAPSULE ORAL at 08:49

## 2018-04-04 RX ADMIN — HYDROXYZINE PAMOATE 50 MG: 50 CAPSULE ORAL at 08:47

## 2018-04-04 RX ADMIN — PANTOPRAZOLE SODIUM 40 MG: 40 TABLET, DELAYED RELEASE ORAL at 06:42

## 2018-04-04 RX ADMIN — TOPIRAMATE 200 MG: 100 TABLET, FILM COATED ORAL at 20:17

## 2018-04-04 ASSESSMENT — PAIN SCALES - GENERAL
PAINLEVEL_OUTOF10: 0
PAINLEVEL_OUTOF10: 0

## 2018-04-05 LAB — METHYLMALONIC ACID: 0.25 UMOL/L (ref 0–0.4)

## 2018-04-05 PROCEDURE — 1240000000 HC EMOTIONAL WELLNESS R&B

## 2018-04-05 PROCEDURE — 99231 SBSQ HOSP IP/OBS SF/LOW 25: CPT | Performed by: PSYCHIATRY & NEUROLOGY

## 2018-04-05 PROCEDURE — 6370000000 HC RX 637 (ALT 250 FOR IP): Performed by: INTERNAL MEDICINE

## 2018-04-05 PROCEDURE — 6370000000 HC RX 637 (ALT 250 FOR IP): Performed by: PSYCHIATRY & NEUROLOGY

## 2018-04-05 RX ADMIN — TRAZODONE HYDROCHLORIDE 50 MG: 50 TABLET ORAL at 21:07

## 2018-04-05 RX ADMIN — BENZONATATE 100 MG: 100 CAPSULE ORAL at 00:46

## 2018-04-05 RX ADMIN — CARVEDILOL 3.12 MG: 3.12 TABLET, FILM COATED ORAL at 08:54

## 2018-04-05 RX ADMIN — VENLAFAXINE HYDROCHLORIDE 225 MG: 150 CAPSULE, EXTENDED RELEASE ORAL at 08:54

## 2018-04-05 RX ADMIN — TOPIRAMATE 200 MG: 100 TABLET, FILM COATED ORAL at 20:55

## 2018-04-05 RX ADMIN — TOPIRAMATE 200 MG: 100 TABLET, FILM COATED ORAL at 08:54

## 2018-04-05 RX ADMIN — BENZONATATE 100 MG: 100 CAPSULE ORAL at 08:54

## 2018-04-05 RX ADMIN — PANTOPRAZOLE SODIUM 40 MG: 40 TABLET, DELAYED RELEASE ORAL at 06:59

## 2018-04-05 RX ADMIN — HYDROXYZINE PAMOATE 50 MG: 50 CAPSULE ORAL at 08:54

## 2018-04-05 RX ADMIN — CARVEDILOL 3.12 MG: 3.12 TABLET, FILM COATED ORAL at 20:55

## 2018-04-05 RX ADMIN — ATORVASTATIN CALCIUM 20 MG: 20 TABLET, FILM COATED ORAL at 20:55

## 2018-04-05 RX ADMIN — LEVOTHYROXINE SODIUM 100 MCG: 100 TABLET ORAL at 06:59

## 2018-04-06 VITALS
HEART RATE: 60 BPM | SYSTOLIC BLOOD PRESSURE: 111 MMHG | RESPIRATION RATE: 12 BRPM | BODY MASS INDEX: 39.66 KG/M2 | DIASTOLIC BLOOD PRESSURE: 76 MMHG | TEMPERATURE: 98.2 F | HEIGHT: 60 IN | WEIGHT: 202 LBS

## 2018-04-06 LAB
ANION GAP SERPL CALCULATED.3IONS-SCNC: 12 MMOL/L (ref 7–16)
BUN BLDV-MCNC: 11 MG/DL (ref 6–20)
CALCIUM SERPL-MCNC: 9.5 MG/DL (ref 8.6–10.2)
CHLORIDE BLD-SCNC: 104 MMOL/L (ref 98–107)
CO2: 28 MMOL/L (ref 22–29)
CREAT SERPL-MCNC: 0.7 MG/DL (ref 0.5–1)
GFR AFRICAN AMERICAN: >60
GFR NON-AFRICAN AMERICAN: >60 ML/MIN/1.73
GLUCOSE BLD-MCNC: 111 MG/DL (ref 74–109)
POTASSIUM SERPL-SCNC: 4.2 MMOL/L (ref 3.5–5)
SODIUM BLD-SCNC: 144 MMOL/L (ref 132–146)

## 2018-04-06 PROCEDURE — 99238 HOSP IP/OBS DSCHRG MGMT 30/<: CPT | Performed by: PSYCHIATRY & NEUROLOGY

## 2018-04-06 PROCEDURE — 6370000000 HC RX 637 (ALT 250 FOR IP): Performed by: INTERNAL MEDICINE

## 2018-04-06 PROCEDURE — 36415 COLL VENOUS BLD VENIPUNCTURE: CPT

## 2018-04-06 PROCEDURE — 80048 BASIC METABOLIC PNL TOTAL CA: CPT

## 2018-04-06 RX ORDER — NICOTINE 21 MG/24HR
1 PATCH, TRANSDERMAL 24 HOURS TRANSDERMAL DAILY
Qty: 30 PATCH | Refills: 0 | Status: SHIPPED | OUTPATIENT
Start: 2018-04-07 | End: 2018-04-25 | Stop reason: ALTCHOICE

## 2018-04-06 RX ADMIN — BENZONATATE 100 MG: 100 CAPSULE ORAL at 08:10

## 2018-04-06 RX ADMIN — VENLAFAXINE HYDROCHLORIDE 225 MG: 150 CAPSULE, EXTENDED RELEASE ORAL at 08:10

## 2018-04-06 RX ADMIN — TOPIRAMATE 200 MG: 100 TABLET, FILM COATED ORAL at 08:10

## 2018-04-06 RX ADMIN — PANTOPRAZOLE SODIUM 40 MG: 40 TABLET, DELAYED RELEASE ORAL at 06:57

## 2018-04-06 RX ADMIN — CARVEDILOL 3.12 MG: 3.12 TABLET, FILM COATED ORAL at 08:11

## 2018-04-06 RX ADMIN — LEVOTHYROXINE SODIUM 100 MCG: 100 TABLET ORAL at 06:57

## 2018-04-06 ASSESSMENT — PAIN SCALES - GENERAL: PAINLEVEL_OUTOF10: 0

## 2018-04-19 LAB
EKG ATRIAL RATE: 71 BPM
EKG P AXIS: 55 DEGREES
EKG P-R INTERVAL: 180 MS
EKG Q-T INTERVAL: 404 MS
EKG QRS DURATION: 92 MS
EKG QTC CALCULATION (BAZETT): 439 MS
EKG R AXIS: 17 DEGREES
EKG T AXIS: -131 DEGREES
EKG VENTRICULAR RATE: 71 BPM

## 2018-04-19 PROCEDURE — 93010 ELECTROCARDIOGRAM REPORT: CPT | Performed by: INTERNAL MEDICINE

## 2018-04-25 ENCOUNTER — OFFICE VISIT (OUTPATIENT)
Dept: FAMILY MEDICINE CLINIC | Age: 48
End: 2018-04-25
Payer: COMMERCIAL

## 2018-04-25 VITALS
WEIGHT: 199 LBS | BODY MASS INDEX: 36.62 KG/M2 | HEART RATE: 88 BPM | TEMPERATURE: 98.3 F | DIASTOLIC BLOOD PRESSURE: 84 MMHG | OXYGEN SATURATION: 98 % | SYSTOLIC BLOOD PRESSURE: 120 MMHG | HEIGHT: 62 IN

## 2018-04-25 DIAGNOSIS — Z72.0 TOBACCO USE: ICD-10-CM

## 2018-04-25 DIAGNOSIS — F32.A DEPRESSIVE DISORDER: Primary | ICD-10-CM

## 2018-04-25 DIAGNOSIS — E55.9 VITAMIN D DEFICIENCY: ICD-10-CM

## 2018-04-25 DIAGNOSIS — R73.09 ELEVATED GLUCOSE: ICD-10-CM

## 2018-04-25 DIAGNOSIS — M25.522 LEFT ELBOW PAIN: ICD-10-CM

## 2018-04-25 DIAGNOSIS — E78.2 MIXED HYPERLIPIDEMIA: ICD-10-CM

## 2018-04-25 DIAGNOSIS — E03.9 HYPOTHYROIDISM IN ADULT: ICD-10-CM

## 2018-04-25 DIAGNOSIS — I10 ESSENTIAL HYPERTENSION: ICD-10-CM

## 2018-04-25 DIAGNOSIS — G40.909 SEIZURE DISORDER (HCC): ICD-10-CM

## 2018-04-25 PROCEDURE — 4004F PT TOBACCO SCREEN RCVD TLK: CPT | Performed by: FAMILY MEDICINE

## 2018-04-25 PROCEDURE — G8427 DOCREV CUR MEDS BY ELIG CLIN: HCPCS | Performed by: FAMILY MEDICINE

## 2018-04-25 PROCEDURE — 99214 OFFICE O/P EST MOD 30 MIN: CPT | Performed by: FAMILY MEDICINE

## 2018-04-25 PROCEDURE — G8417 CALC BMI ABV UP PARAM F/U: HCPCS | Performed by: FAMILY MEDICINE

## 2018-04-25 PROCEDURE — 1111F DSCHRG MED/CURRENT MED MERGE: CPT | Performed by: FAMILY MEDICINE

## 2018-05-07 DIAGNOSIS — I10 ESSENTIAL HYPERTENSION: Primary | ICD-10-CM

## 2018-05-07 DIAGNOSIS — E55.9 VITAMIN D DEFICIENCY: ICD-10-CM

## 2018-05-07 RX ORDER — CARVEDILOL 3.12 MG/1
3.12 TABLET ORAL 2 TIMES DAILY
Qty: 60 TABLET | Refills: 2 | Status: SHIPPED | OUTPATIENT
Start: 2018-05-07 | End: 2018-05-07 | Stop reason: SDUPTHER

## 2018-06-01 ENCOUNTER — OFFICE VISIT (OUTPATIENT)
Dept: FAMILY MEDICINE CLINIC | Age: 48
End: 2018-06-01
Payer: COMMERCIAL

## 2018-06-01 VITALS
OXYGEN SATURATION: 96 % | SYSTOLIC BLOOD PRESSURE: 120 MMHG | HEART RATE: 92 BPM | HEIGHT: 62 IN | BODY MASS INDEX: 34.96 KG/M2 | DIASTOLIC BLOOD PRESSURE: 74 MMHG | TEMPERATURE: 97.4 F | WEIGHT: 190 LBS

## 2018-06-01 DIAGNOSIS — E03.9 HYPOTHYROIDISM, ADULT: ICD-10-CM

## 2018-06-01 DIAGNOSIS — Z23 NEED FOR 23-POLYVALENT PNEUMOCOCCAL POLYSACCHARIDE VACCINE: ICD-10-CM

## 2018-06-01 DIAGNOSIS — K21.9 GASTROESOPHAGEAL REFLUX DISEASE, ESOPHAGITIS PRESENCE NOT SPECIFIED: ICD-10-CM

## 2018-06-01 DIAGNOSIS — Z72.0 TOBACCO USE: Primary | ICD-10-CM

## 2018-06-01 DIAGNOSIS — R05.3 CHRONIC COUGH: ICD-10-CM

## 2018-06-01 DIAGNOSIS — R06.2 WHEEZING: ICD-10-CM

## 2018-06-01 PROCEDURE — 90732 PPSV23 VACC 2 YRS+ SUBQ/IM: CPT | Performed by: FAMILY MEDICINE

## 2018-06-01 PROCEDURE — 4004F PT TOBACCO SCREEN RCVD TLK: CPT | Performed by: FAMILY MEDICINE

## 2018-06-01 PROCEDURE — 99214 OFFICE O/P EST MOD 30 MIN: CPT | Performed by: FAMILY MEDICINE

## 2018-06-01 PROCEDURE — G8417 CALC BMI ABV UP PARAM F/U: HCPCS | Performed by: FAMILY MEDICINE

## 2018-06-01 PROCEDURE — G8427 DOCREV CUR MEDS BY ELIG CLIN: HCPCS | Performed by: FAMILY MEDICINE

## 2018-06-01 PROCEDURE — G0009 ADMIN PNEUMOCOCCAL VACCINE: HCPCS | Performed by: FAMILY MEDICINE

## 2018-06-01 RX ORDER — TOPIRAMATE 100 MG/1
200 TABLET, FILM COATED ORAL 2 TIMES DAILY
Status: ON HOLD | COMMUNITY
End: 2018-08-07

## 2018-06-01 RX ORDER — OMEPRAZOLE 20 MG/1
20 CAPSULE, DELAYED RELEASE ORAL DAILY
Qty: 90 CAPSULE | Refills: 0 | Status: ON HOLD | OUTPATIENT
Start: 2018-06-01 | End: 2018-08-07

## 2018-06-01 RX ORDER — LEVOTHYROXINE SODIUM 0.1 MG/1
100 TABLET ORAL DAILY
Qty: 30 TABLET | Refills: 3 | Status: ON HOLD | OUTPATIENT
Start: 2018-06-01 | End: 2018-08-07

## 2018-06-01 RX ORDER — ALBUTEROL SULFATE 90 UG/1
2 AEROSOL, METERED RESPIRATORY (INHALATION) EVERY 6 HOURS PRN
Qty: 1 INHALER | Refills: 3 | Status: SHIPPED | OUTPATIENT
Start: 2018-06-01 | End: 2018-08-03

## 2018-06-12 ENCOUNTER — HOSPITAL ENCOUNTER (OUTPATIENT)
Dept: GENERAL RADIOLOGY | Age: 48
Discharge: HOME | End: 2018-06-14
Payer: COMMERCIAL

## 2018-06-12 ENCOUNTER — HOSPITAL ENCOUNTER (OUTPATIENT)
Age: 48
Discharge: HOME | End: 2018-06-14
Payer: COMMERCIAL

## 2018-06-12 DIAGNOSIS — R05.3 CHRONIC COUGH: ICD-10-CM

## 2018-06-12 PROCEDURE — 71046 X-RAY EXAM CHEST 2 VIEWS: CPT

## 2018-07-25 DIAGNOSIS — E55.9 VITAMIN D DEFICIENCY: ICD-10-CM

## 2018-07-25 RX ORDER — CHOLECALCIFEROL (VITAMIN D3) 50 MCG
2000 TABLET ORAL DAILY
Qty: 30 TABLET | Refills: 5 | Status: ON HOLD | OUTPATIENT
Start: 2018-07-25 | End: 2018-08-07

## 2018-07-27 ENCOUNTER — TELEPHONE (OUTPATIENT)
Dept: FAMILY MEDICINE CLINIC | Age: 48
End: 2018-07-27

## 2018-07-27 NOTE — TELEPHONE ENCOUNTER
Pt called to ask if she is to be on step 3 of the nicotine patch for 1 or 2 months? She was sent more by the pharmacy.

## 2018-08-02 ENCOUNTER — HOSPITAL ENCOUNTER (INPATIENT)
Age: 48
LOS: 4 days | Discharge: ANOTHER HOSPITAL | DRG: 885 | End: 2018-08-07
Attending: PSYCHIATRY & NEUROLOGY | Admitting: PSYCHIATRY & NEUROLOGY
Payer: COMMERCIAL

## 2018-08-02 DIAGNOSIS — E03.9 HYPOTHYROIDISM, ADULT: ICD-10-CM

## 2018-08-02 DIAGNOSIS — K21.9 GASTROESOPHAGEAL REFLUX DISEASE, ESOPHAGITIS PRESENCE NOT SPECIFIED: ICD-10-CM

## 2018-08-02 DIAGNOSIS — R45.851 SUICIDAL IDEATION: ICD-10-CM

## 2018-08-02 DIAGNOSIS — F20.9 SCHIZOPHRENIA, UNSPECIFIED TYPE (HCC): Primary | ICD-10-CM

## 2018-08-02 DIAGNOSIS — I10 ESSENTIAL HYPERTENSION: ICD-10-CM

## 2018-08-02 DIAGNOSIS — E78.2 MIXED HYPERLIPIDEMIA: ICD-10-CM

## 2018-08-02 DIAGNOSIS — E55.9 VITAMIN D DEFICIENCY: ICD-10-CM

## 2018-08-02 LAB
BASOPHILS ABSOLUTE: 0.05 E9/L (ref 0–0.2)
BASOPHILS RELATIVE PERCENT: 0.6 % (ref 0–2)
BILIRUBIN URINE: NEGATIVE
BLOOD, URINE: NEGATIVE
CHP ED QC CHECK: NORMAL
CLARITY: CLEAR
COLOR: YELLOW
EOSINOPHILS ABSOLUTE: 0.08 E9/L (ref 0.05–0.5)
EOSINOPHILS RELATIVE PERCENT: 0.9 % (ref 0–6)
GLUCOSE URINE: NEGATIVE MG/DL
HCT VFR BLD CALC: 40.2 % (ref 34–48)
HEMOGLOBIN: 13.1 G/DL (ref 11.5–15.5)
IMMATURE GRANULOCYTES #: 0.03 E9/L
IMMATURE GRANULOCYTES %: 0.3 % (ref 0–5)
KETONES, URINE: NEGATIVE MG/DL
LEUKOCYTE ESTERASE, URINE: ABNORMAL
LYMPHOCYTES ABSOLUTE: 2.11 E9/L (ref 1.5–4)
LYMPHOCYTES RELATIVE PERCENT: 23.8 % (ref 20–42)
MCH RBC QN AUTO: 27.9 PG (ref 26–35)
MCHC RBC AUTO-ENTMCNC: 32.6 % (ref 32–34.5)
MCV RBC AUTO: 85.7 FL (ref 80–99.9)
MONOCYTES ABSOLUTE: 0.65 E9/L (ref 0.1–0.95)
MONOCYTES RELATIVE PERCENT: 7.3 % (ref 2–12)
NEUTROPHILS ABSOLUTE: 5.93 E9/L (ref 1.8–7.3)
NEUTROPHILS RELATIVE PERCENT: 67.1 % (ref 43–80)
NITRITE, URINE: NEGATIVE
PDW BLD-RTO: 14.1 FL (ref 11.5–15)
PH UA: 6 (ref 5–9)
PLATELET # BLD: 290 E9/L (ref 130–450)
PMV BLD AUTO: 9.8 FL (ref 7–12)
PREGNANCY TEST URINE, POC: NORMAL
PROTEIN UA: NEGATIVE MG/DL
RBC # BLD: 4.69 E12/L (ref 3.5–5.5)
SPECIFIC GRAVITY UA: <=1.005 (ref 1–1.03)
UROBILINOGEN, URINE: 0.2 E.U./DL
WBC # BLD: 8.9 E9/L (ref 4.5–11.5)

## 2018-08-02 PROCEDURE — 99285 EMERGENCY DEPT VISIT HI MDM: CPT

## 2018-08-02 PROCEDURE — 36415 COLL VENOUS BLD VENIPUNCTURE: CPT

## 2018-08-02 PROCEDURE — 85025 COMPLETE CBC W/AUTO DIFF WBC: CPT

## 2018-08-02 PROCEDURE — G0480 DRUG TEST DEF 1-7 CLASSES: HCPCS

## 2018-08-02 PROCEDURE — 80307 DRUG TEST PRSMV CHEM ANLYZR: CPT

## 2018-08-02 PROCEDURE — 84443 ASSAY THYROID STIM HORMONE: CPT

## 2018-08-02 PROCEDURE — 84436 ASSAY OF TOTAL THYROXINE: CPT

## 2018-08-02 PROCEDURE — 81001 URINALYSIS AUTO W/SCOPE: CPT

## 2018-08-02 PROCEDURE — 80053 COMPREHEN METABOLIC PANEL: CPT

## 2018-08-03 PROBLEM — F32.A DEPRESSION WITH SUICIDAL IDEATION: Status: ACTIVE | Noted: 2018-08-03

## 2018-08-03 PROBLEM — R45.851 DEPRESSION WITH SUICIDAL IDEATION: Status: ACTIVE | Noted: 2018-08-03

## 2018-08-03 LAB
ACETAMINOPHEN LEVEL: <5 MCG/ML (ref 10–30)
ALBUMIN SERPL-MCNC: 4.4 G/DL (ref 3.5–5.2)
ALP BLD-CCNC: 97 U/L (ref 35–104)
ALT SERPL-CCNC: 15 U/L (ref 0–32)
AMPHETAMINE SCREEN, URINE: NOT DETECTED
ANION GAP SERPL CALCULATED.3IONS-SCNC: 15 MMOL/L (ref 7–16)
AST SERPL-CCNC: 12 U/L (ref 0–31)
BACTERIA: ABNORMAL /HPF
BARBITURATE SCREEN URINE: NOT DETECTED
BENZODIAZEPINE SCREEN, URINE: NOT DETECTED
BILIRUB SERPL-MCNC: 0.3 MG/DL (ref 0–1.2)
BUN BLDV-MCNC: 9 MG/DL (ref 6–20)
CALCIUM SERPL-MCNC: 9.5 MG/DL (ref 8.6–10.2)
CANNABINOID SCREEN URINE: NOT DETECTED
CHLORIDE BLD-SCNC: 103 MMOL/L (ref 98–107)
CO2: 20 MMOL/L (ref 22–29)
COCAINE METABOLITE SCREEN URINE: NOT DETECTED
CREAT SERPL-MCNC: 0.7 MG/DL (ref 0.5–1)
EPITHELIAL CELLS, UA: ABNORMAL /HPF
ETHANOL: <10 MG/DL (ref 0–0.08)
GFR AFRICAN AMERICAN: >60
GFR NON-AFRICAN AMERICAN: >60 ML/MIN/1.73
GLUCOSE BLD-MCNC: 110 MG/DL (ref 74–109)
METHADONE SCREEN, URINE: NOT DETECTED
OPIATE SCREEN URINE: NOT DETECTED
PHENCYCLIDINE SCREEN URINE: NOT DETECTED
POTASSIUM SERPL-SCNC: 3.9 MMOL/L (ref 3.5–5)
PROPOXYPHENE SCREEN: NOT DETECTED
RBC UA: ABNORMAL /HPF (ref 0–2)
SALICYLATE, SERUM: <0.3 MG/DL (ref 0–30)
SODIUM BLD-SCNC: 138 MMOL/L (ref 132–146)
T4 TOTAL: 8.3 MCG/DL (ref 4.5–11.7)
TOTAL PROTEIN: 7.3 G/DL (ref 6.4–8.3)
TRICYCLIC ANTIDEPRESSANTS SCREEN SERUM: NEGATIVE NG/ML
TSH SERPL DL<=0.05 MIU/L-ACNC: 0.69 UIU/ML (ref 0.27–4.2)
WBC UA: ABNORMAL /HPF (ref 0–5)

## 2018-08-03 PROCEDURE — 6360000002 HC RX W HCPCS: Performed by: EMERGENCY MEDICINE

## 2018-08-03 PROCEDURE — 1240000000 HC EMOTIONAL WELLNESS R&B

## 2018-08-03 PROCEDURE — 96374 THER/PROPH/DIAG INJ IV PUSH: CPT

## 2018-08-03 PROCEDURE — 6370000000 HC RX 637 (ALT 250 FOR IP): Performed by: NURSE PRACTITIONER

## 2018-08-03 RX ORDER — DIPHENHYDRAMINE HYDROCHLORIDE 50 MG/ML
50 INJECTION INTRAMUSCULAR; INTRAVENOUS ONCE
Status: COMPLETED | OUTPATIENT
Start: 2018-08-03 | End: 2018-08-03

## 2018-08-03 RX ORDER — TRAZODONE HYDROCHLORIDE 50 MG/1
50 TABLET ORAL NIGHTLY PRN
Status: DISCONTINUED | OUTPATIENT
Start: 2018-08-03 | End: 2018-08-07 | Stop reason: HOSPADM

## 2018-08-03 RX ORDER — MAGNESIUM HYDROXIDE/ALUMINUM HYDROXICE/SIMETHICONE 120; 1200; 1200 MG/30ML; MG/30ML; MG/30ML
30 SUSPENSION ORAL PRN
Status: DISCONTINUED | OUTPATIENT
Start: 2018-08-03 | End: 2018-08-07 | Stop reason: HOSPADM

## 2018-08-03 RX ORDER — HALOPERIDOL 5 MG/ML
10 INJECTION INTRAMUSCULAR EVERY 6 HOURS PRN
Status: DISCONTINUED | OUTPATIENT
Start: 2018-08-03 | End: 2018-08-07 | Stop reason: HOSPADM

## 2018-08-03 RX ORDER — OLANZAPINE 10 MG/1
10 TABLET ORAL
Status: ACTIVE | OUTPATIENT
Start: 2018-08-03 | End: 2018-08-03

## 2018-08-03 RX ORDER — ACETAMINOPHEN 325 MG/1
650 TABLET ORAL EVERY 4 HOURS PRN
Status: DISCONTINUED | OUTPATIENT
Start: 2018-08-03 | End: 2018-08-07 | Stop reason: HOSPADM

## 2018-08-03 RX ORDER — BENZTROPINE MESYLATE 1 MG/ML
2 INJECTION INTRAMUSCULAR; INTRAVENOUS 2 TIMES DAILY PRN
Status: DISCONTINUED | OUTPATIENT
Start: 2018-08-03 | End: 2018-08-07 | Stop reason: HOSPADM

## 2018-08-03 RX ORDER — HYDROXYZINE PAMOATE 50 MG/1
50 CAPSULE ORAL EVERY 6 HOURS PRN
Status: DISCONTINUED | OUTPATIENT
Start: 2018-08-03 | End: 2018-08-07 | Stop reason: HOSPADM

## 2018-08-03 RX ADMIN — TRAZODONE HYDROCHLORIDE 50 MG: 50 TABLET ORAL at 21:05

## 2018-08-03 RX ADMIN — DIPHENHYDRAMINE HYDROCHLORIDE 50 MG: 50 INJECTION, SOLUTION INTRAMUSCULAR; INTRAVENOUS at 11:29

## 2018-08-03 RX ADMIN — HYDROXYZINE PAMOATE 50 MG: 50 CAPSULE ORAL at 21:06

## 2018-08-03 ASSESSMENT — SLEEP AND FATIGUE QUESTIONNAIRES
AVERAGE NUMBER OF SLEEP HOURS: 8
DO YOU USE A SLEEP AID: YES
DIFFICULTY ARISING: NO
SLEEP PATTERN: NORMAL
RESTFUL SLEEP: YES
DIFFICULTY STAYING ASLEEP: NO
DIFFICULTY FALLING ASLEEP: NO
DO YOU HAVE DIFFICULTY SLEEPING: NO

## 2018-08-03 ASSESSMENT — LIFESTYLE VARIABLES: HISTORY_ALCOHOL_USE: NO
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
## 2018-08-03 NOTE — ED NOTES
Pt is alert and oriented x4, cooperative. When asked about SI pt pulled a blanket over her head and wouldn't speak. No complaints at this time. Will continue to monitor.       Tal Anthony RN  08/03/18 4697

## 2018-08-03 NOTE — ED NOTES
Pt became upset when she found out she is to be admitted and tried to elope. Staff redirected pt and pt was medicated per order. Pt is now apologetic. Pt asked this RN to take her pillowcase stating \"I want to put it around my neck so bad\". Pillowcase was taken away per pt's request. Will continue to monitor.       Jia Feliz, FÉLIX  08/03/18 5993

## 2018-08-03 NOTE — ED PROVIDER NOTES
reports that she does not drink alcohol or use drugs. Family History: family history includes High Blood Pressure in her mother; No Known Problems in her father. Allergies: Adhesive tape    Physical Exam           ED Triage Vitals [08/02/18 2246]   BP Temp Temp Source Pulse Resp SpO2 Height Weight   134/88 97.2 °F (36.2 °C) Temporal 89 18 95 % 5' 2.5\" (1.588 m) 190 lb (86.2 kg)      Oxygen Saturation Interpretation: Normal.    General Appearance:  well-appearing, hospital attire and seated in bed. Constitutional:   Level of Consciousness: Awake and alert. ETOH: No.          Distress: none. Cooperativeness: cooperative. Eyes:  PERRL, EOMI, no discharge or conjunctival injection. Ears:  External ears without lesions. Throat:  Pharynx without injection, exudate, or tonsillar hypertrophy. Airway patient. Neck:  Normal ROM. Supple. Respiratory:  Clear to auscultation and breath sounds equal.  CV:  Regular rate and rhythm, normal heart sounds, without pathological murmurs, ectopy, gallops, or rubs. GI:  Abdomen Soft, nontender, good bowel sounds. No firm or pulsatile mass. Back:  No costovertebral tenderness. Integument:  Normal turgor. Warm, dry, without visible rash, unless noted elsewhere. Lymphatics: No lymphangitis or adenopathy noted. Neurological:  Oriented. Motor functions intact. Psychiatric:        Thought Process:       Coherent:  Yes. Delusions / Paranoia: paranoid. Flight of ideas:  No.         Rambling conversation:  No.       Affect: fearful. Suicidal ideation:  suicidal ideation with clear plan and intent. Homicidal ideation:  no homicidal ideation. Perceptions:  denies any perceptual disturbance present. Insight: below average. Judgement: below average.     Lab / Imaging Results   (All laboratory and radiology results have been personally reviewed by myself)  Labs:  Results for orders placed or performed during the Negative Negative mg/dL    Urobilinogen, Urine 0.2 <2.0 E.U./dL    Nitrite, Urine Negative Negative    Leukocyte Esterase, Urine SMALL (A) Negative   TSH without Reflex   Result Value Ref Range    TSH 0.687 0.270 - 4.200 uIU/mL   T4   Result Value Ref Range    T4, Total 8.3 4.5 - 11.7 mcg/dL   Urine Drug Screen   Result Value Ref Range    Amphetamine Screen, Urine NOT DETECTED Negative <1000 ng/mL    Barbiturate Screen, Ur NOT DETECTED Negative < 200 ng/mL    Benzodiazepine Screen, Urine NOT DETECTED Negative < 200 ng/mL    Cannabinoid Scrn, Ur NOT DETECTED Negative < 50ng/mL    Cocaine Metabolite Screen, Urine NOT DETECTED Negative < 300 ng/mL    Opiate Scrn, Ur NOT DETECTED Negative < 300ng/mL    PCP Screen, Urine NOT DETECTED Negative < 25 ng/mL    Methadone Screen, Urine NOT DETECTED Negative <300 ng/mL    Propoxyphene Scrn, Ur NOT DETECTED Negative <300 ng/mL   Microscopic Urinalysis   Result Value Ref Range    WBC, UA 1-3 0 - 5 /HPF    RBC, UA 0-1 0 - 2 /HPF    Epi Cells MANY /HPF    Bacteria, UA MODERATE (A) /HPF   POC Pregnancy Urine Qual   Result Value Ref Range    Preg Test, Ur negative. QC OK? yes. Imaging: All Radiology results interpreted by Radiologist unless otherwise noted. No orders to display       ED Course / Medical Decision Making   Medications - No data to display     Re-examination:  8/2/18       Time: 0020   Patient resting no distress. No harm to self or others. Calm and cooperative. Consult(s):   IP CONSULT TO SOCIAL WORK    Procedure(s):   none    MDM:   Patient presented with known psychiatric history and suicide attempt at her group home today. Patient is medically cleared and awaiting disposition per social work. She remains calm and cooperative with no harm to self or others. Counseling:     The emergency provider has spoken with the patient and discussed todays results, in addition to providing specific details for the plan of care and counseling regarding the

## 2018-08-04 LAB
CHOLESTEROL, TOTAL: 222 MG/DL (ref 0–199)
HBA1C MFR BLD: 5.4 % (ref 4–5.6)
HDLC SERPL-MCNC: 42 MG/DL
LDL CHOLESTEROL CALCULATED: 135 MG/DL (ref 0–99)
TRIGL SERPL-MCNC: 224 MG/DL (ref 0–149)
VLDLC SERPL CALC-MCNC: 45 MG/DL

## 2018-08-04 PROCEDURE — 83036 HEMOGLOBIN GLYCOSYLATED A1C: CPT

## 2018-08-04 PROCEDURE — 6370000000 HC RX 637 (ALT 250 FOR IP): Performed by: NURSE PRACTITIONER

## 2018-08-04 PROCEDURE — 36415 COLL VENOUS BLD VENIPUNCTURE: CPT

## 2018-08-04 PROCEDURE — 99221 1ST HOSP IP/OBS SF/LOW 40: CPT | Performed by: PSYCHIATRY & NEUROLOGY

## 2018-08-04 PROCEDURE — 80061 LIPID PANEL: CPT

## 2018-08-04 PROCEDURE — 1240000000 HC EMOTIONAL WELLNESS R&B

## 2018-08-04 RX ORDER — LEVOTHYROXINE SODIUM 0.1 MG/1
100 TABLET ORAL DAILY
Status: DISCONTINUED | OUTPATIENT
Start: 2018-08-04 | End: 2018-08-07 | Stop reason: HOSPADM

## 2018-08-04 RX ORDER — CHOLECALCIFEROL (VITAMIN D3) 50 MCG
2000 TABLET ORAL DAILY
Status: DISCONTINUED | OUTPATIENT
Start: 2018-08-04 | End: 2018-08-07 | Stop reason: HOSPADM

## 2018-08-04 RX ORDER — HYDROXYZINE HYDROCHLORIDE 10 MG/1
50 TABLET, FILM COATED ORAL EVERY 8 HOURS PRN
Status: DISCONTINUED | OUTPATIENT
Start: 2018-08-04 | End: 2018-08-07 | Stop reason: HOSPADM

## 2018-08-04 RX ORDER — CARVEDILOL 3.12 MG/1
3.12 TABLET ORAL 2 TIMES DAILY
Status: DISCONTINUED | OUTPATIENT
Start: 2018-08-04 | End: 2018-08-07 | Stop reason: HOSPADM

## 2018-08-04 RX ORDER — TOPIRAMATE 100 MG/1
200 TABLET, FILM COATED ORAL 2 TIMES DAILY
Status: DISCONTINUED | OUTPATIENT
Start: 2018-08-04 | End: 2018-08-07 | Stop reason: HOSPADM

## 2018-08-04 RX ORDER — BENZTROPINE MESYLATE 1 MG/1
0.5 TABLET ORAL DAILY
Status: DISCONTINUED | OUTPATIENT
Start: 2018-08-04 | End: 2018-08-07 | Stop reason: HOSPADM

## 2018-08-04 RX ORDER — PANTOPRAZOLE SODIUM 40 MG/1
40 TABLET, DELAYED RELEASE ORAL
Status: DISCONTINUED | OUTPATIENT
Start: 2018-08-05 | End: 2018-08-07 | Stop reason: HOSPADM

## 2018-08-04 RX ORDER — ATORVASTATIN CALCIUM 20 MG/1
20 TABLET, FILM COATED ORAL DAILY
Status: DISCONTINUED | OUTPATIENT
Start: 2018-08-04 | End: 2018-08-07 | Stop reason: HOSPADM

## 2018-08-04 RX ORDER — VENLAFAXINE HYDROCHLORIDE 75 MG/1
225 CAPSULE, EXTENDED RELEASE ORAL
Status: DISCONTINUED | OUTPATIENT
Start: 2018-08-04 | End: 2018-08-06

## 2018-08-04 RX ADMIN — TOPIRAMATE 200 MG: 100 TABLET, FILM COATED ORAL at 20:27

## 2018-08-04 RX ADMIN — HYDROXYZINE PAMOATE 50 MG: 50 CAPSULE ORAL at 10:26

## 2018-08-04 RX ADMIN — TOPIRAMATE 200 MG: 100 TABLET, FILM COATED ORAL at 10:26

## 2018-08-04 RX ADMIN — Medication 2000 UNITS: at 10:26

## 2018-08-04 RX ADMIN — ATORVASTATIN CALCIUM 20 MG: 20 TABLET, FILM COATED ORAL at 10:28

## 2018-08-04 RX ADMIN — ACETAMINOPHEN 650 MG: 325 TABLET, FILM COATED ORAL at 20:27

## 2018-08-04 RX ADMIN — BENZTROPINE MESYLATE 0.5 MG: 1 TABLET ORAL at 10:28

## 2018-08-04 RX ADMIN — VENLAFAXINE HYDROCHLORIDE 225 MG: 75 CAPSULE, EXTENDED RELEASE ORAL at 10:26

## 2018-08-04 RX ADMIN — CARVEDILOL 3.12 MG: 3.12 TABLET, FILM COATED ORAL at 20:28

## 2018-08-04 RX ADMIN — LEVOTHYROXINE SODIUM 100 MCG: 100 TABLET ORAL at 10:26

## 2018-08-04 RX ADMIN — ACETAMINOPHEN 650 MG: 325 TABLET, FILM COATED ORAL at 02:42

## 2018-08-04 ASSESSMENT — SLEEP AND FATIGUE QUESTIONNAIRES
SLEEP PATTERN: NORMAL
DIFFICULTY STAYING ASLEEP: NO
DIFFICULTY FALLING ASLEEP: NO
DO YOU USE A SLEEP AID: YES
DIFFICULTY ARISING: NO
DO YOU HAVE DIFFICULTY SLEEPING: NO
RESTFUL SLEEP: YES
AVERAGE NUMBER OF SLEEP HOURS: 8

## 2018-08-04 ASSESSMENT — PAIN - FUNCTIONAL ASSESSMENT: PAIN_FUNCTIONAL_ASSESSMENT: 0-10

## 2018-08-04 ASSESSMENT — PAIN SCALES - GENERAL
PAINLEVEL_OUTOF10: 0
PAINLEVEL_OUTOF10: 0
PAINLEVEL_OUTOF10: 8
PAINLEVEL_OUTOF10: 5

## 2018-08-04 ASSESSMENT — PATIENT HEALTH QUESTIONNAIRE - PHQ9: SUM OF ALL RESPONSES TO PHQ QUESTIONS 1-9: 14

## 2018-08-04 ASSESSMENT — LIFESTYLE VARIABLES: HISTORY_ALCOHOL_USE: NO
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
## 2018-08-04 NOTE — PLAN OF CARE
Problem: Depressive Behavior With or Without Suicide Precautions:  Goal: Able to verbalize acceptance of life and situations over which he or she has no control  Able to verbalize acceptance of life and situations over which he or she has no control   Outcome: Ongoing    Goal: Able to verbalize and/or display a decrease in depressive symptoms  Able to verbalize and/or display a decrease in depressive symptoms   Outcome: Ongoing
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
## 2018-08-05 PROCEDURE — 6370000000 HC RX 637 (ALT 250 FOR IP): Performed by: NURSE PRACTITIONER

## 2018-08-05 PROCEDURE — 1240000000 HC EMOTIONAL WELLNESS R&B

## 2018-08-05 PROCEDURE — 99231 SBSQ HOSP IP/OBS SF/LOW 25: CPT | Performed by: PSYCHIATRY & NEUROLOGY

## 2018-08-05 RX ORDER — PETROLATUM 42 G/100G
OINTMENT TOPICAL 2 TIMES DAILY PRN
Status: DISCONTINUED | OUTPATIENT
Start: 2018-08-05 | End: 2018-08-07 | Stop reason: HOSPADM

## 2018-08-05 RX ADMIN — Medication 2000 UNITS: at 09:12

## 2018-08-05 RX ADMIN — VENLAFAXINE HYDROCHLORIDE 225 MG: 75 CAPSULE, EXTENDED RELEASE ORAL at 09:11

## 2018-08-05 RX ADMIN — BENZTROPINE MESYLATE 0.5 MG: 1 TABLET ORAL at 09:12

## 2018-08-05 RX ADMIN — ATORVASTATIN CALCIUM 20 MG: 20 TABLET, FILM COATED ORAL at 09:12

## 2018-08-05 RX ADMIN — TOPIRAMATE 200 MG: 100 TABLET, FILM COATED ORAL at 09:16

## 2018-08-05 RX ADMIN — TOPIRAMATE 200 MG: 100 TABLET, FILM COATED ORAL at 21:28

## 2018-08-05 RX ADMIN — PANTOPRAZOLE SODIUM 40 MG: 40 TABLET, DELAYED RELEASE ORAL at 06:21

## 2018-08-05 RX ADMIN — LEVOTHYROXINE SODIUM 100 MCG: 100 TABLET ORAL at 06:21

## 2018-08-05 ASSESSMENT — PAIN SCALES - GENERAL
PAINLEVEL_OUTOF10: 0
PAINLEVEL_OUTOF10: 0

## 2018-08-06 PROCEDURE — 1240000000 HC EMOTIONAL WELLNESS R&B

## 2018-08-06 PROCEDURE — 99232 SBSQ HOSP IP/OBS MODERATE 35: CPT | Performed by: PSYCHIATRY & NEUROLOGY

## 2018-08-06 PROCEDURE — 6370000000 HC RX 637 (ALT 250 FOR IP): Performed by: NURSE PRACTITIONER

## 2018-08-06 RX ORDER — VENLAFAXINE HYDROCHLORIDE 150 MG/1
150 CAPSULE, EXTENDED RELEASE ORAL
Status: DISCONTINUED | OUTPATIENT
Start: 2018-08-07 | End: 2018-08-07 | Stop reason: HOSPADM

## 2018-08-06 RX ADMIN — CARVEDILOL 3.12 MG: 3.12 TABLET, FILM COATED ORAL at 21:06

## 2018-08-06 RX ADMIN — LEVOTHYROXINE SODIUM 100 MCG: 100 TABLET ORAL at 06:15

## 2018-08-06 RX ADMIN — Medication 2000 UNITS: at 08:41

## 2018-08-06 RX ADMIN — VENLAFAXINE HYDROCHLORIDE 225 MG: 75 CAPSULE, EXTENDED RELEASE ORAL at 08:41

## 2018-08-06 RX ADMIN — PANTOPRAZOLE SODIUM 40 MG: 40 TABLET, DELAYED RELEASE ORAL at 06:15

## 2018-08-06 RX ADMIN — TOPIRAMATE 200 MG: 100 TABLET, FILM COATED ORAL at 21:06

## 2018-08-06 RX ADMIN — TOPIRAMATE 200 MG: 100 TABLET, FILM COATED ORAL at 08:41

## 2018-08-06 RX ADMIN — BENZTROPINE MESYLATE 0.5 MG: 1 TABLET ORAL at 08:41

## 2018-08-06 RX ADMIN — CARVEDILOL 3.12 MG: 3.12 TABLET, FILM COATED ORAL at 08:41

## 2018-08-06 RX ADMIN — ATORVASTATIN CALCIUM 20 MG: 20 TABLET, FILM COATED ORAL at 08:41

## 2018-08-06 ASSESSMENT — PAIN SCALES - GENERAL: PAINLEVEL_OUTOF10: 0
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
## 2018-08-07 VITALS
HEIGHT: 63 IN | WEIGHT: 190 LBS | DIASTOLIC BLOOD PRESSURE: 65 MMHG | BODY MASS INDEX: 33.66 KG/M2 | RESPIRATION RATE: 16 BRPM | OXYGEN SATURATION: 93 % | SYSTOLIC BLOOD PRESSURE: 97 MMHG | TEMPERATURE: 98.5 F | HEART RATE: 63 BPM

## 2018-08-07 PROCEDURE — 99238 HOSP IP/OBS DSCHRG MGMT 30/<: CPT | Performed by: PSYCHIATRY & NEUROLOGY

## 2018-08-07 PROCEDURE — 6370000000 HC RX 637 (ALT 250 FOR IP): Performed by: NURSE PRACTITIONER

## 2018-08-07 RX ORDER — CHOLECALCIFEROL (VITAMIN D3) 50 MCG
2000 TABLET ORAL DAILY
Qty: 30 TABLET | Refills: 0 | Status: SHIPPED | OUTPATIENT
Start: 2018-08-07 | End: 2019-01-24 | Stop reason: SDUPTHER

## 2018-08-07 RX ORDER — LEVOTHYROXINE SODIUM 0.1 MG/1
100 TABLET ORAL DAILY
Qty: 30 TABLET | Refills: 0 | Status: SHIPPED | OUTPATIENT
Start: 2018-08-07 | End: 2018-10-04 | Stop reason: SDUPTHER

## 2018-08-07 RX ORDER — TOPIRAMATE 100 MG/1
200 TABLET, FILM COATED ORAL 2 TIMES DAILY
Qty: 60 TABLET | Refills: 0 | Status: SHIPPED | OUTPATIENT
Start: 2018-08-07 | End: 2019-06-28

## 2018-08-07 RX ORDER — VENLAFAXINE HYDROCHLORIDE 150 MG/1
150 CAPSULE, EXTENDED RELEASE ORAL
Qty: 30 CAPSULE | Refills: 0 | Status: SHIPPED | OUTPATIENT
Start: 2018-08-08 | End: 2021-06-19

## 2018-08-07 RX ORDER — BENZTROPINE MESYLATE 0.5 MG/1
0.5 TABLET ORAL DAILY
Qty: 30 TABLET | Refills: 1 | Status: SHIPPED | OUTPATIENT
Start: 2018-08-07 | End: 2019-06-28

## 2018-08-07 RX ORDER — TRAZODONE HYDROCHLORIDE 50 MG/1
50 TABLET ORAL NIGHTLY PRN
Qty: 30 TABLET | Refills: 0 | Status: SHIPPED | OUTPATIENT
Start: 2018-08-07 | End: 2019-06-28

## 2018-08-07 RX ORDER — OMEPRAZOLE 20 MG/1
20 CAPSULE, DELAYED RELEASE ORAL DAILY
Qty: 90 CAPSULE | Refills: 0 | Status: SHIPPED | OUTPATIENT
Start: 2018-08-07 | End: 2018-08-16 | Stop reason: SDUPTHER

## 2018-08-07 RX ORDER — HALOPERIDOL DECANOATE 50 MG/ML
150 INJECTION INTRAMUSCULAR
Qty: 3 ML | Refills: 0 | Status: SHIPPED | OUTPATIENT
Start: 2018-08-07 | End: 2019-06-28

## 2018-08-07 RX ORDER — CARVEDILOL 3.12 MG/1
3.12 TABLET ORAL 2 TIMES DAILY WITH MEALS
Qty: 60 TABLET | Refills: 0 | Status: SHIPPED | OUTPATIENT
Start: 2018-08-07 | End: 2019-02-15 | Stop reason: SDUPTHER

## 2018-08-07 RX ORDER — ATORVASTATIN CALCIUM 20 MG/1
20 TABLET, FILM COATED ORAL DAILY
Qty: 90 TABLET | Refills: 0 | Status: SHIPPED | OUTPATIENT
Start: 2018-08-07 | End: 2018-11-28 | Stop reason: SDUPTHER

## 2018-08-07 RX ORDER — VENLAFAXINE HYDROCHLORIDE 150 MG/1
150 CAPSULE, EXTENDED RELEASE ORAL
Qty: 30 CAPSULE | Refills: 0 | Status: SHIPPED | OUTPATIENT
Start: 2018-08-08 | End: 2018-08-07

## 2018-08-07 RX ORDER — TRAZODONE HYDROCHLORIDE 50 MG/1
50 TABLET ORAL NIGHTLY PRN
Qty: 30 TABLET | Refills: 0 | Status: SHIPPED | OUTPATIENT
Start: 2018-08-07 | End: 2018-08-07

## 2018-08-07 RX ADMIN — TOPIRAMATE 200 MG: 100 TABLET, FILM COATED ORAL at 08:28

## 2018-08-07 RX ADMIN — VENLAFAXINE HYDROCHLORIDE 150 MG: 150 CAPSULE, EXTENDED RELEASE ORAL at 08:28

## 2018-08-07 RX ADMIN — LEVOTHYROXINE SODIUM 100 MCG: 100 TABLET ORAL at 06:44

## 2018-08-07 RX ADMIN — Medication 2000 UNITS: at 08:28

## 2018-08-07 RX ADMIN — PANTOPRAZOLE SODIUM 40 MG: 40 TABLET, DELAYED RELEASE ORAL at 06:44

## 2018-08-07 RX ADMIN — ATORVASTATIN CALCIUM 20 MG: 20 TABLET, FILM COATED ORAL at 08:28

## 2018-08-07 RX ADMIN — BENZTROPINE MESYLATE 0.5 MG: 1 TABLET ORAL at 09:44

## 2018-08-07 ASSESSMENT — PAIN SCALES - GENERAL
PAINLEVEL_OUTOF10: 0
PAINLEVEL_OUTOF10: 0

## 2018-08-07 NOTE — DISCHARGE SUMMARY
History of suicide attempt Z91.5    Depression F32.9    Suicide attempt ([facility] T14.91XA    Seizures ([facility] R56.9    Depression, major, single episode F32.9    Depressive disorder F32.9    Schizoaffective disorder, bipolar type ([facility] F25.0    Lithium toxicity T56.891A    Depression, acute F32.9    Depression with suicidal ideation F32.9, R39.200       Education and Follow-up:  Counseled:  [x] Patient     [] Family    [] Guardian      Cole Daniels   8/7/2018   8:34 AM

## 2018-08-16 DIAGNOSIS — K21.9 GASTROESOPHAGEAL REFLUX DISEASE, ESOPHAGITIS PRESENCE NOT SPECIFIED: ICD-10-CM

## 2018-08-16 RX ORDER — OMEPRAZOLE 20 MG/1
20 CAPSULE, DELAYED RELEASE ORAL DAILY
Qty: 90 CAPSULE | Refills: 1 | Status: SHIPPED | OUTPATIENT
Start: 2018-08-16 | End: 2019-02-15 | Stop reason: SDUPTHER

## 2018-08-29 DIAGNOSIS — R06.2 WHEEZING: Primary | ICD-10-CM

## 2018-08-31 ENCOUNTER — OFFICE VISIT (OUTPATIENT)
Dept: FAMILY MEDICINE CLINIC | Age: 48
End: 2018-08-31
Payer: COMMERCIAL

## 2018-08-31 ENCOUNTER — TELEPHONE (OUTPATIENT)
Dept: FAMILY MEDICINE CLINIC | Age: 48
End: 2018-08-31

## 2018-08-31 VITALS
TEMPERATURE: 97.5 F | HEIGHT: 63 IN | BODY MASS INDEX: 34.38 KG/M2 | WEIGHT: 194 LBS | OXYGEN SATURATION: 97 % | RESPIRATION RATE: 12 BRPM | DIASTOLIC BLOOD PRESSURE: 82 MMHG | SYSTOLIC BLOOD PRESSURE: 110 MMHG | HEART RATE: 80 BPM

## 2018-08-31 DIAGNOSIS — R05.3 CHRONIC COUGH: Primary | ICD-10-CM

## 2018-08-31 DIAGNOSIS — N39.41 URGE INCONTINENCE OF URINE: ICD-10-CM

## 2018-08-31 DIAGNOSIS — H61.21 IMPACTED CERUMEN OF RIGHT EAR: ICD-10-CM

## 2018-08-31 PROCEDURE — G8417 CALC BMI ABV UP PARAM F/U: HCPCS | Performed by: FAMILY MEDICINE

## 2018-08-31 PROCEDURE — 1036F TOBACCO NON-USER: CPT | Performed by: FAMILY MEDICINE

## 2018-08-31 PROCEDURE — G8427 DOCREV CUR MEDS BY ELIG CLIN: HCPCS | Performed by: FAMILY MEDICINE

## 2018-08-31 PROCEDURE — 99213 OFFICE O/P EST LOW 20 MIN: CPT | Performed by: FAMILY MEDICINE

## 2018-08-31 PROCEDURE — 1111F DSCHRG MED/CURRENT MED MERGE: CPT | Performed by: FAMILY MEDICINE

## 2018-08-31 RX ORDER — INCONTINENCE PAD,LINER,DISP
EACH MISCELLANEOUS
Qty: 56 EACH | Refills: 5 | Status: SHIPPED | OUTPATIENT
Start: 2018-08-31

## 2018-08-31 NOTE — PROGRESS NOTES
History and Physical    Patient:   50 y.o. female MRN: [id number]     Date of Service: 8/31/2018      Chief complaint:    History of Present Illness   The patient is a 50 y.o. female    Chief Complaint   Patient presents with    Cough     right ear feels stuffy hard time hearing, cough since she stopped smoking 3x months, phlem yellow                      CC: here due to chronic cough-right ear \"feels stuffy\", decreased hearing out of right ear    HPI:  Quit smoking 9/4/18 and since quitting has had intermittent cough. She has occasional sputum . No fever, no weight loss, feels well in general, no night sweats. She will occasionally wheeze but states it is lessened since she quit smoking and states the wheezing \"is a lot less\"     She denies GERD, she denies any sinus congestion or drainage and denies postnasal drainage   She has had a chest x-ry which was negative . She is not using her albuterol inhaler as she states \"I'm not having any trouble breathing so I'm not using it\" . Notes right ear feels clogged. No ear pain or drainage. Past Medical History:      Diagnosis Date    Arthritis     Asthma     Bipolar 1 disorder ([facility]     Borderline personality disorder in adolescent     CAD (coronary artery disease)     COPD (chronic obstructive pulmonary disease) ([facility]     Depression     History of blood transfusion     Hyperlipidemia     Hypertension     PTSD (post-traumatic stress disorder)     Schizo affective schizophrenia ([facility]     Seizures ([facility]     Thyroid disease        Past Surgical History:        Procedure Laterality Date    ABDOMEN SURGERY      CHOLECYSTECTOMY      ENDOSCOPY, COLON, DIAGNOSTIC      JOINT REPLACEMENT      SHOULDER SURGERY         Allergies:  Adhesive tape    Social History:   TOBACCO:   reports that she has quit smoking. She smoked 0.00 packs per day for 28.00 years. She has never used smokeless tobacco.  ETOH:   reports that she does not drink alcohol.       Family adenopathy  Chest - clear to auscultation, no wheezes, rales or rhonchi, she had symmetric air entry  Heart - normal rate and regular rhythm without ectopy   Extremities - no pedal edema on exam today  Skin - two discrete small, erythematous papules along belt line of abdomen, no pustules, no vesicles      Lay Cai was seen today for cough, right ear muffled hearing   Diagnoses and all orders for this visit:    Chronic cough  -     CT CHEST WO CONTRAST; Future  -     FULL PFT STUDY WITH PRE AND POST; Future  Former smoker with cough - previous chest x-ry was negative for any changes     Impacted cerumen of right ear  Irrigation of right ear performed by MA- ear reexamined- no cerumen after irrigation, no injection of the TM or the canal. She stated she felt much improved and had no decrease in hearing after the cerumen was removed. Urge incontinence of urine  -     Incontinence Supply Disposable (DEPEND PROTECTION BRIEFS LARGE) MISC; Depends Briefs to use 1-2 times daily for urinary incontinence      PFT and CT of the chest ordered for further evaluation of the cough. She refuses colonoscopy . She states her last pap was a year ago by gynecologist in [facility] -will obtain that report. Return for regularly scheduled visit in 6 mn-plan was discussed with the patient and she was in agreement. Jeff NUNES.

## 2018-09-10 NOTE — TELEPHONE ENCOUNTER
Form received, filled out by doctor, and faxed back to [facility]. Task complete.

## 2018-09-12 ENCOUNTER — HOSPITAL ENCOUNTER (OUTPATIENT)
Dept: CT IMAGING | Age: 48
Discharge: HOME | End: 2018-09-14
Payer: COMMERCIAL

## 2018-09-12 ENCOUNTER — HOSPITAL ENCOUNTER (OUTPATIENT)
Dept: PULMONOLOGY | Age: 48
Discharge: HOME | End: 2018-09-12
Payer: COMMERCIAL

## 2018-09-12 DIAGNOSIS — R05.3 CHRONIC COUGH: ICD-10-CM

## 2018-09-12 PROCEDURE — 94060 EVALUATION OF WHEEZING: CPT

## 2018-09-12 PROCEDURE — 71250 CT THORAX DX C-: CPT

## 2018-09-12 PROCEDURE — 94726 PLETHYSMOGRAPHY LUNG VOLUMES: CPT

## 2018-09-12 PROCEDURE — 94729 DIFFUSING CAPACITY: CPT

## 2018-09-14 PROCEDURE — 94060 EVALUATION OF WHEEZING: CPT | Performed by: INTERNAL MEDICINE

## 2018-09-14 PROCEDURE — 94729 DIFFUSING CAPACITY: CPT | Performed by: INTERNAL MEDICINE

## 2018-09-14 PROCEDURE — 94726 PLETHYSMOGRAPHY LUNG VOLUMES: CPT | Performed by: INTERNAL MEDICINE

## 2018-09-19 ENCOUNTER — HOSPITAL ENCOUNTER (OUTPATIENT)
Age: 48
Setting detail: OBSERVATION
Discharge: HOME | End: 2018-09-20
Attending: EMERGENCY MEDICINE | Admitting: INTERNAL MEDICINE
Payer: COMMERCIAL

## 2018-09-19 ENCOUNTER — APPOINTMENT (OUTPATIENT)
Dept: GENERAL RADIOLOGY | Age: 48
End: 2018-09-19
Payer: COMMERCIAL

## 2018-09-19 DIAGNOSIS — R07.9 CHEST PAIN, UNSPECIFIED TYPE: Primary | ICD-10-CM

## 2018-09-19 LAB
ALBUMIN SERPL-MCNC: 4.1 G/DL (ref 3.5–5.2)
ALP BLD-CCNC: 84 U/L (ref 35–104)
ALT SERPL-CCNC: 14 U/L (ref 0–32)
ANION GAP SERPL CALCULATED.3IONS-SCNC: 12 MMOL/L (ref 7–16)
AST SERPL-CCNC: 25 U/L (ref 0–31)
BILIRUB SERPL-MCNC: 0.6 MG/DL (ref 0–1.2)
BUN BLDV-MCNC: 9 MG/DL (ref 6–20)
CALCIUM SERPL-MCNC: 9.4 MG/DL (ref 8.6–10.2)
CHLORIDE BLD-SCNC: 97 MMOL/L (ref 98–107)
CO2: 23 MMOL/L (ref 22–29)
CREAT SERPL-MCNC: 0.5 MG/DL (ref 0.5–1)
D DIMER: <200 NG/ML DDU
EKG ATRIAL RATE: 70 BPM
EKG P AXIS: 39 DEGREES
EKG P-R INTERVAL: 172 MS
EKG Q-T INTERVAL: 418 MS
EKG QRS DURATION: 90 MS
EKG QTC CALCULATION (BAZETT): 451 MS
EKG R AXIS: 13 DEGREES
EKG T AXIS: 17 DEGREES
EKG VENTRICULAR RATE: 70 BPM
GFR AFRICAN AMERICAN: >60
GFR NON-AFRICAN AMERICAN: >60 ML/MIN/1.73
GLUCOSE BLD-MCNC: 90 MG/DL (ref 74–109)
HCT VFR BLD CALC: 38 % (ref 34–48)
HEMOGLOBIN: 12.3 G/DL (ref 11.5–15.5)
INR BLD: 1
MCH RBC QN AUTO: 28.3 PG (ref 26–35)
MCHC RBC AUTO-ENTMCNC: 32.4 % (ref 32–34.5)
MCV RBC AUTO: 87.6 FL (ref 80–99.9)
PDW BLD-RTO: 13.8 FL (ref 11.5–15)
PLATELET # BLD: 333 E9/L (ref 130–450)
PMV BLD AUTO: 9.9 FL (ref 7–12)
POTASSIUM SERPL-SCNC: 4.4 MMOL/L (ref 3.5–5)
PROTHROMBIN TIME: 11.1 SEC (ref 9.3–12.4)
RBC # BLD: 4.34 E12/L (ref 3.5–5.5)
SODIUM BLD-SCNC: 132 MMOL/L (ref 132–146)
TOTAL PROTEIN: 7.2 G/DL (ref 6.4–8.3)
TROPONIN: <0.01 NG/ML (ref 0–0.03)
WBC # BLD: 9 E9/L (ref 4.5–11.5)

## 2018-09-19 PROCEDURE — 99285 EMERGENCY DEPT VISIT HI MDM: CPT

## 2018-09-19 PROCEDURE — 85610 PROTHROMBIN TIME: CPT

## 2018-09-19 PROCEDURE — 93005 ELECTROCARDIOGRAM TRACING: CPT | Performed by: EMERGENCY MEDICINE

## 2018-09-19 PROCEDURE — 85378 FIBRIN DEGRADE SEMIQUANT: CPT

## 2018-09-19 PROCEDURE — G0378 HOSPITAL OBSERVATION PER HR: HCPCS

## 2018-09-19 PROCEDURE — 36415 COLL VENOUS BLD VENIPUNCTURE: CPT

## 2018-09-19 PROCEDURE — 85027 COMPLETE CBC AUTOMATED: CPT

## 2018-09-19 PROCEDURE — 84484 ASSAY OF TROPONIN QUANT: CPT

## 2018-09-19 PROCEDURE — 71045 X-RAY EXAM CHEST 1 VIEW: CPT

## 2018-09-19 PROCEDURE — 80053 COMPREHEN METABOLIC PANEL: CPT

## 2018-09-19 ASSESSMENT — PAIN DESCRIPTION - ORIENTATION: ORIENTATION: LEFT

## 2018-09-19 ASSESSMENT — PAIN SCALES - GENERAL: PAINLEVEL_OUTOF10: 3

## 2018-09-19 ASSESSMENT — PAIN DESCRIPTION - DESCRIPTORS: DESCRIPTORS: SHARP

## 2018-09-19 ASSESSMENT — PAIN DESCRIPTION - LOCATION
LOCATION: CHEST
LOCATION: CHEST

## 2018-09-19 ASSESSMENT — PAIN DESCRIPTION - PAIN TYPE: TYPE: ACUTE PAIN

## 2018-09-19 NOTE — ED PROVIDER NOTES
HPI:  9/19/18, Time: 7:30 PM         Singh Desir is a 50 y.o. female presenting to the ED for chest pain, beginning 2 days ago. The complaint has been intermittent, moderate in severity, and worsened by nothing. Patient reports intermittent chest pains. Patient reports pain radiates to left shoulder. Patient reports some chronic cough as well. Patient reports nausea but no vomiting. She reports no fever. There is no leg pain or swelling. Pain does worsen when she takes a deep breath. Patient reports history of COPD she does have a history of CAD     ROS:   Pertinent positives and negatives are stated within HPI, all other systems reviewed and are negative.  --------------------------------------------- PAST HISTORY ---------------------------------------------  Past Medical History:  has a past medical history of Arthritis; Asthma; [facility] ([facility] Borderline personality disorder in adolescent; CAD (coronary artery disease); COPD (chronic obstructive pulmonary disease) ([facility] Depression; History of blood transfusion; Hyperlipidemia; Hypertension; PTSD (post-traumatic stress disorder); Schizo affective schizophrenia ([facility] Seizures ([facility] and Thyroid disease. Past Surgical History:  has a past surgical history that includes joint replacement; Cholecystectomy; shoulder surgery; Abdomen surgery; and Endoscopy, colon, diagnostic. Social History:  reports that she has quit smoking. She smoked 0.00 packs per day for 28.00 years. She has never used smokeless tobacco. She reports that she does not drink alcohol or use drugs. Family History: family history includes High Blood Pressure in her mother; No Known Problems in her father. The patients home medications have been reviewed.     Allergies: Adhesive tape    ---------------------------------------------------PHYSICAL EXAM--------------------------------------    Constitutional/General: Alert and oriented x3, well appearing, non toxic in NAD  Head: Normocephalic and atraumatic  Eyes: PERRL, EOMI  Mouth: Oropharynx clear, handling secretions, no trismus  Neck: Supple, full ROM, non tender to palpation in the midline, no stridor, no crepitus, no meningeal signs  Pulmonary: Lungs clear to auscultation bilaterally, no wheezes, rales, or rhonchi. Not in respiratory distress  Cardiovascular:  Regular rate. Regular rhythm. No murmurs, gallops, or rubs. 2+ distal pulses  Chest: no chest wall tenderness  Abdomen: Soft. Non tender. Non distended. +BS. No rebound, guarding, or rigidity. No pulsatile masses appreciated. Musculoskeletal: Moves all extremities x 4. Warm and well perfused, no clubbing, cyanosis, or edema. Capillary refill <3 seconds  Skin: warm and dry. No rashes. Neurologic: GCS 15, CN 2-12 grossly intact, no focal deficits, symmetric strength 5/5 in the upper and lower extremities bilaterally  Psych: Normal Affect    -------------------------------------------------- RESULTS -------------------------------------------------  I have personally reviewed all laboratory and imaging results for this patient. Results are listed below.      LABS:  Results for orders placed or performed during the hospital encounter of 09/19/18   CBC   Result Value Ref Range    WBC 9.0 4.5 - 11.5 E9/L    RBC 4.34 3.50 - 5.50 E12/L    Hemoglobin 12.3 11.5 - 15.5 g/dL    Hematocrit 38.0 34.0 - 48.0 %    MCV 87.6 80.0 - 99.9 fL    MCH 28.3 26.0 - 35.0 pg    MCHC 32.4 32.0 - 34.5 %    RDW 13.8 11.5 - 15.0 fL    Platelets 107 092 - 985 E9/L    MPV 9.9 7.0 - 12.0 fL   Comprehensive Metabolic Panel   Result Value Ref Range    Sodium 132 132 - 146 mmol/L    Potassium 4.4 3.5 - 5.0 mmol/L    Chloride 97 (L) 98 - 107 mmol/L    CO2 23 22 - 29 mmol/L    Anion Gap 12 7 - 16 mmol/L    Glucose 90 74 - 109 mg/dL    BUN 9 6 - 20 mg/dL    CREATININE 0.5 0.5 - 1.0 mg/dL    GFR Non-African American >60 >=60 mL/min/1.73    GFR African American >60     Calcium 9.4 8.6 - 10.2 mg/dL Total Protein 7.2 6.4 - 8.3 g/dL    Alb 4.1 3.5 - 5.2 g/dL    Total Bilirubin 0.6 0.0 - 1.2 mg/dL    Alkaline Phosphatase 84 35 - 104 U/L    ALT 14 0 - 32 U/L    AST 25 0 - 31 U/L   Troponin   Result Value Ref Range    Troponin <0.01 0.00 - 0.03 ng/mL   Protime-INR   Result Value Ref Range    Protime 11.1 9.3 - 12.4 sec    INR 1.0    D-dimer, quantitative   Result Value Ref Range    D-Dimer, Quant <200 ng/mL DDU   Troponin   Result Value Ref Range    Troponin <0.01 0.00 - 0.03 ng/mL   EKG 12 Lead   Result Value Ref Range    Ventricular Rate 70 BPM    Atrial Rate 70 BPM    P-R Interval 172 ms    QRS Duration 90 ms    Q-T Interval 418 ms    QTc Calculation (Bazett) 451 ms    P Axis 39 degrees    R Axis 13 degrees    T Axis 17 degrees       RADIOLOGY:  Interpreted by Radiologist.  XR CHEST PORTABLE   Final Result   No airspace opacities or pleural effusion. NM MYOCARDIAL SPECT REST EXERCISE OR RX    (Results Pending)       EKG: This EKG is signed and interpreted by me. Rate: 70  Rhythm: Sinus  Interpretation: non-specific EKG  Comparison: 3/31/18  There was ST depression that was significant on prior EKG today's EKG there is less depression      ------------------------- NURSING NOTES AND VITALS REVIEWED ---------------------------   The nursing notes within the ED encounter and vital signs as below have been reviewed by myself. /65   Pulse 71   Temp 97.2 °F (36.2 °C) (Temporal)   Resp 18   Ht 5' 2\" (1.575 m)   Wt 193 lb (87.5 kg)   LMP 02/13/2014 (Approximate)   SpO2 100%   BMI 35.30 kg/m²   Oxygen Saturation Interpretation: Normal    The patients available past medical records and past encounters were reviewed.         ------------------------------ ED COURSE/MEDICAL DECISION MAKING----------------------  Medications   acetaminophen (TYLENOL) tablet 650 mg (not administered)   atorvastatin (LIPITOR) tablet 40 mg (not administered)   benztropine (COGENTIN) tablet 0.5 mg (not administered) 325 Florencio Juarez MD  09/20/18 7112

## 2018-09-20 ENCOUNTER — APPOINTMENT (OUTPATIENT)
Dept: NUCLEAR MEDICINE | Age: 48
End: 2018-09-20
Payer: COMMERCIAL

## 2018-09-20 VITALS
TEMPERATURE: 97.4 F | OXYGEN SATURATION: 97 % | WEIGHT: 193 LBS | BODY MASS INDEX: 35.51 KG/M2 | HEART RATE: 80 BPM | HEIGHT: 62 IN | DIASTOLIC BLOOD PRESSURE: 86 MMHG | SYSTOLIC BLOOD PRESSURE: 117 MMHG | RESPIRATION RATE: 16 BRPM

## 2018-09-20 DIAGNOSIS — R05.3 CHRONIC COUGH: Primary | ICD-10-CM

## 2018-09-20 LAB
ANION GAP SERPL CALCULATED.3IONS-SCNC: 19 MMOL/L (ref 7–16)
BASOPHILS ABSOLUTE: 0.07 E9/L (ref 0–0.2)
BASOPHILS RELATIVE PERCENT: 1 % (ref 0–2)
BUN BLDV-MCNC: 8 MG/DL (ref 6–20)
CALCIUM SERPL-MCNC: 9.6 MG/DL (ref 8.6–10.2)
CHLORIDE BLD-SCNC: 104 MMOL/L (ref 98–107)
CO2: 19 MMOL/L (ref 22–29)
CREAT SERPL-MCNC: 0.5 MG/DL (ref 0.5–1)
EOSINOPHILS ABSOLUTE: 0.08 E9/L (ref 0.05–0.5)
EOSINOPHILS RELATIVE PERCENT: 1.2 % (ref 0–6)
GFR AFRICAN AMERICAN: >60
GFR NON-AFRICAN AMERICAN: >60 ML/MIN/1.73
GLUCOSE BLD-MCNC: 107 MG/DL (ref 74–109)
HCT VFR BLD CALC: 41.2 % (ref 34–48)
HEMOGLOBIN: 13 G/DL (ref 11.5–15.5)
IMMATURE GRANULOCYTES #: 0.02 E9/L
IMMATURE GRANULOCYTES %: 0.3 % (ref 0–5)
LV EF: 69 %
LVEF MODALITY: NORMAL
LYMPHOCYTES ABSOLUTE: 2.27 E9/L (ref 1.5–4)
LYMPHOCYTES RELATIVE PERCENT: 33.8 % (ref 20–42)
MAGNESIUM: 2.2 MG/DL (ref 1.6–2.6)
MCH RBC QN AUTO: 28.1 PG (ref 26–35)
MCHC RBC AUTO-ENTMCNC: 31.6 % (ref 32–34.5)
MCV RBC AUTO: 89.2 FL (ref 80–99.9)
MONOCYTES ABSOLUTE: 0.42 E9/L (ref 0.1–0.95)
MONOCYTES RELATIVE PERCENT: 6.3 % (ref 2–12)
NEUTROPHILS ABSOLUTE: 3.85 E9/L (ref 1.8–7.3)
NEUTROPHILS RELATIVE PERCENT: 57.4 % (ref 43–80)
PDW BLD-RTO: 14.2 FL (ref 11.5–15)
PLATELET # BLD: 333 E9/L (ref 130–450)
PMV BLD AUTO: 9.4 FL (ref 7–12)
POTASSIUM REFLEX MAGNESIUM: 4.1 MMOL/L (ref 3.5–5)
RBC # BLD: 4.62 E12/L (ref 3.5–5.5)
SODIUM BLD-SCNC: 142 MMOL/L (ref 132–146)
TROPONIN: <0.01 NG/ML (ref 0–0.03)
TROPONIN: <0.01 NG/ML (ref 0–0.03)
WBC # BLD: 6.7 E9/L (ref 4.5–11.5)

## 2018-09-20 PROCEDURE — 6370000000 HC RX 637 (ALT 250 FOR IP): Performed by: INTERNAL MEDICINE

## 2018-09-20 PROCEDURE — G0378 HOSPITAL OBSERVATION PER HR: HCPCS

## 2018-09-20 PROCEDURE — 83735 ASSAY OF MAGNESIUM: CPT

## 2018-09-20 PROCEDURE — 96372 THER/PROPH/DIAG INJ SC/IM: CPT

## 2018-09-20 PROCEDURE — 84484 ASSAY OF TROPONIN QUANT: CPT

## 2018-09-20 PROCEDURE — 78452 HT MUSCLE IMAGE SPECT MULT: CPT

## 2018-09-20 PROCEDURE — 6360000002 HC RX W HCPCS: Performed by: INTERNAL MEDICINE

## 2018-09-20 PROCEDURE — A9500 TC99M SESTAMIBI: HCPCS | Performed by: RADIOLOGY

## 2018-09-20 PROCEDURE — 93017 CV STRESS TEST TRACING ONLY: CPT

## 2018-09-20 PROCEDURE — 85025 COMPLETE CBC W/AUTO DIFF WBC: CPT

## 2018-09-20 PROCEDURE — 80048 BASIC METABOLIC PNL TOTAL CA: CPT

## 2018-09-20 PROCEDURE — 2580000003 HC RX 258: Performed by: INTERNAL MEDICINE

## 2018-09-20 PROCEDURE — 99219 PR INITIAL OBSERVATION CARE/DAY 50 MINUTES: CPT | Performed by: INTERNAL MEDICINE

## 2018-09-20 PROCEDURE — 3430000000 HC RX DIAGNOSTIC RADIOPHARMACEUTICAL: Performed by: RADIOLOGY

## 2018-09-20 PROCEDURE — 36415 COLL VENOUS BLD VENIPUNCTURE: CPT

## 2018-09-20 RX ORDER — IBUPROFEN 200 MG
400 TABLET ORAL EVERY 8 HOURS PRN
Qty: 30 TABLET | Refills: 0 | COMMUNITY
Start: 2018-09-20 | End: 2019-06-28

## 2018-09-20 RX ORDER — SODIUM CHLORIDE 0.9 % (FLUSH) 0.9 %
10 SYRINGE (ML) INJECTION EVERY 12 HOURS SCHEDULED
Status: DISCONTINUED | OUTPATIENT
Start: 2018-09-20 | End: 2018-09-20 | Stop reason: HOSPADM

## 2018-09-20 RX ORDER — TRAZODONE HYDROCHLORIDE 50 MG/1
50 TABLET ORAL NIGHTLY PRN
Status: DISCONTINUED | OUTPATIENT
Start: 2018-09-20 | End: 2018-09-20 | Stop reason: HOSPADM

## 2018-09-20 RX ORDER — CARVEDILOL 3.12 MG/1
3.12 TABLET ORAL 2 TIMES DAILY WITH MEALS
Status: DISCONTINUED | OUTPATIENT
Start: 2018-09-20 | End: 2018-09-20 | Stop reason: HOSPADM

## 2018-09-20 RX ORDER — SODIUM CHLORIDE 0.9 % (FLUSH) 0.9 %
10 SYRINGE (ML) INJECTION EVERY 12 HOURS SCHEDULED
Status: DISCONTINUED | OUTPATIENT
Start: 2018-09-20 | End: 2018-09-20 | Stop reason: SDUPTHER

## 2018-09-20 RX ORDER — VENLAFAXINE HYDROCHLORIDE 150 MG/1
150 CAPSULE, EXTENDED RELEASE ORAL
Status: DISCONTINUED | OUTPATIENT
Start: 2018-09-20 | End: 2018-09-20 | Stop reason: HOSPADM

## 2018-09-20 RX ORDER — SODIUM CHLORIDE 0.9 % (FLUSH) 0.9 %
10 SYRINGE (ML) INJECTION PRN
Status: DISCONTINUED | OUTPATIENT
Start: 2018-09-20 | End: 2018-09-20 | Stop reason: HOSPADM

## 2018-09-20 RX ORDER — HYDROXYZINE HYDROCHLORIDE 10 MG/1
50 TABLET, FILM COATED ORAL EVERY 8 HOURS PRN
Status: DISCONTINUED | OUTPATIENT
Start: 2018-09-20 | End: 2018-09-20 | Stop reason: HOSPADM

## 2018-09-20 RX ORDER — ACETAMINOPHEN 325 MG/1
650 TABLET ORAL EVERY 4 HOURS PRN
Status: DISCONTINUED | OUTPATIENT
Start: 2018-09-20 | End: 2018-09-20 | Stop reason: HOSPADM

## 2018-09-20 RX ORDER — CHOLECALCIFEROL (VITAMIN D3) 50 MCG
2000 TABLET ORAL DAILY
Status: DISCONTINUED | OUTPATIENT
Start: 2018-09-20 | End: 2018-09-20 | Stop reason: HOSPADM

## 2018-09-20 RX ORDER — TOPIRAMATE 100 MG/1
200 TABLET, FILM COATED ORAL 2 TIMES DAILY
Status: DISCONTINUED | OUTPATIENT
Start: 2018-09-20 | End: 2018-09-20 | Stop reason: HOSPADM

## 2018-09-20 RX ORDER — LEVOTHYROXINE SODIUM 0.1 MG/1
100 TABLET ORAL DAILY
Status: DISCONTINUED | OUTPATIENT
Start: 2018-09-20 | End: 2018-09-20 | Stop reason: HOSPADM

## 2018-09-20 RX ORDER — ATORVASTATIN CALCIUM 40 MG/1
40 TABLET, FILM COATED ORAL DAILY
Qty: 30 TABLET | Refills: 3 | Status: CANCELLED | OUTPATIENT
Start: 2018-09-21

## 2018-09-20 RX ORDER — BENZTROPINE MESYLATE 0.5 MG/1
0.5 TABLET ORAL DAILY
Status: DISCONTINUED | OUTPATIENT
Start: 2018-09-20 | End: 2018-09-20 | Stop reason: HOSPADM

## 2018-09-20 RX ORDER — ATORVASTATIN CALCIUM 40 MG/1
40 TABLET, FILM COATED ORAL DAILY
Status: DISCONTINUED | OUTPATIENT
Start: 2018-09-20 | End: 2018-09-20 | Stop reason: HOSPADM

## 2018-09-20 RX ORDER — ONDANSETRON 2 MG/ML
4 INJECTION INTRAMUSCULAR; INTRAVENOUS EVERY 6 HOURS PRN
Status: DISCONTINUED | OUTPATIENT
Start: 2018-09-20 | End: 2018-09-20 | Stop reason: HOSPADM

## 2018-09-20 RX ORDER — ALBUTEROL SULFATE 90 UG/1
2 AEROSOL, METERED RESPIRATORY (INHALATION) EVERY 6 HOURS PRN
Status: DISCONTINUED | OUTPATIENT
Start: 2018-09-20 | End: 2018-09-20 | Stop reason: HOSPADM

## 2018-09-20 RX ORDER — SODIUM CHLORIDE 0.9 % (FLUSH) 0.9 %
10 SYRINGE (ML) INJECTION PRN
Status: DISCONTINUED | OUTPATIENT
Start: 2018-09-20 | End: 2018-09-20 | Stop reason: SDUPTHER

## 2018-09-20 RX ADMIN — Medication 10 MILLICURIE: at 08:31

## 2018-09-20 RX ADMIN — ENOXAPARIN SODIUM 40 MG: 40 INJECTION SUBCUTANEOUS at 11:56

## 2018-09-20 RX ADMIN — ATORVASTATIN CALCIUM 40 MG: 40 TABLET, FILM COATED ORAL at 11:56

## 2018-09-20 RX ADMIN — CARVEDILOL 3.12 MG: 3.12 TABLET, FILM COATED ORAL at 14:50

## 2018-09-20 RX ADMIN — Medication 10 ML: at 11:56

## 2018-09-20 RX ADMIN — TOPIRAMATE 200 MG: 100 TABLET, FILM COATED ORAL at 11:56

## 2018-09-20 RX ADMIN — CHOLECALCIFEROL TAB 50 MCG (2000 UNIT) 2000 UNITS: 50 TAB at 11:56

## 2018-09-20 RX ADMIN — VENLAFAXINE HYDROCHLORIDE 150 MG: 150 CAPSULE, EXTENDED RELEASE ORAL at 11:56

## 2018-09-20 RX ADMIN — Medication 33 MILLICURIE: at 10:31

## 2018-09-20 RX ADMIN — REGADENOSON 0.4 MG: 0.08 INJECTION, SOLUTION INTRAVENOUS at 10:25

## 2018-09-20 ASSESSMENT — PAIN SCALES - GENERAL
PAINLEVEL_OUTOF10: 0

## 2018-09-20 NOTE — H&P
[facility]  Internal Medicine Residency Program  History and Physical    Patient:  Desiree Ayala 50 y.o. female MRN: [id number]     Date of Service: 9/20/2018    Hospital Day: 2      Chief complaint: Chest pain  History of Present Illness   The patient is a 50 y.o. female with a significant past medical history of extensive psychiatry disorder, hypothyroidism, HTN, HLD, COPD, GERD, and multiple psych admissions, who consulted for chest pain. Patient reports that has been having midsternal chest pain, that has worsened for the past 2 days, squeezing in quality, 7/10 that is triggered by exertion, aggravated by deep inspiration and is relieved by rest, associated with left arm pain. She denies any palpitations, SOB, leg swelling. She has no family history of CAD, and denies any substance abuse. Of note, patient has been worked up for COPD by her PCP. Upon arrival to the ED, troponin were negative, EKG revealed non specific ST changes. Past Medical History:      Diagnosis Date    Arthritis     Asthma     Bipolar 1 disorder ([address]     Borderline personality disorder in adolescent     CAD (coronary artery disease)     COPD (chronic obstructive pulmonary disease) ([address]     Depression     History of blood transfusion     Hyperlipidemia     Hypertension     PTSD (post-traumatic stress disorder)     [facility]     Seizures [facility]     Thyroid disease        Past Surgical History:        Procedure Laterality Date    ABDOMEN SURGERY      CHOLECYSTECTOMY      ENDOSCOPY, COLON, DIAGNOSTIC      JOINT REPLACEMENT      SHOULDER SURGERY         Medications Prior to Admission:    Prior to Admission medications    Medication Sig Start Date End Date Taking?  Authorizing Provider   Incontinence Supply Disposable (DEPEND PROTECTION BRIEFS LARGE) MISC Depends Briefs to use 1-2 times daily for urinary incontinence 8/31/18   DO NEREYDA Barboza  (50 Base) MCG/ACT inhaler INHALE TWO PUFFS BY MOUTH EVERY SIX HOURS AS NEEDED FOR WHEEZING 8/29/18   Abraham Craig DO   omeprazole (PRILOSEC) 20 MG delayed release capsule Take 1 capsule by mouth daily 8/16/18   Abraham Craig DO   topiramate (TOPAMAX) 100 MG tablet Take 2 tablets by mouth 2 times daily 8/7/18   JOSE ANGEL Maldonado CNP   traZODone (DESYREL) 50 MG tablet Take 1 tablet by mouth nightly as needed for Sleep 8/7/18   JOSE ANGEL Maldonado CNP   venlafaxine (EFFEXOR XR) 150 MG extended release capsule Take 1 capsule by mouth daily (with breakfast) 8/8/18   JOSE ANGEL Maldonado CNP   atorvastatin (LIPITOR) 20 MG tablet Take 1 tablet by mouth daily 8/7/18   [facility] JOSE ANGEL Velarde CNP   benztropine (COGENTIN) 0.5 MG tablet Take 1 tablet by mouth daily 8/7/18   JOSE ANGEL Maldonado CNP   haloperidol decanoate (HALDOL DECANOATE) 50 MG/ML injection Inject 3 mLs into the muscle every 30 days 8/7/18   JOSE ANGEL Maldonado CNP   carvedilol (COREG) 3.125 MG tablet Take 1 tablet by mouth 2 times daily (with meals) 8/7/18   JOSE ANGEL Maldonado CNP   nicotine (NICODERM CQ) 7 MG/24HR Place 1 patch onto the skin every 24 hours 8/7/18   JOSE ANGEL Maldonado CNP   Cholecalciferol (VITAMIN D) 2000 units TABS tablet Take 1 tablet by mouth daily 8/7/18   JOSE ANGEL Maldonado CNP   levothyroxine (SYNTHROID) 100 MCG tablet Take 1 tablet by mouth Daily 8/7/18   [facility] laughlin CNP   hydrOXYzine (ATARAX) 50 MG tablet Take 50 mg by mouth every 8 hours as needed     Historical Provider, MD       Allergies:  Adhesive tape    Social History:   TOBACCO:   reports that she has quit smoking. She smoked 0.00 packs per day for 28.00 years. She has never used smokeless tobacco.  ETOH:   reports that she does not drink alcohol.       Family History:   Family History   Problem Relation Age of Onset    High Blood Pressure Mother     No Known Problems Father        REVIEW OF SYSTEMS:    · Constitutional: No fever, no chills, no change in weight; good appetite  · HEENT: No blurred vision, no ear problems, no sore throat, no rhinorrhea. · Respiratory: Scant cough, no sputum production, no pleuritic chest pain, no shortness of breath  · Cardiology: Posiive for chest pain, no dyspnea on exertion, no paroxysmal nocturnal dyspnea, no orthopnea, no palpitation, no leg swelling. · Gastroenterology: No dysphagia, no reflux; no abdominal pain, no nausea or vomiting; no constipation or diarrhea.  No hematochezia   · Genitourinary: No dysuria, no frequency, hesitancy; no hematuria  · Musculoskeletal: no joint pain, no myalgia, no change in range of movement  · Neurology: no focal weakness in extremities, no slurred speech, no double vision, no tingling or numbness sensation  · Endocrinology: no temperature intolerance, no polyphagia, polydipsia or polyuria  · Hematology: no increased bleeding, no bruising, no lymphadenopathy  · Skin: no skin changes noticed by patient  · Psychology: no depressed mood, no suicidal ideation    Physical Exam   · Vitals: /65   Pulse 71   Temp 97.2 °F (36.2 °C) (Temporal)   Resp 18   Ht 5' 2\" (1.575 m)   Wt 193 lb (87.5 kg)   LMP 02/13/2014 (Approximate)   SpO2 100%   BMI 35.30 kg/m²     · General Appearance: alert and oriented to person, place and time, well-developed and well-nourished, in no acute distress  · Head: normocephalic and atraumatic  · Eyes: pupils equal, round, and reactive to light, extraocular eye movements intact, conjunctivae normal  · Neck: neck supple and non tender without mass, no thyromegaly or thyroid nodules, no cervical lymphadenopathy   · Pulmonary/Chest: clear to auscultation bilaterally- no wheezes, rales or rhonchi, normal air movement, no respiratory distress  · Cardiovascular: normal rate, normal S1 and S2, no gallops, intact distal pulses and no carotid bruits  · Abdomen: soft, non-tender, non-distended, normal bowel sounds, no masses or organomegaly  · Musculoskeletal: normal range of motion, no joint swelling, deformity or tenderness  · Neurologic: gait and coordination normal and speech normal   Labs and Imaging Studies   Basic Labs  Recent Labs      18   NA  132   K  4.4   CL  97*   CO2  23   BUN  9   CREATININE  0.5   GLUCOSE  90   CALCIUM  9.4       Recent Labs      18   WBC  9.0   RBC  4.34   HGB  12.3   HCT  38.0   MCV  87.6   MCH  28.3   MCHC  32.4   RDW  13.8   PLT  333   MPV  9.9       CBC:   Lab Results   Component Value Date    WBC 9.0 2018    RBC 4.34 2018    HGB 12.3 2018    HCT 38.0 2018    MCV 87.6 2018    RDW 13.8 2018     2018     HFP:    Lab Results   Component Value Date    PROT 7.2 2018       Imaging Studies:     Ct Chest Wo Contrast    Result Date: 2018  Patient MRN:  [id number] : 1970 Age: 50 years Gender: Female EXAM: CT CHEST WO CONTRAST INDICATION: R05 Chronic cough chronic cough in a former smoker COMPARISON: Chest x-ray 2018 TECHNIQUE: Low-dose CT  acquisition technique included one of following options: 1 . Automated exposure control 2. Adjustment of MA and or KV according to patient's size or 3. Use of iterative reconstruction. FINDINGS: Heart is unremarkable size. No pericardial effusion. No suspicious mediastinal, hilar, or axillary adenopathy identified per CT criteria. Lungs are clear free focal airspace consolidation, pleural effusion or pneumothorax. No suspicious pulmonary nodule identified. Mild degenerative change involving the thoracic spine. Previous cholecystectomy. No acute pleural parenchymal disease.  Essentially unremarkable CT chest without contrast.    Xr Chest Portable    Result Date: 2018  Patient MRN:  [id number] : 1970 Age: 50 years Gender: Female Order Date:  2018 7:00 PM EXAM: XR CHEST PORTABLE NUMBER OF VIEWS:  1 INDICATION:  chest pain chest pain COMPARISON: CT chest 9/12/2018 FINDINGS: The heart is normal in size. The mediastinum is normal in width. There is a normal appearance to the pulmonary vasculature. No focal airspace opacity. There is no pleural effusion. There is no pneumothorax. No airspace opacities or pleural effusion. EKG: nonspecific ST and T waves changes. Resident's Assessment and Plan     Assessment  1. Atypical chest pain  2. Hypertension  3. Hypothyroidism  4. GERD  5. ?COPD    Plan  1. Given her risk factors and symptoms, she is considered moderate risk for CAD, therefore further workup is indicated. Trend troponins. Given non specific ST changes will order pharmacologic stress test. Monitor cardiac enzymes  2. Resume home meds, monitor BP  3. Resume levothyroxine   4. PPI  5. Resume inhalers    Disposition: home if cardiac workup is negative    Rubie Ganser, M.D.   Internal Medicine Resident - PGY 2    Discussed with Attending: Dr. Esmer Recio    Attending physician: Dr. Lata Lord

## 2018-09-20 NOTE — PROGRESS NOTES
[facility]   Department of Pharmacy   Pharmacist Transition of Care Services         Patient Demographics  Name:  Arianne [facility] Record Number:  [id number]  Gender:  female   Age:  50 y.o. YOB: 1970    Address:    [place] [address]  Phone number:  [phone]    Admission date: 9/19/2018  Admission Diagnosis:  Chest pain [R07.9]  Admitting Physician: Liliana Shine MD    Primary Care Physician: Wilbur Rubinstein, DO  Primary Care Physician phone number:  [phone]  Outpatient Preferred Pharmacy:   [address] [facility], [address] [address]  Phone: [phone] Fax: Araceli Dawkins [address], [address] [facility]  [address]  Phone: [phone] Fax: [phone]        Readmission Risk (% from EPIC Patient List): 23%       Patient plans to participate in [address] [place] [address] ANNISTON (Y/N): Unanswered      Pharmacist Review and Summary of Medication Changes Made During Admission     Date of last reviewed/update: 9/20/18    Sources(s) of medication information (check all that apply):  [] EPIC - documentation for current admission  [] EPIC - documentation of recent physician office visit, Care Everywhere chart   [] Patient - interview or personal medication list   [] Patient's family/caretaker/POA/friend   [] Outpatient Pharmacy - phone call   [] Outpatient Pharmacy - medication bottles  [] Physician's office - phone call   [] OARRS Report  [] Nursing home/rehab/assisted living - printed medication list  [] Nursing home/rehab/assisted living - phone call      Category Comments  (Include Pharmacist Initials and Date)   Change in Outpatient Medication  (Dosage Form, Route,   Dose, or Frequency) 1. New Medication Started 1.  Ibuprofen 200 mg po q8h prn

## 2018-09-20 NOTE — DISCHARGE SUMMARY
bruit, no JVD, supple, symmetrical, trachea midline and thyroid not enlarged, symmetric, no tenderness/mass/nodules  · Lung: clear to auscultation bilaterally  · Heart: regular rate and rhythm, S1, S2 normal, no murmur, click, rub or gallop, (+) midsternal pain upon palpation  · Abdomen: soft, non-tender; bowel sounds normal; no masses,  no organomegaly  · Extremities:  extremities normal, atraumatic, no cyanosis or edema  · Musculokeletal: No joint swelling, no muscle tenderness. ROM normal in all joints of extremities. · Neurologic: Mental status: Alert, oriented, thought content appropriate    Pending test results: None    Consults:  1. Cardiology    Procedures:  1. None    Condition at discharge: Stable    Disposition: home       Medication List      START taking these medications    ibuprofen 200 MG tablet  Commonly known as:  ADVIL  Take 2 tablets by mouth every 8 hours as needed for Pain Take with food.         CONTINUE taking these medications    atorvastatin 20 MG tablet  Commonly known as:  LIPITOR  Take 1 tablet by mouth daily     benztropine 0.5 MG tablet  Commonly known as:  COGENTIN  Take 1 tablet by mouth daily     carvedilol 3.125 MG tablet  Commonly known as:  COREG  Take 1 tablet by mouth 2 times daily (with meals)     DEPEND PROTECTION BRIEFS LARGE Misc  Depends Briefs to use 1-2 times daily for urinary incontinence     haloperidol decanoate 50 MG/ML injection  Commonly known as:  HALDOL DECANOATE  Inject 3 mLs into the muscle every 30 days     hydrOXYzine 50 MG tablet  Commonly known as:  ATARAX     levothyroxine 100 MCG tablet  Commonly known as:  SYNTHROID  Take 1 tablet by mouth Daily     nicotine 7 MG/24HR  Commonly known as:  NICODERM CQ  Place 1 patch onto the skin every 24 hours     omeprazole 20 MG delayed release capsule  Commonly known as:  PRILOSEC  Take 1 capsule by mouth daily     PROAIR  (90 Base) MCG/ACT inhaler  Generic drug:  albuterol sulfate HFA  INHALE TWO PUFFS BY MOUTH EVERY SIX HOURS AS NEEDED FOR WHEEZING     topiramate 100 MG tablet  Commonly known as:  TOPAMAX  Take 2 tablets by mouth 2 times daily     traZODone 50 MG tablet  Commonly known as:  DESYREL  Take 1 tablet by mouth nightly as needed for Sleep     venlafaxine 150 MG extended release capsule  Commonly known as:  EFFEXOR XR  Take 1 capsule by mouth daily (with breakfast)     vitamin D 2000 units Tabs tablet  Take 1 tablet by mouth daily           Where to Get Your Medications      You can get these medications from any pharmacy    You don't need a prescription for these medications  · ibuprofen 200 MG tablet       Activity: activity as tolerated  Diet: low fat, low cholesterol diet     Patient Instructions:   Be compliant with medication  NEW MEDS:  Ibuprofen 200 mg PRN for pain    Follow up with:   PCP- Dr. Galen Bradley- 9/26/2018 2:20 PM  Ramona August6  Abena Barragan MD  PGY 1   10:14 PM 9/22/2018

## 2018-09-21 ENCOUNTER — TELEPHONE (OUTPATIENT)
Dept: FAMILY MEDICINE CLINIC | Age: 48
End: 2018-09-21

## 2018-09-24 ENCOUNTER — HOSPITAL ENCOUNTER (EMERGENCY)
Age: 48
Discharge: HOME | End: 2018-09-25
Attending: EMERGENCY MEDICINE
Payer: COMMERCIAL

## 2018-09-24 ENCOUNTER — APPOINTMENT (OUTPATIENT)
Dept: CT IMAGING | Age: 48
End: 2018-09-24
Payer: COMMERCIAL

## 2018-09-24 DIAGNOSIS — R42 DIZZINESS: ICD-10-CM

## 2018-09-24 DIAGNOSIS — S09.90XA INJURY OF HEAD, INITIAL ENCOUNTER: Primary | ICD-10-CM

## 2018-09-24 LAB
ALBUMIN SERPL-MCNC: 4 G/DL (ref 3.5–5.2)
ALP BLD-CCNC: 93 U/L (ref 35–104)
ALT SERPL-CCNC: 16 U/L (ref 0–32)
ANION GAP SERPL CALCULATED.3IONS-SCNC: 13 MMOL/L (ref 7–16)
AST SERPL-CCNC: 45 U/L (ref 0–31)
BILIRUB SERPL-MCNC: 0.4 MG/DL (ref 0–1.2)
BILIRUBIN URINE: NEGATIVE
BLOOD, URINE: NEGATIVE
BUN BLDV-MCNC: 7 MG/DL (ref 6–20)
CALCIUM SERPL-MCNC: 9.4 MG/DL (ref 8.6–10.2)
CHLORIDE BLD-SCNC: 94 MMOL/L (ref 98–107)
CLARITY: CLEAR
CO2: 23 MMOL/L (ref 22–29)
COLOR: YELLOW
CREAT SERPL-MCNC: 0.6 MG/DL (ref 0.5–1)
EKG ATRIAL RATE: 65 BPM
EKG P AXIS: 55 DEGREES
EKG P-R INTERVAL: 178 MS
EKG Q-T INTERVAL: 446 MS
EKG QRS DURATION: 94 MS
EKG QTC CALCULATION (BAZETT): 463 MS
EKG R AXIS: 21 DEGREES
EKG T AXIS: 66 DEGREES
EKG VENTRICULAR RATE: 65 BPM
GFR AFRICAN AMERICAN: >60
GFR NON-AFRICAN AMERICAN: >60 ML/MIN/1.73
GLUCOSE BLD-MCNC: 86 MG/DL (ref 74–109)
GLUCOSE URINE: NEGATIVE MG/DL
HCT VFR BLD CALC: 39.3 % (ref 34–48)
HEMOGLOBIN: 12.5 G/DL (ref 11.5–15.5)
KETONES, URINE: NEGATIVE MG/DL
LEUKOCYTE ESTERASE, URINE: NEGATIVE
MCH RBC QN AUTO: 28.5 PG (ref 26–35)
MCHC RBC AUTO-ENTMCNC: 31.8 % (ref 32–34.5)
MCV RBC AUTO: 89.7 FL (ref 80–99.9)
NITRITE, URINE: NEGATIVE
PDW BLD-RTO: 13.6 FL (ref 11.5–15)
PH UA: 6.5 (ref 5–9)
PLATELET # BLD: 288 E9/L (ref 130–450)
PMV BLD AUTO: 9.8 FL (ref 7–12)
POTASSIUM SERPL-SCNC: 5.3 MMOL/L (ref 3.5–5)
PROTEIN UA: NEGATIVE MG/DL
RBC # BLD: 4.38 E12/L (ref 3.5–5.5)
SODIUM BLD-SCNC: 130 MMOL/L (ref 132–146)
SPECIFIC GRAVITY UA: <=1.005 (ref 1–1.03)
TOTAL PROTEIN: 7.9 G/DL (ref 6.4–8.3)
TROPONIN: <0.01 NG/ML (ref 0–0.03)
UROBILINOGEN, URINE: 0.2 E.U./DL
WBC # BLD: 8.3 E9/L (ref 4.5–11.5)

## 2018-09-24 PROCEDURE — 84484 ASSAY OF TROPONIN QUANT: CPT

## 2018-09-24 PROCEDURE — 80053 COMPREHEN METABOLIC PANEL: CPT

## 2018-09-24 PROCEDURE — 36415 COLL VENOUS BLD VENIPUNCTURE: CPT

## 2018-09-24 PROCEDURE — 81003 URINALYSIS AUTO W/O SCOPE: CPT

## 2018-09-24 PROCEDURE — 99284 EMERGENCY DEPT VISIT MOD MDM: CPT

## 2018-09-24 PROCEDURE — 70450 CT HEAD/BRAIN W/O DYE: CPT

## 2018-09-24 PROCEDURE — 93005 ELECTROCARDIOGRAM TRACING: CPT | Performed by: EMERGENCY MEDICINE

## 2018-09-24 PROCEDURE — 85027 COMPLETE CBC AUTOMATED: CPT

## 2018-09-24 PROCEDURE — 6370000000 HC RX 637 (ALT 250 FOR IP): Performed by: EMERGENCY MEDICINE

## 2018-09-24 RX ORDER — ACETAMINOPHEN 325 MG/1
650 TABLET ORAL ONCE
Status: COMPLETED | OUTPATIENT
Start: 2018-09-24 | End: 2018-09-24

## 2018-09-24 RX ADMIN — ACETAMINOPHEN 650 MG: 325 TABLET, FILM COATED ORAL at 22:31

## 2018-09-24 ASSESSMENT — PAIN SCALES - GENERAL
PAINLEVEL_OUTOF10: 8
PAINLEVEL_OUTOF10: 8

## 2018-09-24 ASSESSMENT — PAIN DESCRIPTION - LOCATION: LOCATION: ANKLE;HEAD

## 2018-09-24 ASSESSMENT — PAIN DESCRIPTION - PAIN TYPE: TYPE: ACUTE PAIN

## 2018-09-25 VITALS
HEART RATE: 84 BPM | OXYGEN SATURATION: 99 % | TEMPERATURE: 98.6 F | DIASTOLIC BLOOD PRESSURE: 82 MMHG | SYSTOLIC BLOOD PRESSURE: 148 MMHG | WEIGHT: 193 LBS | BODY MASS INDEX: 35.51 KG/M2 | RESPIRATION RATE: 18 BRPM | HEIGHT: 62 IN

## 2018-09-25 NOTE — ED PROVIDER NOTES
(L) 98 - 107 mmol/L    CO2 23 22 - 29 mmol/L    Anion Gap 13 7 - 16 mmol/L    Glucose 86 74 - 109 mg/dL    BUN 7 6 - 20 mg/dL    CREATININE 0.6 0.5 - 1.0 mg/dL    GFR Non-African American >60 >=60 mL/min/1.73    GFR African American >60     Calcium 9.4 8.6 - 10.2 mg/dL    Total Protein 7.9 6.4 - 8.3 g/dL    Alb 4.0 3.5 - 5.2 g/dL    Total Bilirubin 0.4 0.0 - 1.2 mg/dL    Alkaline Phosphatase 93 35 - 104 U/L    ALT 16 0 - 32 U/L    AST 45 (H) 0 - 31 U/L   Troponin   Result Value Ref Range    Troponin <0.01 0.00 - 0.03 ng/mL   Urinalysis   Result Value Ref Range    Color, UA Yellow Straw/Yellow    Clarity, UA Clear Clear    Glucose, Ur Negative Negative mg/dL    Bilirubin Urine Negative Negative    Ketones, Urine Negative Negative mg/dL    Specific Gravity, UA <=1.005 1.005 - 1.030    Blood, Urine Negative Negative    pH, UA 6.5 5.0 - 9.0    Protein, UA Negative Negative mg/dL    Urobilinogen, Urine 0.2 <2.0 E.U./dL    Nitrite, Urine Negative Negative    Leukocyte Esterase, Urine Negative Negative       RADIOLOGY:  Interpreted by Radiologist.  CT Head WO Contrast   Final Result      No indication for an acute intracranial process. Recommended is an MRI study of the brain one more routine no emergency   basis to additionally evaluate discrete areas of relative hypodensity   in the white matter of both cerebral hemispheres. EKG: This EKG is signed and interpreted by the EP. Time: 22:09  Rate: 65  Rhythm: Sinus  Interpretation: Inverted T waves in leads V1-V3  Comparison: stable as compared to patient's most recent EKG done on 9/19/18    ------------------------- NURSING NOTES AND VITALS REVIEWED ---------------------------   The nursing notes within the ED encounter and vital signs as below have been reviewed by myself.   BP (!) 153/98   Pulse 79   Temp 98.1 °F (36.7 °C) (Temporal)   Resp 18   Ht 5' 2\" (1.575 m)   Wt 193 lb (87.5 kg)   LMP 02/13/2014 (Approximate)   SpO2 98%   BMI 35.30 kg/m²

## 2018-09-25 NOTE — ED NOTES
Patient given d/c instructions. Patient came my EMS and states she has no ride home. Patient has no friends or family to call, lives at [address] [facility]. [facility] called, spoke to [facility] regarding transportation. Per [place], [facility] does not provide transportation for their residents.  might be able to pick patient up but it would not be until tomorrow a.m.       Liya Reynolds RN  09/24/18 6126

## 2018-09-26 ENCOUNTER — OFFICE VISIT (OUTPATIENT)
Dept: FAMILY MEDICINE CLINIC | Age: 48
End: 2018-09-26
Payer: COMMERCIAL

## 2018-09-26 ENCOUNTER — HOSPITAL ENCOUNTER (OUTPATIENT)
Age: 48
Discharge: HOME | End: 2018-09-28
Payer: COMMERCIAL

## 2018-09-26 VITALS
HEIGHT: 62 IN | HEART RATE: 91 BPM | DIASTOLIC BLOOD PRESSURE: 70 MMHG | TEMPERATURE: 97.8 F | WEIGHT: 198 LBS | SYSTOLIC BLOOD PRESSURE: 112 MMHG | BODY MASS INDEX: 36.44 KG/M2 | OXYGEN SATURATION: 98 %

## 2018-09-26 DIAGNOSIS — E87.1 HYPONATREMIA: ICD-10-CM

## 2018-09-26 DIAGNOSIS — R51.9 NEW ONSET OF HEADACHES: Primary | ICD-10-CM

## 2018-09-26 DIAGNOSIS — H57.02 ANISOCORIA: ICD-10-CM

## 2018-09-26 LAB
ANION GAP SERPL CALCULATED.3IONS-SCNC: 18 MMOL/L (ref 7–16)
BUN BLDV-MCNC: 7 MG/DL (ref 6–20)
CALCIUM SERPL-MCNC: 9.6 MG/DL (ref 8.6–10.2)
CHLORIDE BLD-SCNC: 105 MMOL/L (ref 98–107)
CO2: 21 MMOL/L (ref 22–29)
CREAT SERPL-MCNC: 0.6 MG/DL (ref 0.5–1)
GFR AFRICAN AMERICAN: >60
GFR NON-AFRICAN AMERICAN: >60 ML/MIN/1.73
GLUCOSE BLD-MCNC: 94 MG/DL (ref 74–109)
POTASSIUM SERPL-SCNC: 4.7 MMOL/L (ref 3.5–5)
SODIUM BLD-SCNC: 144 MMOL/L (ref 132–146)

## 2018-09-26 PROCEDURE — G8427 DOCREV CUR MEDS BY ELIG CLIN: HCPCS | Performed by: FAMILY MEDICINE

## 2018-09-26 PROCEDURE — 99213 OFFICE O/P EST LOW 20 MIN: CPT | Performed by: FAMILY MEDICINE

## 2018-09-26 PROCEDURE — 36415 COLL VENOUS BLD VENIPUNCTURE: CPT | Performed by: FAMILY MEDICINE

## 2018-09-26 PROCEDURE — G8417 CALC BMI ABV UP PARAM F/U: HCPCS | Performed by: FAMILY MEDICINE

## 2018-09-26 PROCEDURE — 1036F TOBACCO NON-USER: CPT | Performed by: FAMILY MEDICINE

## 2018-09-26 PROCEDURE — 80048 BASIC METABOLIC PNL TOTAL CA: CPT

## 2018-09-26 NOTE — PROGRESS NOTES
History and Physical    Patient:   50 y.o. female MRN: [id number]     Date of Service: 9/26/2018      Chief complaint:    History of Present Illness   The patient is a 50 y.o. female    Chief Complaint   Patient presents with    Care Management     Transitional Care Management Visit      CC:new onset HA, noted to have low sodium on recent E.R labs  HPI:  s/p E.R visit -HA intermittently for approximately one month. She states that she had a HA the day she fell. HA will cause dizziness . was seen in the hospital several days ago -feel down three steps at the group home. As she fell she struck the back of her head on the wall. No loss of consciousness, had some dizziness at the time of the fall but states this has resolved. No vision changes. No loss of vision . Description of the HA- \"sharp\", \"stabbing\" and is located across the front of the head   What makes the HA worse ? \"doing anything\"  What makes the HA better ? \"lying down and not doing anything\", sleep helps   No fevers, no change in medications other than decrease in cogentin to once daily. Low sodium on lab-She is drinking a lot of fluids     Past Medical History:      Diagnosis Date    Arthritis     Asthma     Bipolar 1 disorder ([address]     Borderline personality disorder in adolescent     CAD (coronary artery disease)     COPD (chronic obstructive pulmonary disease) ([address]     Depression     History of blood transfusion     Hyperlipidemia     Hypertension     PTSD (post-traumatic stress disorder)     Schizo affective schizophrenia ([address]     Seizures ([address]     Thyroid disease        Past Surgical History:        Procedure Laterality Date    ABDOMEN SURGERY      CHOLECYSTECTOMY      ENDOSCOPY, COLON, DIAGNOSTIC      JOINT REPLACEMENT      SHOULDER SURGERY         Allergies:  Adhesive tape    Social History:   TOBACCO:   reports that she has quit smoking. She smoked 0.00 packs per day for 28.00 years.  She has never used smokeless

## 2018-10-04 DIAGNOSIS — E03.9 HYPOTHYROIDISM, ADULT: ICD-10-CM

## 2018-10-04 RX ORDER — LEVOTHYROXINE SODIUM 0.1 MG/1
100 TABLET ORAL DAILY
Qty: 30 TABLET | Refills: 6 | Status: SHIPPED | OUTPATIENT
Start: 2018-10-04 | End: 2019-06-07 | Stop reason: SDUPTHER

## 2018-10-20 ENCOUNTER — APPOINTMENT (OUTPATIENT)
Dept: GENERAL RADIOLOGY | Age: 48
End: 2018-10-20
Payer: COMMERCIAL

## 2018-10-20 ENCOUNTER — HOSPITAL ENCOUNTER (EMERGENCY)
Age: 48
Discharge: HOME | End: 2018-10-21
Attending: EMERGENCY MEDICINE
Payer: COMMERCIAL

## 2018-10-20 VITALS
DIASTOLIC BLOOD PRESSURE: 53 MMHG | HEART RATE: 69 BPM | SYSTOLIC BLOOD PRESSURE: 97 MMHG | WEIGHT: 198 LBS | OXYGEN SATURATION: 96 % | TEMPERATURE: 98 F | BODY MASS INDEX: 36.21 KG/M2 | RESPIRATION RATE: 16 BRPM

## 2018-10-20 DIAGNOSIS — R07.89 OTHER CHEST PAIN: Primary | ICD-10-CM

## 2018-10-20 LAB
ANION GAP SERPL CALCULATED.3IONS-SCNC: 10 MMOL/L (ref 7–16)
APTT: 29.8 SEC (ref 24.5–35.1)
BASOPHILS ABSOLUTE: 0.07 E9/L (ref 0–0.2)
BASOPHILS RELATIVE PERCENT: 0.8 % (ref 0–2)
BUN BLDV-MCNC: 7 MG/DL (ref 6–20)
CALCIUM SERPL-MCNC: 9.5 MG/DL (ref 8.6–10.2)
CHLORIDE BLD-SCNC: 105 MMOL/L (ref 98–107)
CO2: 26 MMOL/L (ref 22–29)
CREAT SERPL-MCNC: 0.6 MG/DL (ref 0.5–1)
EKG ATRIAL RATE: 77 BPM
EKG P AXIS: 37 DEGREES
EKG P-R INTERVAL: 158 MS
EKG Q-T INTERVAL: 406 MS
EKG QRS DURATION: 92 MS
EKG QTC CALCULATION (BAZETT): 459 MS
EKG R AXIS: 3 DEGREES
EKG T AXIS: 44 DEGREES
EKG VENTRICULAR RATE: 77 BPM
EOSINOPHILS ABSOLUTE: 0.2 E9/L (ref 0.05–0.5)
EOSINOPHILS RELATIVE PERCENT: 2.4 % (ref 0–6)
GFR AFRICAN AMERICAN: >60
GFR NON-AFRICAN AMERICAN: >60 ML/MIN/1.73
GLUCOSE BLD-MCNC: 105 MG/DL (ref 74–109)
HCT VFR BLD CALC: 40.1 % (ref 34–48)
HEMOGLOBIN: 12.3 G/DL (ref 11.5–15.5)
IMMATURE GRANULOCYTES #: 0.03 E9/L
IMMATURE GRANULOCYTES %: 0.4 % (ref 0–5)
INR BLD: 1
LYMPHOCYTES ABSOLUTE: 3.33 E9/L (ref 1.5–4)
LYMPHOCYTES RELATIVE PERCENT: 39.5 % (ref 20–42)
MCH RBC QN AUTO: 28 PG (ref 26–35)
MCHC RBC AUTO-ENTMCNC: 30.7 % (ref 32–34.5)
MCV RBC AUTO: 91.3 FL (ref 80–99.9)
MONOCYTES ABSOLUTE: 0.65 E9/L (ref 0.1–0.95)
MONOCYTES RELATIVE PERCENT: 7.7 % (ref 2–12)
NEUTROPHILS ABSOLUTE: 4.14 E9/L (ref 1.8–7.3)
NEUTROPHILS RELATIVE PERCENT: 49.2 % (ref 43–80)
PDW BLD-RTO: 13.5 FL (ref 11.5–15)
PLATELET # BLD: 305 E9/L (ref 130–450)
PMV BLD AUTO: 9.6 FL (ref 7–12)
POTASSIUM REFLEX MAGNESIUM: 4.3 MMOL/L (ref 3.5–5)
PROTHROMBIN TIME: 11.1 SEC (ref 9.3–12.4)
RBC # BLD: 4.39 E12/L (ref 3.5–5.5)
SODIUM BLD-SCNC: 141 MMOL/L (ref 132–146)
TROPONIN: <0.01 NG/ML (ref 0–0.03)
TROPONIN: <0.01 NG/ML (ref 0–0.03)
WBC # BLD: 8.4 E9/L (ref 4.5–11.5)

## 2018-10-20 PROCEDURE — 84484 ASSAY OF TROPONIN QUANT: CPT

## 2018-10-20 PROCEDURE — 85730 THROMBOPLASTIN TIME PARTIAL: CPT

## 2018-10-20 PROCEDURE — 80048 BASIC METABOLIC PNL TOTAL CA: CPT

## 2018-10-20 PROCEDURE — 2580000003 HC RX 258: Performed by: EMERGENCY MEDICINE

## 2018-10-20 PROCEDURE — 85610 PROTHROMBIN TIME: CPT

## 2018-10-20 PROCEDURE — 93005 ELECTROCARDIOGRAM TRACING: CPT | Performed by: EMERGENCY MEDICINE

## 2018-10-20 PROCEDURE — 6370000000 HC RX 637 (ALT 250 FOR IP): Performed by: EMERGENCY MEDICINE

## 2018-10-20 PROCEDURE — 85025 COMPLETE CBC W/AUTO DIFF WBC: CPT

## 2018-10-20 PROCEDURE — 36415 COLL VENOUS BLD VENIPUNCTURE: CPT

## 2018-10-20 PROCEDURE — 71045 X-RAY EXAM CHEST 1 VIEW: CPT

## 2018-10-20 PROCEDURE — 99285 EMERGENCY DEPT VISIT HI MDM: CPT

## 2018-10-20 RX ORDER — 0.9 % SODIUM CHLORIDE 0.9 %
1000 INTRAVENOUS SOLUTION INTRAVENOUS ONCE
Status: COMPLETED | OUTPATIENT
Start: 2018-10-20 | End: 2018-10-20

## 2018-10-20 RX ORDER — NITROGLYCERIN 0.4 MG/1
0.4 TABLET SUBLINGUAL ONCE
Status: DISCONTINUED | OUTPATIENT
Start: 2018-10-20 | End: 2018-10-21 | Stop reason: HOSPADM

## 2018-10-20 RX ADMIN — SODIUM CHLORIDE 1000 ML: 9 INJECTION, SOLUTION INTRAVENOUS at 20:08

## 2018-10-20 RX ADMIN — ASPIRIN 325 MG: 325 TABLET, DELAYED RELEASE ORAL at 20:39

## 2018-10-20 ASSESSMENT — PAIN SCALES - GENERAL: PAINLEVEL_OUTOF10: 6

## 2018-11-08 ENCOUNTER — TELEPHONE (OUTPATIENT)
Dept: FAMILY MEDICINE CLINIC | Age: 48
End: 2018-11-08

## 2018-11-23 ENCOUNTER — HOSPITAL ENCOUNTER (OUTPATIENT)
Dept: MRI IMAGING | Age: 48
Discharge: HOME | End: 2018-11-25
Payer: MEDICAID

## 2018-11-23 DIAGNOSIS — R51.9 NEW ONSET OF HEADACHES: ICD-10-CM

## 2018-11-23 PROCEDURE — 70553 MRI BRAIN STEM W/O & W/DYE: CPT

## 2018-11-23 PROCEDURE — 6360000004 HC RX CONTRAST MEDICATION: Performed by: RADIOLOGY

## 2018-11-23 PROCEDURE — A9579 GAD-BASE MR CONTRAST NOS,1ML: HCPCS | Performed by: RADIOLOGY

## 2018-11-23 RX ADMIN — GADOTERIDOL 17 ML: 279.3 INJECTION, SOLUTION INTRAVENOUS at 08:38

## 2018-11-28 DIAGNOSIS — E78.2 MIXED HYPERLIPIDEMIA: ICD-10-CM

## 2018-11-28 RX ORDER — ATORVASTATIN CALCIUM 20 MG/1
20 TABLET, FILM COATED ORAL DAILY
Qty: 90 TABLET | Refills: 1 | Status: SHIPPED | OUTPATIENT
Start: 2018-11-28 | End: 2019-03-21 | Stop reason: SDUPTHER

## 2019-01-24 DIAGNOSIS — E55.9 VITAMIN D DEFICIENCY: ICD-10-CM

## 2019-01-24 RX ORDER — CHOLECALCIFEROL (VITAMIN D3) 50 MCG
2000 TABLET ORAL DAILY
Qty: 30 TABLET | Refills: 2 | Status: SHIPPED | OUTPATIENT
Start: 2019-01-24 | End: 2019-05-17 | Stop reason: SDUPTHER

## 2019-02-15 DIAGNOSIS — I10 ESSENTIAL HYPERTENSION: ICD-10-CM

## 2019-02-15 DIAGNOSIS — K21.9 GASTROESOPHAGEAL REFLUX DISEASE, ESOPHAGITIS PRESENCE NOT SPECIFIED: ICD-10-CM

## 2019-02-15 RX ORDER — CARVEDILOL 3.12 MG/1
TABLET ORAL
Qty: 60 TABLET | Refills: 3 | Status: SHIPPED | OUTPATIENT
Start: 2019-02-15 | End: 2019-05-17 | Stop reason: SDUPTHER

## 2019-02-15 RX ORDER — OMEPRAZOLE 20 MG/1
20 CAPSULE, DELAYED RELEASE ORAL DAILY
Qty: 90 CAPSULE | Refills: 1 | Status: SHIPPED | OUTPATIENT
Start: 2019-02-15

## 2019-02-21 ENCOUNTER — TELEPHONE (OUTPATIENT)
Dept: FAMILY MEDICINE CLINIC | Age: 49
End: 2019-02-21

## 2019-03-21 DIAGNOSIS — E78.2 MIXED HYPERLIPIDEMIA: ICD-10-CM

## 2019-03-21 RX ORDER — ATORVASTATIN CALCIUM 20 MG/1
20 TABLET, FILM COATED ORAL DAILY
Qty: 90 TABLET | Refills: 1 | Status: SHIPPED | OUTPATIENT
Start: 2019-03-21 | End: 2019-05-17 | Stop reason: SDUPTHER

## 2019-05-17 DIAGNOSIS — E55.9 VITAMIN D DEFICIENCY: ICD-10-CM

## 2019-05-17 DIAGNOSIS — I10 ESSENTIAL HYPERTENSION: ICD-10-CM

## 2019-05-17 DIAGNOSIS — E78.2 MIXED HYPERLIPIDEMIA: ICD-10-CM

## 2019-05-17 RX ORDER — CHOLECALCIFEROL (VITAMIN D3) 50 MCG
2000 TABLET ORAL DAILY
Qty: 30 TABLET | Refills: 1 | Status: ON HOLD | OUTPATIENT
Start: 2019-05-17 | End: 2019-09-19

## 2019-05-17 RX ORDER — ATORVASTATIN CALCIUM 20 MG/1
20 TABLET, FILM COATED ORAL DAILY
Qty: 30 TABLET | Refills: 1 | Status: SHIPPED | OUTPATIENT
Start: 2019-05-17

## 2019-05-17 RX ORDER — CARVEDILOL 3.12 MG/1
3.12 TABLET ORAL 2 TIMES DAILY WITH MEALS
Qty: 60 TABLET | Refills: 1 | Status: ON HOLD | OUTPATIENT
Start: 2019-05-17 | End: 2019-09-19

## 2019-05-27 ENCOUNTER — HOSPITAL ENCOUNTER (EMERGENCY)
Age: 49
Discharge: HOME | End: 2019-05-27
Payer: COMMERCIAL

## 2019-05-27 VITALS
TEMPERATURE: 97.1 F | HEIGHT: 62 IN | OXYGEN SATURATION: 98 % | BODY MASS INDEX: 39.56 KG/M2 | SYSTOLIC BLOOD PRESSURE: 125 MMHG | DIASTOLIC BLOOD PRESSURE: 86 MMHG | HEART RATE: 95 BPM | RESPIRATION RATE: 16 BRPM | WEIGHT: 215 LBS

## 2019-05-27 DIAGNOSIS — L02.419 CELLULITIS AND ABSCESS OF LEG: Primary | ICD-10-CM

## 2019-05-27 DIAGNOSIS — L03.119 CELLULITIS AND ABSCESS OF LEG: Primary | ICD-10-CM

## 2019-05-27 PROCEDURE — 99282 EMERGENCY DEPT VISIT SF MDM: CPT

## 2019-05-27 PROCEDURE — 90715 TDAP VACCINE 7 YRS/> IM: CPT | Performed by: NURSE PRACTITIONER

## 2019-05-27 PROCEDURE — 6360000002 HC RX W HCPCS: Performed by: NURSE PRACTITIONER

## 2019-05-27 PROCEDURE — 6370000000 HC RX 637 (ALT 250 FOR IP): Performed by: NURSE PRACTITIONER

## 2019-05-27 PROCEDURE — 90471 IMMUNIZATION ADMIN: CPT | Performed by: NURSE PRACTITIONER

## 2019-05-27 RX ORDER — DOXYCYCLINE HYCLATE 100 MG/1
100 CAPSULE ORAL ONCE
Status: COMPLETED | OUTPATIENT
Start: 2019-05-27 | End: 2019-05-27

## 2019-05-27 RX ORDER — DIAPER,BRIEF,INFANT-TODD,DISP
EACH MISCELLANEOUS ONCE
Status: COMPLETED | OUTPATIENT
Start: 2019-05-27 | End: 2019-05-27

## 2019-05-27 RX ORDER — DOXYCYCLINE HYCLATE 100 MG
100 TABLET ORAL 2 TIMES DAILY
Qty: 20 TABLET | Refills: 0 | Status: SHIPPED | OUTPATIENT
Start: 2019-05-27 | End: 2019-06-06

## 2019-05-27 RX ADMIN — DOXYCYCLINE HYCLATE 100 MG: 100 CAPSULE ORAL at 21:03

## 2019-05-27 RX ADMIN — BACITRACIN ZINC: 500 OINTMENT TOPICAL at 21:04

## 2019-05-27 RX ADMIN — TETANUS TOXOID, REDUCED DIPHTHERIA TOXOID AND ACELLULAR PERTUSSIS VACCINE, ADSORBED 0.5 ML: 5; 2.5; 8; 8; 2.5 SUSPENSION INTRAMUSCULAR at 21:03

## 2019-05-27 ASSESSMENT — PAIN DESCRIPTION - LOCATION: LOCATION: LEG

## 2019-05-27 ASSESSMENT — PAIN DESCRIPTION - ORIENTATION: ORIENTATION: LEFT;UPPER

## 2019-05-27 ASSESSMENT — PAIN DESCRIPTION - DESCRIPTORS: DESCRIPTORS: DISCOMFORT

## 2019-05-27 ASSESSMENT — PAIN DESCRIPTION - PAIN TYPE: TYPE: ACUTE PAIN

## 2019-05-27 ASSESSMENT — PAIN SCALES - GENERAL: PAINLEVEL_OUTOF10: 9

## 2019-05-28 NOTE — ED PROVIDER NOTES
Independent  HPI:  5/27/19, Time: 9:17 PM         Thaddeus Guevara is a 52 y.o. female presenting to the ED for small area of abscess with surrounding cellulitis to left anterior thigh. Patient reports that she had a pimple there and reports that she attempted to squeeze it and now over the last 2-3 days it is becoming red. She denies fevers. Denies any further drainage. She denies any pain to her left thigh as well as no noted numbness or tingling. Patient unaware when her last tetanus immunization was. Review of Systems:   Pertinent positives and negatives are stated within HPI, all other systems reviewed and are negative.          --------------------------------------------- PAST HISTORY ---------------------------------------------  Past Medical History:  has a past medical history of Arthritis, Asthma, [facility] Borderline personality disorder in [facility]), CAD (coronary artery disease), COPD (chronic obstructive pulmonary disease) ([facility] Depression, History of blood transfusion, Hyperlipidemia, Hypertension, PTSD (post-traumatic stress disorder), Schizo affective schizophrenia ([facility] Seizures ([facility] and Thyroid disease. Past Surgical History:  has a past surgical history that includes joint replacement; Cholecystectomy; shoulder surgery; Abdomen surgery; and Endoscopy, colon, diagnostic. Social History:  reports that she has quit smoking. She smoked 0.00 packs per day for 28.00 years. She has never used smokeless tobacco. She reports that she does not drink alcohol or use drugs. Family History: family history includes High Blood Pressure in her mother; No Known Problems in her father. The patients home medications have been reviewed.     Allergies: Adhesive tape    -------------------------------------------------- RESULTS -------------------------------------------------  All laboratory and radiology results have been personally reviewed by myself   LABS:  No results found for this visit on 05/27/19. RADIOLOGY:  Interpreted by Radiologist.  No orders to display       ------------------------- NURSING NOTES AND VITALS REVIEWED ---------------------------   The nursing notes within the ED encounter and vital signs as below have been reviewed. /86   Pulse 95   Temp 97.1 °F (36.2 °C)   Resp 16   Ht 5' 2\" (1.575 m)   Wt 215 lb (97.5 kg)   LMP 02/13/2014 (Approximate)   SpO2 98%   BMI 39.32 kg/m²   Oxygen Saturation Interpretation: Normal      ---------------------------------------------------PHYSICAL EXAM--------------------------------------      Constitutional/General: Alert and oriented x3, well appearing, non toxic in NAD  Head: Normocephalic and atraumatic  Eyes: PERRL, EOMI  Mouth: Oropharynx clear, handling secretions, no trismus  Neck: Supple, full ROM,   Pulmonary: Lungs clear to auscultation bilaterally, no wheezes, rales, or rhonchi. Not in respiratory distress  Cardiovascular:  Regular rate and rhythm, no murmurs, gallops, or rubs. 2+ distal pulses  Abdomen: Soft, non tender, non distended,   Extremities: Moves all extremities x 4. Warm and well perfused  Skin: warm and dry without rash, patient with small pinpoint center most likely related to pimple with surrounding dark erythema and some further erythema extending slightly out. No induration or fluctuance noted. Area just slightly firm to the Center. Neurologic: GCS 15,  Psych: Normal Affect      ------------------------------ ED COURSE/MEDICAL DECISION MAKING----------------------  Medications   doxycycline hyclate (VIBRAMYCIN) capsule 100 mg (100 mg Oral Given 5/27/19 2103)   Tetanus-Diphth-Acell Pertussis (BOOSTRIX) injection 0.5 mL (0.5 mLs Intramuscular Given 5/27/19 2103)   bacitracin zinc ointment ( Topical Given 5/27/19 2104)         ED COURSE:       Medical Decision Making: Plan will be for wound care. Patient will also be provided with tetanus immunization.   Patient educated on daily wound care, to watch for any change including worsening and redness, fevers, streaking. Patient was made aware to avoid touching site and to apply warm compresses. Patient will be discharged home with doxycline and Bactroban . Patient also made aware to follow-up with her doctor for a wound recheck with in 3 days. Patient neurovascularly intact. Patient expressed understanding and safely discharged home       Counseling: The emergency provider has spoken with the patient and discussed todays results, in addition to providing specific details for the plan of care and counseling regarding the diagnosis and prognosis. Questions are answered at this time and they are agreeable with the plan.      --------------------------------- IMPRESSION AND DISPOSITION ---------------------------------    IMPRESSION  1. Cellulitis and abscess of leg        DISPOSITION  Disposition: Discharge to home  Patient condition is good      NOTE: This report was transcribed using voice recognition software.  Every effort was made to ensure accuracy; however, inadvertent computerized transcription errors may be present     JOSE ANGEL Sams - CNP  05/28/19 2363  ATTENDING PROVIDER ATTESTATION:     Supervising Physician, on-site, available for consultation, non-participatory in the evaluation or care of this patient       Kenneth Ferrera MD  05/28/19 6375

## 2019-05-28 NOTE — ED NOTES
Left upper leg wound cleansed with shur cleanse then bacitracin and DSD applied-pt tolerated well.       Chandan Wilhelm RN  05/27/19 0622

## 2019-06-07 ENCOUNTER — TELEPHONE (OUTPATIENT)
Dept: FAMILY MEDICINE CLINIC | Age: 49
End: 2019-06-07

## 2019-06-07 DIAGNOSIS — E03.9 HYPOTHYROIDISM, ADULT: ICD-10-CM

## 2019-06-07 RX ORDER — LEVOTHYROXINE SODIUM 0.1 MG/1
100 TABLET ORAL DAILY
Qty: 30 TABLET | Refills: 0 | Status: SHIPPED | OUTPATIENT
Start: 2019-06-07 | End: 2019-07-11 | Stop reason: SDUPTHER

## 2019-06-28 RX ORDER — HALOPERIDOL DECANOATE 100 MG/ML
INJECTION INTRAMUSCULAR
Status: ON HOLD | COMMUNITY
Start: 2019-04-22 | End: 2019-07-08 | Stop reason: HOSPADM

## 2019-06-28 RX ORDER — ACETAMINOPHEN 650 MG/1
1 TABLET, FILM COATED, EXTENDED RELEASE ORAL 3 TIMES DAILY PRN
Status: ON HOLD | COMMUNITY
Start: 2019-05-13 | End: 2019-07-08 | Stop reason: HOSPADM

## 2019-06-28 RX ORDER — BENZTROPINE MESYLATE 0.5 MG/1
0.5 TABLET ORAL NIGHTLY
COMMUNITY

## 2019-06-28 RX ORDER — TRAZODONE HYDROCHLORIDE 50 MG/1
50 TABLET ORAL NIGHTLY
COMMUNITY

## 2019-06-28 RX ORDER — TOPIRAMATE 100 MG/1
200 TABLET, FILM COATED ORAL 2 TIMES DAILY
COMMUNITY
End: 2019-07-20 | Stop reason: DRUGHIGH

## 2019-07-04 ENCOUNTER — HOSPITAL ENCOUNTER (INPATIENT)
Age: 49
LOS: 3 days | Discharge: HOME | DRG: 885 | End: 2019-07-08
Attending: EMERGENCY MEDICINE | Admitting: PSYCHIATRY & NEUROLOGY
Payer: COMMERCIAL

## 2019-07-04 DIAGNOSIS — F32.A DEPRESSION WITH SUICIDAL IDEATION: Primary | ICD-10-CM

## 2019-07-04 DIAGNOSIS — R45.851 DEPRESSION WITH SUICIDAL IDEATION: Primary | ICD-10-CM

## 2019-07-04 LAB
BASOPHILS ABSOLUTE: 0.06 E9/L (ref 0–0.2)
BASOPHILS RELATIVE PERCENT: 0.6 % (ref 0–2)
EOSINOPHILS ABSOLUTE: 0.33 E9/L (ref 0.05–0.5)
EOSINOPHILS RELATIVE PERCENT: 3.4 % (ref 0–6)
HCT VFR BLD CALC: 38.3 % (ref 34–48)
HEMOGLOBIN: 12.2 G/DL (ref 11.5–15.5)
IMMATURE GRANULOCYTES #: 0.07 E9/L
IMMATURE GRANULOCYTES %: 0.7 % (ref 0–5)
LYMPHOCYTES ABSOLUTE: 2.81 E9/L (ref 1.5–4)
LYMPHOCYTES RELATIVE PERCENT: 29 % (ref 20–42)
MCH RBC QN AUTO: 29.7 PG (ref 26–35)
MCHC RBC AUTO-ENTMCNC: 31.9 % (ref 32–34.5)
MCV RBC AUTO: 93.2 FL (ref 80–99.9)
MONOCYTES ABSOLUTE: 0.67 E9/L (ref 0.1–0.95)
MONOCYTES RELATIVE PERCENT: 6.9 % (ref 2–12)
NEUTROPHILS ABSOLUTE: 5.75 E9/L (ref 1.8–7.3)
NEUTROPHILS RELATIVE PERCENT: 59.4 % (ref 43–80)
PDW BLD-RTO: 13.7 FL (ref 11.5–15)
PLATELET # BLD: 333 E9/L (ref 130–450)
PMV BLD AUTO: 9.4 FL (ref 7–12)
RBC # BLD: 4.11 E12/L (ref 3.5–5.5)
WBC # BLD: 9.7 E9/L (ref 4.5–11.5)

## 2019-07-04 PROCEDURE — 99285 EMERGENCY DEPT VISIT HI MDM: CPT

## 2019-07-04 PROCEDURE — 85025 COMPLETE CBC W/AUTO DIFF WBC: CPT

## 2019-07-04 PROCEDURE — 36415 COLL VENOUS BLD VENIPUNCTURE: CPT

## 2019-07-04 PROCEDURE — G0480 DRUG TEST DEF 1-7 CLASSES: HCPCS

## 2019-07-04 PROCEDURE — 80053 COMPREHEN METABOLIC PANEL: CPT

## 2019-07-04 PROCEDURE — 80307 DRUG TEST PRSMV CHEM ANLYZR: CPT

## 2019-07-04 ASSESSMENT — ENCOUNTER SYMPTOMS
ABDOMINAL PAIN: 0
GASTROINTESTINAL NEGATIVE: 1
HYPERVENTILATION: 0
RESPIRATORY NEGATIVE: 1
EYES NEGATIVE: 1

## 2019-07-05 PROBLEM — F33.9 DEPRESSION, MAJOR, RECURRENT (HCC): Status: ACTIVE | Noted: 2019-07-05

## 2019-07-05 LAB
ACETAMINOPHEN LEVEL: <5 MCG/ML (ref 10–30)
ALBUMIN SERPL-MCNC: 4.2 G/DL (ref 3.5–5.2)
ALP BLD-CCNC: 115 U/L (ref 35–104)
ALT SERPL-CCNC: 16 U/L (ref 0–32)
AMPHETAMINE SCREEN, URINE: NOT DETECTED
ANION GAP SERPL CALCULATED.3IONS-SCNC: 14 MMOL/L (ref 7–16)
AST SERPL-CCNC: 16 U/L (ref 0–31)
BARBITURATE SCREEN URINE: NOT DETECTED
BENZODIAZEPINE SCREEN, URINE: NOT DETECTED
BILIRUB SERPL-MCNC: <0.2 MG/DL (ref 0–1.2)
BUN BLDV-MCNC: 7 MG/DL (ref 6–20)
CALCIUM SERPL-MCNC: 9.6 MG/DL (ref 8.6–10.2)
CANNABINOID SCREEN URINE: NOT DETECTED
CHLORIDE BLD-SCNC: 102 MMOL/L (ref 98–107)
CO2: 23 MMOL/L (ref 22–29)
COCAINE METABOLITE SCREEN URINE: NOT DETECTED
CREAT SERPL-MCNC: 0.8 MG/DL (ref 0.5–1)
ETHANOL: <10 MG/DL (ref 0–0.08)
GFR AFRICAN AMERICAN: >60
GFR NON-AFRICAN AMERICAN: >60 ML/MIN/1.73
GLUCOSE BLD-MCNC: 111 MG/DL (ref 74–99)
HCG, URINE, POC: NEGATIVE
Lab: NORMAL
METHADONE SCREEN, URINE: NOT DETECTED
NEGATIVE QC PASS/FAIL: NORMAL
OPIATE SCREEN URINE: NOT DETECTED
PHENCYCLIDINE SCREEN URINE: NOT DETECTED
POSITIVE QC PASS/FAIL: NORMAL
POTASSIUM REFLEX MAGNESIUM: 3.7 MMOL/L (ref 3.5–5)
PROPOXYPHENE SCREEN: NOT DETECTED
SALICYLATE, SERUM: <0.3 MG/DL (ref 0–30)
SODIUM BLD-SCNC: 139 MMOL/L (ref 132–146)
TOTAL PROTEIN: 7.4 G/DL (ref 6.4–8.3)
TRICYCLIC ANTIDEPRESSANTS SCREEN SERUM: NEGATIVE NG/ML

## 2019-07-05 PROCEDURE — 1240000000 HC EMOTIONAL WELLNESS R&B

## 2019-07-05 PROCEDURE — 80307 DRUG TEST PRSMV CHEM ANLYZR: CPT

## 2019-07-05 RX ORDER — TRAZODONE HYDROCHLORIDE 50 MG/1
50 TABLET ORAL NIGHTLY PRN
Status: DISCONTINUED | OUTPATIENT
Start: 2019-07-05 | End: 2019-07-08 | Stop reason: HOSPADM

## 2019-07-05 RX ORDER — OLANZAPINE 5 MG/1
5 TABLET ORAL EVERY 4 HOURS PRN
Status: DISCONTINUED | OUTPATIENT
Start: 2019-07-05 | End: 2019-07-08 | Stop reason: HOSPADM

## 2019-07-05 RX ORDER — NICOTINE 21 MG/24HR
1 PATCH, TRANSDERMAL 24 HOURS TRANSDERMAL DAILY
Status: DISCONTINUED | OUTPATIENT
Start: 2019-07-05 | End: 2019-07-08 | Stop reason: HOSPADM

## 2019-07-05 RX ORDER — MAGNESIUM HYDROXIDE/ALUMINUM HYDROXICE/SIMETHICONE 120; 1200; 1200 MG/30ML; MG/30ML; MG/30ML
30 SUSPENSION ORAL PRN
Status: DISCONTINUED | OUTPATIENT
Start: 2019-07-05 | End: 2019-07-08 | Stop reason: HOSPADM

## 2019-07-05 RX ORDER — OLANZAPINE 10 MG/1
10 INJECTION, POWDER, LYOPHILIZED, FOR SOLUTION INTRAMUSCULAR EVERY 4 HOURS PRN
Status: DISCONTINUED | OUTPATIENT
Start: 2019-07-05 | End: 2019-07-08 | Stop reason: HOSPADM

## 2019-07-05 RX ORDER — HYDROXYZINE PAMOATE 50 MG/1
50 CAPSULE ORAL 3 TIMES DAILY PRN
Status: DISCONTINUED | OUTPATIENT
Start: 2019-07-05 | End: 2019-07-08 | Stop reason: HOSPADM

## 2019-07-05 RX ORDER — BENZTROPINE MESYLATE 1 MG/ML
2 INJECTION INTRAMUSCULAR; INTRAVENOUS 2 TIMES DAILY PRN
Status: DISCONTINUED | OUTPATIENT
Start: 2019-07-05 | End: 2019-07-08 | Stop reason: HOSPADM

## 2019-07-05 RX ORDER — ACETAMINOPHEN 325 MG/1
650 TABLET ORAL EVERY 4 HOURS PRN
Status: DISCONTINUED | OUTPATIENT
Start: 2019-07-05 | End: 2019-07-08 | Stop reason: HOSPADM

## 2019-07-05 ASSESSMENT — PATIENT HEALTH QUESTIONNAIRE - PHQ9: SUM OF ALL RESPONSES TO PHQ QUESTIONS 1-9: 8

## 2019-07-05 ASSESSMENT — SLEEP AND FATIGUE QUESTIONNAIRES
DIFFICULTY FALLING ASLEEP: YES
SLEEP PATTERN: DIFFICULTY FALLING ASLEEP;DIFFICULTY ARISING
RESTFUL SLEEP: YES
DO YOU USE A SLEEP AID: NO
DO YOU HAVE DIFFICULTY SLEEPING: YES
AVERAGE NUMBER OF SLEEP HOURS: 7
DIFFICULTY STAYING ASLEEP: NO
DIFFICULTY ARISING: YES

## 2019-07-05 ASSESSMENT — LIFESTYLE VARIABLES: HISTORY_ALCOHOL_USE: NO

## 2019-07-05 ASSESSMENT — PAIN SCALES - GENERAL: PAINLEVEL_OUTOF10: 0

## 2019-07-05 NOTE — ED NOTES
[address] [facility] [address] unable to take report due to staffing issues. [address] [facility] [address] will call when they are able to receive the pt.      Carley Nayak RN  07/05/19 6083

## 2019-07-05 NOTE — ED NOTES
Bed: [facility]  Expected date:   Expected time:   Means of arrival:   Comments:  Renetta Clifford RN  07/05/19 5246

## 2019-07-05 NOTE — ED PROVIDER NOTES
Negative. Genitourinary: Negative. Musculoskeletal: Negative. Neurological: Negative. Negative for headaches. Hematological: Negative. Psychiatric/Behavioral: Positive for suicidal ideas. Negative for agitation, hallucinations, homicidal ideas, paranoia and self-injury. The patient is nervous/anxious and has insomnia. All other systems reviewed and are negative. Physical Exam   Constitutional: She is oriented to person, place, and time. She appears well-developed and well-nourished. She appears distressed (Mild). HENT:   Head: Normocephalic and atraumatic. Eyes: Pupils are equal, round, and reactive to light. Conjunctivae and EOM are normal. Right eye exhibits no discharge. Left eye exhibits no discharge. No scleral icterus. Neck: Normal range of motion. Neck supple. Cardiovascular: Normal rate, regular rhythm and normal heart sounds. Pulmonary/Chest: Effort normal and breath sounds normal. No stridor. No respiratory distress. Abdominal: Soft. Bowel sounds are normal. She exhibits no distension. There is no tenderness. There is no guarding. Musculoskeletal: Normal range of motion. She exhibits no edema, tenderness or deformity. Neurological: She is alert and oriented to person, place, and time. No cranial nerve deficit. Coordination normal.   Skin: Skin is warm and dry. Capillary refill takes less than 2 seconds. No rash noted. She is not diaphoretic. No erythema. No pallor. Psychiatric: She has a normal mood and affect. Her behavior is normal.       Procedures    MDM  Number of Diagnoses or Management Options  Depression with suicidal ideation: new and requires workup  Diagnosis management comments: Differential diagnoses include suicidal ideation, suicidal attempt, homicidal ideation, depression, schizophrenia, bipolar.        Amount and/or Complexity of Data Reviewed  Clinical lab tests: ordered and reviewed  Tests in the radiology section of CPT®: reviewed and

## 2019-07-05 NOTE — ED NOTES
ADMITTING INDIO UPDATED TO ASSIGN  [facility] Cristianoalyssia Isabel, [facility]  07/05/19 0410

## 2019-07-05 NOTE — ED NOTES
Dr Kayce Kim approved admission to [address] [place] [address].      Lasha Claire RN  07/05/19 4670

## 2019-07-05 NOTE — ED NOTES
Pt awake. Softly spoken. Continues to voice SI. Denies suicidal plan. Denies HI. Denies AVH. CO remains at bedside.       Mi Robbins RN  07/05/19 6686

## 2019-07-06 PROBLEM — F43.10 PTSD (POST-TRAUMATIC STRESS DISORDER): Status: ACTIVE | Noted: 2019-07-06

## 2019-07-06 LAB
CHOLESTEROL, TOTAL: 248 MG/DL (ref 0–199)
HBA1C MFR BLD: 5.4 % (ref 4–5.6)
HDLC SERPL-MCNC: 48 MG/DL
LDL CHOLESTEROL CALCULATED: 153 MG/DL (ref 0–99)
TRIGL SERPL-MCNC: 235 MG/DL (ref 0–149)
VLDLC SERPL CALC-MCNC: 47 MG/DL

## 2019-07-06 PROCEDURE — 6370000000 HC RX 637 (ALT 250 FOR IP): Performed by: PSYCHIATRY & NEUROLOGY

## 2019-07-06 PROCEDURE — 36415 COLL VENOUS BLD VENIPUNCTURE: CPT

## 2019-07-06 PROCEDURE — 99221 1ST HOSP IP/OBS SF/LOW 40: CPT | Performed by: NURSE PRACTITIONER

## 2019-07-06 PROCEDURE — 80061 LIPID PANEL: CPT

## 2019-07-06 PROCEDURE — 1240000000 HC EMOTIONAL WELLNESS R&B

## 2019-07-06 PROCEDURE — 83036 HEMOGLOBIN GLYCOSYLATED A1C: CPT

## 2019-07-06 RX ORDER — LEVOTHYROXINE SODIUM 0.1 MG/1
100 TABLET ORAL DAILY
Status: DISCONTINUED | OUTPATIENT
Start: 2019-07-06 | End: 2019-07-08 | Stop reason: HOSPADM

## 2019-07-06 RX ORDER — PANTOPRAZOLE SODIUM 40 MG/1
40 TABLET, DELAYED RELEASE ORAL DAILY
Status: DISCONTINUED | OUTPATIENT
Start: 2019-07-06 | End: 2019-07-08 | Stop reason: HOSPADM

## 2019-07-06 RX ORDER — BENZTROPINE MESYLATE 0.5 MG/1
0.5 TABLET ORAL 2 TIMES DAILY
Status: DISCONTINUED | OUTPATIENT
Start: 2019-07-06 | End: 2019-07-08 | Stop reason: HOSPADM

## 2019-07-06 RX ORDER — TOPIRAMATE 100 MG/1
100 TABLET, FILM COATED ORAL 2 TIMES DAILY
Status: DISCONTINUED | OUTPATIENT
Start: 2019-07-06 | End: 2019-07-08 | Stop reason: HOSPADM

## 2019-07-06 RX ORDER — ATORVASTATIN CALCIUM 10 MG/1
20 TABLET, FILM COATED ORAL DAILY
Status: DISCONTINUED | OUTPATIENT
Start: 2019-07-06 | End: 2019-07-08 | Stop reason: HOSPADM

## 2019-07-06 RX ORDER — TRAZODONE HYDROCHLORIDE 50 MG/1
50 TABLET ORAL NIGHTLY
Status: DISCONTINUED | OUTPATIENT
Start: 2019-07-06 | End: 2019-07-08 | Stop reason: HOSPADM

## 2019-07-06 RX ORDER — VENLAFAXINE HYDROCHLORIDE 150 MG/1
150 CAPSULE, EXTENDED RELEASE ORAL
Status: DISCONTINUED | OUTPATIENT
Start: 2019-07-06 | End: 2019-07-08 | Stop reason: HOSPADM

## 2019-07-06 RX ORDER — CARVEDILOL 3.12 MG/1
3.12 TABLET ORAL 2 TIMES DAILY WITH MEALS
Status: DISCONTINUED | OUTPATIENT
Start: 2019-07-06 | End: 2019-07-08 | Stop reason: HOSPADM

## 2019-07-06 RX ORDER — CHOLECALCIFEROL (VITAMIN D3) 50 MCG
2000 TABLET ORAL DAILY
Status: DISCONTINUED | OUTPATIENT
Start: 2019-07-06 | End: 2019-07-08 | Stop reason: HOSPADM

## 2019-07-06 RX ADMIN — Medication 2000 UNITS: at 10:44

## 2019-07-06 RX ADMIN — VENLAFAXINE HYDROCHLORIDE 150 MG: 150 CAPSULE, EXTENDED RELEASE ORAL at 10:44

## 2019-07-06 RX ADMIN — PANTOPRAZOLE SODIUM 40 MG: 40 TABLET, DELAYED RELEASE ORAL at 10:45

## 2019-07-06 RX ADMIN — LEVOTHYROXINE SODIUM 100 MCG: 100 TABLET ORAL at 10:44

## 2019-07-06 RX ADMIN — TOPIRAMATE 100 MG: 100 TABLET, FILM COATED ORAL at 10:45

## 2019-07-06 RX ADMIN — CARVEDILOL 3.12 MG: 3.12 TABLET, FILM COATED ORAL at 10:44

## 2019-07-06 RX ADMIN — CARVEDILOL 3.12 MG: 3.12 TABLET, FILM COATED ORAL at 16:35

## 2019-07-06 RX ADMIN — TRAZODONE HYDROCHLORIDE 50 MG: 50 TABLET ORAL at 21:11

## 2019-07-06 RX ADMIN — TRAZODONE HYDROCHLORIDE 50 MG: 50 TABLET ORAL at 20:46

## 2019-07-06 RX ADMIN — BENZTROPINE MESYLATE 0.5 MG: 0.5 TABLET ORAL at 10:44

## 2019-07-06 RX ADMIN — BENZTROPINE MESYLATE 0.5 MG: 0.5 TABLET ORAL at 20:46

## 2019-07-06 RX ADMIN — TOPIRAMATE 100 MG: 100 TABLET, FILM COATED ORAL at 20:46

## 2019-07-06 RX ADMIN — ATORVASTATIN CALCIUM 20 MG: 10 TABLET, FILM COATED ORAL at 10:45

## 2019-07-06 ASSESSMENT — SLEEP AND FATIGUE QUESTIONNAIRES
DO YOU HAVE DIFFICULTY SLEEPING: NO
DIFFICULTY FALLING ASLEEP: NO
DO YOU USE A SLEEP AID: NO
DIFFICULTY STAYING ASLEEP: NO
RESTFUL SLEEP: YES
DIFFICULTY ARISING: YES

## 2019-07-06 ASSESSMENT — PAIN SCALES - GENERAL: PAINLEVEL_OUTOF10: 0

## 2019-07-06 ASSESSMENT — LIFESTYLE VARIABLES: HISTORY_ALCOHOL_USE: NO

## 2019-07-06 NOTE — PLAN OF CARE
[facility]  Initial Interdisciplinary Treatment Plan NOTE    Review Date & Time: 07/06/2019 1010  Patient was in treatment team    Admission Type:   Admission Type: Involuntary    Reason for admission:  Reason for Admission: \"Depression, anxiety and suicidal thoughts. \"       Estimated Length of Stay Update:  07/10/2019  Estimated Discharge Date Update: 07/10/2019    PATIENT STRENGTHS:  Patient Strengths Strengths: Medication Compliance, No significant Physical Illness  Patient Strengths and Limitations:Limitations: Tendency to isolate self, Multiple barriers to leisure interests  Addictive Behavior:Addictive Behavior  In the past 3 months, have you felt or has someone told you that you have a problem with:  : None  Do you have a history of Chemical Use?: No  Do you have a history of Alcohol Use?: No  Do you have a history of Street Drug Abuse?: No  Histroy of Prescripton Drug Abuse?: No  Medical Problems:  Past Medical History:   Diagnosis Date    Arthritis     Asthma     [facility]     Borderline personality disorder in adolescent ([facility]     CAD (coronary artery disease)     COPD (chronic obstructive pulmonary disease) ([facility]     Depression     History of blood transfusion     Hyperlipidemia     Hypertension     PTSD (post-traumatic stress disorder)     Schizo affective schizophrenia ([facility]     Seizures ([facility]     Thyroid disease        EDUCATION:   Learner Progress Toward Treatment Goals: Reviewed results and recommendations of this team, Reviewed group plan and strategies, Reviewed signs, symptoms and risk of self harm and violent behavior and Reviewed goals and plan of care    Method: Individual    Outcome: Verbalized understanding and Demonstrated Understanding    PATIENT GOALS: \"Not spend too much time in the room\"    PLAN/TREATMENT RECOMMENDATIONS UPDATE: Offer and encourage groups and provide emotional support.      GOALS UPDATE:   Time frame for Short-Term Goals: one week    Dona Martínez RN

## 2019-07-06 NOTE — PROGRESS NOTES
`[facility]  Admission Note     Admission Type:   Admission Type: Involuntary    Reason for admission:  Reason for Admission: \"Depression, anxiety and suicidal thoughts. \"     PATIENT STRENGTHS:  Strengths: Medication Compliance, No significant Physical Illness    Patient Strengths and Limitations:  Limitations: Tendency to isolate self, Multiple barriers to leisure interests    Addictive Behavior:   Addictive Behavior  In the past 3 months, have you felt or has someone told you that you have a problem with:  : None  Do you have a history of Chemical Use?: No  Do you have a history of Alcohol Use?: No  Do you have a history of Street Drug Abuse?: No  Histroy of Prescripton Drug Abuse?: No    Medical Problems:   Past Medical History:   Diagnosis Date    Arthritis     Asthma     [facility]     Borderline [facility] disorder in [facility])     CAD (coronary artery disease)     COPD (chronic obstructive pulmonary disease) ([facility]     Depression     History of blood transfusion     Hyperlipidemia     Hypertension     PTSD (post-traumatic stress disorder)     [facility]     Seizures ([facility]     Thyroid disease        Status EXAM:  Status and Exam  Normal: No  Facial Expression: Sad, Flat  Affect: Appropriate  Level of Consciousness: Alert  Mood:Normal: No  Mood: Depressed, Sad  Motor Activity:Normal: Yes  Interview Behavior: Cooperative  Preception: [place] to Person, Doc Dynes to Time, [place] to Place, [place] to Situation  Attention:Normal: No  Attention: Distractible  Thought Processes: Circumstantial  Thought Content:Normal: Yes  Hallucinations: None  Delusions: No  Memory:Normal: No  Memory: Poor Recent, Poor Remote  Insight and Judgment: No  Insight and Judgment: Poor Judgment, Unmotivated  Present Suicidal Ideation: Yes(SI no plan)  Present Homicidal Ideation: No    Tobacco Screening:  Practical Counseling, on admission, jacque X, if applicable and completed (first 3 are required if patient doesn't refuse): ( x)  Recognizing danger situations (included triggers and roadblocks)                    ( x)  Coping skills (new ways to manage stress, exercise, relaxation techniques, changing routine, distraction)                                                           (x)  Basic information about quitting (benefits of quitting, techniques in how to quit, available resources  ( ) Referral for counseling faxed to [facility]                                           ( ) Patient refused counseling  ( ) Patient has not smoked in the last 30 days    Metabolic Screening:    Lab Results   Component Value Date    LABA1C 5.4 08/04/2018       Lab Results   Component Value Date    CHOL 222 (H) 08/04/2018    CHOL 196 01/30/2018    CHOL 223 (H) 11/29/2017    CHOL 191 09/12/2017    CHOL 201 (H) 05/16/2017     Lab Results   Component Value Date    TRIG 224 (H) 08/04/2018    TRIG 248 (H) 01/30/2018    TRIG 233 (H) 11/29/2017    TRIG 230 (H) 09/12/2017    TRIG 149 05/16/2017     Lab Results   Component Value Date    HDL 42 08/04/2018    HDL 49 01/30/2018    HDL 49 11/29/2017    HDL 48 09/12/2017    HDL 60 05/16/2017     No components found for: LDLCAL  Lab Results   Component Value Date    LABVLDL 45 08/04/2018    LABVLDL 50 01/30/2018    LABVLDL 47 11/29/2017    LABVLDL 46 09/12/2017    LABVLDL 30 05/16/2017         Body mass index is 39.32 kg/m². BP Readings from Last 2 Encounters:   07/05/19 122/78   05/27/19 125/86           Pt admitted with followings belongings:  Dentures: None  Vision - Corrective Lenses: Glasses  Hearing Aid: None  Jewelry: Watch, Necklace  Body Piercings Removed: N/A  Clothing: Footwear, Pants, Shirt  Were All Patient Medications Collected?: Not Applicable  Other Valuables: Other (Comment)(2 tote bags full of coloring books, books)     Valuables sent home with n/a. Valuables placed in safe in security envelope, number:  n/a.
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
## 2019-07-06 NOTE — PLAN OF CARE
Patient is resting quietly in bed with eyes closed at this time. No signs of distress or discomfort noted. No PRN medications given thus far. Safety needs met. No unit problems reported. Will continue to observe and support.      Problem: Suicide risk  Goal: Provide patient with safe environment  Description  Provide patient with safe environment  Outcome: Met This Shift  Note:   Q15 minute rounding continues to ensure patient safety     Problem: Altered Mood, Depressive Behavior:  Goal: Able to verbalize acceptance of life and situations over which he or she has no control  Description  Able to verbalize acceptance of life and situations over which he or she has no control  Outcome: Ongoing  Note:   CLARI pt sleeping       Problem: Altered Mood, Depressive Behavior:  Goal: Able to verbalize and/or display a decrease in depressive symptoms  Description  Able to verbalize and/or display a decrease in depressive symptoms  Outcome: Ongoing  Note:   CLARI pt sleeping

## 2019-07-07 PROCEDURE — 6370000000 HC RX 637 (ALT 250 FOR IP): Performed by: PSYCHIATRY & NEUROLOGY

## 2019-07-07 PROCEDURE — 1240000000 HC EMOTIONAL WELLNESS R&B

## 2019-07-07 PROCEDURE — 99231 SBSQ HOSP IP/OBS SF/LOW 25: CPT | Performed by: NURSE PRACTITIONER

## 2019-07-07 RX ADMIN — PANTOPRAZOLE SODIUM 40 MG: 40 TABLET, DELAYED RELEASE ORAL at 10:03

## 2019-07-07 RX ADMIN — TOPIRAMATE 100 MG: 100 TABLET, FILM COATED ORAL at 10:03

## 2019-07-07 RX ADMIN — Medication 2000 UNITS: at 10:03

## 2019-07-07 RX ADMIN — LEVOTHYROXINE SODIUM 100 MCG: 100 TABLET ORAL at 07:10

## 2019-07-07 RX ADMIN — BENZTROPINE MESYLATE 0.5 MG: 0.5 TABLET ORAL at 10:03

## 2019-07-07 RX ADMIN — TRAZODONE HYDROCHLORIDE 50 MG: 50 TABLET ORAL at 20:32

## 2019-07-07 RX ADMIN — VENLAFAXINE HYDROCHLORIDE 150 MG: 150 CAPSULE, EXTENDED RELEASE ORAL at 10:03

## 2019-07-07 RX ADMIN — TOPIRAMATE 100 MG: 100 TABLET, FILM COATED ORAL at 20:32

## 2019-07-07 RX ADMIN — BENZTROPINE MESYLATE 0.5 MG: 0.5 TABLET ORAL at 20:33

## 2019-07-07 RX ADMIN — ATORVASTATIN CALCIUM 20 MG: 10 TABLET, FILM COATED ORAL at 10:03

## 2019-07-07 ASSESSMENT — PAIN SCALES - GENERAL: PAINLEVEL_OUTOF10: 0

## 2019-07-07 NOTE — PLAN OF CARE
Pt. Is positive for SI with no plan. Pt. Denies HI, hallucinations, and physical complaints currently.

## 2019-07-07 NOTE — PROGRESS NOTES
[x] Tangential  [] Circumstantial      Thought Content:  [x] Denies [] Endorses [] Suicidal [] Homicidal  [] Delusional                                       [] Paranoid  [] Somatic  [] Grandiose     Perception: [x]  None  [] Auditory   [] Visual  [] tactile   [] olfactory  [] Illusions          Insight: [] Intact  [] Fair  [x] Limited    Judgement:  [] Intact  [] Fair  [x] Limited          Assessment/Plan:        Patient Active Problem List   Diagnosis Code    Severe bipolar affective disorder with psychosis ([facility] F31.89    Seizure disorder ([facility] G40.909    Vitamin D deficiency E55.9    Hypothyroidism in adult E03.9    Essential hypertension I10    Mixed hyperlipidemia E78.2    Gastroesophageal reflux disease K21.9    Tobacco use Z72.0    History of suicide attempt Z91.5    Depression F32.9    Suicide attempt ([facility] T14.91XA    Seizures ([facility] R56.9    Depression, major, single episode F32.9    Depressive disorder F32.9    Schizoaffective disorder, bipolar type ([facility] F25.0    Lithium toxicity T56.891A    Depression, acute F32.9    Depression with suicidal ideation F32.9, R45.851    Urge incontinence of urine N39.41    Chronic cough R05    Impacted cerumen of right ear H61.21    Chest pain R07.9    Depression, major, recurrent (HCC) F33.9    PTSD (post-traumatic stress disorder) F43.10         Plan:    []  Patient is refusing medications  [x] Improving as expected   [] Not improving as expected   [] Worsening    []  At Baseline     Will continue current treatment and d/c tomorrow    Reason for more than one antipsychotic:  [x] N/A  [] 3 failed monotherapy(drugs tried):  [] Cross over to a new antipsychotic  [] Taper to monotherapy from polypharmacy  [] Augmentation of Clozapine therapy due to treatment resistance to single therapy      Signed:  Henry Cabral  7/7/2019  3:08 PM

## 2019-07-08 VITALS
BODY MASS INDEX: 39.56 KG/M2 | HEART RATE: 94 BPM | HEIGHT: 62 IN | SYSTOLIC BLOOD PRESSURE: 125 MMHG | DIASTOLIC BLOOD PRESSURE: 86 MMHG | OXYGEN SATURATION: 99 % | TEMPERATURE: 98 F | RESPIRATION RATE: 16 BRPM | WEIGHT: 215 LBS

## 2019-07-08 PROCEDURE — 6370000000 HC RX 637 (ALT 250 FOR IP): Performed by: PSYCHIATRY & NEUROLOGY

## 2019-07-08 PROCEDURE — 99238 HOSP IP/OBS DSCHRG MGMT 30/<: CPT | Performed by: NURSE PRACTITIONER

## 2019-07-08 RX ADMIN — PANTOPRAZOLE SODIUM 40 MG: 40 TABLET, DELAYED RELEASE ORAL at 09:34

## 2019-07-08 RX ADMIN — Medication 2000 UNITS: at 09:33

## 2019-07-08 RX ADMIN — TOPIRAMATE 100 MG: 100 TABLET, FILM COATED ORAL at 09:33

## 2019-07-08 RX ADMIN — VENLAFAXINE HYDROCHLORIDE 150 MG: 150 CAPSULE, EXTENDED RELEASE ORAL at 09:34

## 2019-07-08 RX ADMIN — LEVOTHYROXINE SODIUM 100 MCG: 100 TABLET ORAL at 06:52

## 2019-07-08 RX ADMIN — HYDROXYZINE PAMOATE 50 MG: 50 CAPSULE ORAL at 00:04

## 2019-07-08 RX ADMIN — BENZTROPINE MESYLATE 0.5 MG: 0.5 TABLET ORAL at 09:33

## 2019-07-08 RX ADMIN — ATORVASTATIN CALCIUM 20 MG: 10 TABLET, FILM COATED ORAL at 09:34

## 2019-07-08 ASSESSMENT — PAIN SCALES - GENERAL: PAINLEVEL_OUTOF10: 0

## 2019-07-08 NOTE — CARE COORDINATION
Phone call to [facility] to let them know pt will be discharging today and that sw cannot get in touch with [facility]. Queta Rocha will reach out to mindy lozoya pt cm to notify.

## 2019-07-08 NOTE — PROGRESS NOTES
[facility]  Discharge Note    Pt discharged with followings belongings:   Dentures: None  Vision - Corrective Lenses: Glasses  Hearing Aid: None  Jewelry: Watch, Necklace  Body Piercings Removed: N/A  Clothing: Footwear, Pants, Shirt  Were All Patient Medications Collected?: Not Applicable  Other Valuables: Other (Comment)(2 tote bags full of coloring books, books)   Valuables sent home with yes. Valuables retrieved from safe, Security envelope number:  n/a and returned to patient. Patient left department with Departure Mode: By self, In cab via Mobility at Departure: Ambulatory, discharged to Discharged to: Other (Comment)([facility]). Patient education on aftercare instructions: yes  Information faxed to n/a by n/a Patient verbalize understanding of AVS:  yes.     Status EXAM upon discharge:  Status and Exam  Normal: No  Facial Expression: Worried  Affect: Congruent  Level of Consciousness: Alert  Mood:Normal: No  Mood: Anxious  Motor Activity:Normal: No  Motor Activity: Decreased  Interview Behavior: Cooperative  Preception: [place] to Person, Josh [place] to Time, [place] to Place, [place] to Situation  Attention:Normal: No  Attention: Distractible  Thought Processes: Circumstantial  Thought Content:Normal: No  Thought Content: Poverty of Content  Hallucinations: None  Delusions: No  Memory:Normal: No  Memory: Poor Recent  Insight and Judgment: No  Insight and Judgment: Poor Judgment, Poor Insight  Present Suicidal Ideation: No  Present Homicidal Ideation: No      Metabolic Screening:    Lab Results   Component Value Date    LABA1C 5.4 07/06/2019       Lab Results   Component Value Date    CHOL 248 (H) 07/06/2019    CHOL 222 (H) 08/04/2018    CHOL 196 01/30/2018    CHOL 223 (H) 11/29/2017    CHOL 191 09/12/2017    CHOL 201 (H) 05/16/2017     Lab Results   Component Value Date    TRIG 235 (H) 07/06/2019    TRIG 224 (H) 08/04/2018    TRIG 248 (H) 01/30/2018    TRIG 233 (H) 11/29/2017    TRIG 230 (H) 09/12/2017

## 2019-07-11 ENCOUNTER — HOSPITAL ENCOUNTER (INPATIENT)
Age: 49
LOS: 3 days | Discharge: OTHER INSTITUTION | DRG: 885 | End: 2019-07-15
Attending: EMERGENCY MEDICINE | Admitting: PSYCHIATRY & NEUROLOGY
Payer: COMMERCIAL

## 2019-07-11 DIAGNOSIS — F39 MOOD DISORDER (HCC): ICD-10-CM

## 2019-07-11 DIAGNOSIS — R45.851 SUICIDAL IDEATION: Primary | ICD-10-CM

## 2019-07-11 DIAGNOSIS — E03.9 HYPOTHYROIDISM, ADULT: ICD-10-CM

## 2019-07-11 LAB
ALBUMIN SERPL-MCNC: 4.2 G/DL (ref 3.5–5.2)
ALP BLD-CCNC: 107 U/L (ref 35–104)
ALT SERPL-CCNC: 17 U/L (ref 0–32)
AMPHETAMINE SCREEN, URINE: NOT DETECTED
ANION GAP SERPL CALCULATED.3IONS-SCNC: 11 MMOL/L (ref 7–16)
AST SERPL-CCNC: 15 U/L (ref 0–31)
BARBITURATE SCREEN URINE: NOT DETECTED
BENZODIAZEPINE SCREEN, URINE: NOT DETECTED
BILIRUB SERPL-MCNC: <0.2 MG/DL (ref 0–1.2)
BILIRUBIN URINE: NEGATIVE
BLOOD, URINE: NEGATIVE
BUN BLDV-MCNC: 8 MG/DL (ref 6–20)
CALCIUM SERPL-MCNC: 9.5 MG/DL (ref 8.6–10.2)
CANNABINOID SCREEN URINE: NOT DETECTED
CHLORIDE BLD-SCNC: 102 MMOL/L (ref 98–107)
CLARITY: CLEAR
CO2: 26 MMOL/L (ref 22–29)
COCAINE METABOLITE SCREEN URINE: NOT DETECTED
COLOR: YELLOW
CREAT SERPL-MCNC: 0.6 MG/DL (ref 0.5–1)
GFR AFRICAN AMERICAN: >60
GFR NON-AFRICAN AMERICAN: >60 ML/MIN/1.73
GLUCOSE BLD-MCNC: 103 MG/DL (ref 74–99)
GLUCOSE URINE: NEGATIVE MG/DL
HCG, URINE, POC: NEGATIVE
HCT VFR BLD CALC: 38.6 % (ref 34–48)
HEMOGLOBIN: 12 G/DL (ref 11.5–15.5)
KETONES, URINE: NEGATIVE MG/DL
LEUKOCYTE ESTERASE, URINE: ABNORMAL
Lab: NORMAL
MCH RBC QN AUTO: 29.1 PG (ref 26–35)
MCHC RBC AUTO-ENTMCNC: 31.1 % (ref 32–34.5)
MCV RBC AUTO: 93.5 FL (ref 80–99.9)
METHADONE SCREEN, URINE: NOT DETECTED
NEGATIVE QC PASS/FAIL: NORMAL
NITRITE, URINE: NEGATIVE
OPIATE SCREEN URINE: NOT DETECTED
PDW BLD-RTO: 13.7 FL (ref 11.5–15)
PH UA: 5.5 (ref 5–9)
PHENCYCLIDINE SCREEN URINE: NOT DETECTED
PLATELET # BLD: 321 E9/L (ref 130–450)
PMV BLD AUTO: 9.9 FL (ref 7–12)
POSITIVE QC PASS/FAIL: NORMAL
POTASSIUM SERPL-SCNC: 3.9 MMOL/L (ref 3.5–5)
PROPOXYPHENE SCREEN: NOT DETECTED
PROTEIN UA: NEGATIVE MG/DL
RBC # BLD: 4.13 E12/L (ref 3.5–5.5)
SODIUM BLD-SCNC: 139 MMOL/L (ref 132–146)
SPECIFIC GRAVITY UA: <=1.005 (ref 1–1.03)
TOTAL PROTEIN: 7.3 G/DL (ref 6.4–8.3)
TSH SERPL DL<=0.05 MIU/L-ACNC: 11.56 UIU/ML (ref 0.27–4.2)
UROBILINOGEN, URINE: 0.2 E.U./DL
WBC # BLD: 9.5 E9/L (ref 4.5–11.5)

## 2019-07-11 PROCEDURE — 80307 DRUG TEST PRSMV CHEM ANLYZR: CPT

## 2019-07-11 PROCEDURE — G0480 DRUG TEST DEF 1-7 CLASSES: HCPCS

## 2019-07-11 PROCEDURE — 99285 EMERGENCY DEPT VISIT HI MDM: CPT

## 2019-07-11 PROCEDURE — 36415 COLL VENOUS BLD VENIPUNCTURE: CPT

## 2019-07-11 PROCEDURE — 84443 ASSAY THYROID STIM HORMONE: CPT

## 2019-07-11 PROCEDURE — 80053 COMPREHEN METABOLIC PANEL: CPT

## 2019-07-11 PROCEDURE — 81001 URINALYSIS AUTO W/SCOPE: CPT

## 2019-07-11 PROCEDURE — 85027 COMPLETE CBC AUTOMATED: CPT

## 2019-07-11 RX ORDER — LEVOTHYROXINE SODIUM 0.1 MG/1
100 TABLET ORAL DAILY
Qty: 30 TABLET | Refills: 0 | Status: SHIPPED | OUTPATIENT
Start: 2019-07-11 | End: 2019-08-29 | Stop reason: SDUPTHER

## 2019-07-12 PROBLEM — F33.2 SEVERE EPISODE OF RECURRENT MAJOR DEPRESSIVE DISORDER, WITHOUT PSYCHOTIC FEATURES (HCC): Status: ACTIVE | Noted: 2019-07-12

## 2019-07-12 LAB
ACETAMINOPHEN LEVEL: <5 MCG/ML (ref 10–30)
BACTERIA: NORMAL /HPF
EPITHELIAL CELLS, UA: NORMAL /HPF
ETHANOL: <10 MG/DL (ref 0–0.08)
RBC UA: NORMAL /HPF (ref 0–2)
SALICYLATE, SERUM: <0.3 MG/DL (ref 0–30)
TRICYCLIC ANTIDEPRESSANTS SCREEN SERUM: NEGATIVE NG/ML
WBC UA: NORMAL /HPF (ref 0–5)

## 2019-07-12 PROCEDURE — 6370000000 HC RX 637 (ALT 250 FOR IP): Performed by: NURSE PRACTITIONER

## 2019-07-12 PROCEDURE — 99221 1ST HOSP IP/OBS SF/LOW 40: CPT | Performed by: NURSE PRACTITIONER

## 2019-07-12 PROCEDURE — 1240000000 HC EMOTIONAL WELLNESS R&B

## 2019-07-12 RX ORDER — BENZTROPINE MESYLATE 1 MG/ML
2 INJECTION INTRAMUSCULAR; INTRAVENOUS 2 TIMES DAILY PRN
Status: DISCONTINUED | OUTPATIENT
Start: 2019-07-12 | End: 2019-07-15 | Stop reason: HOSPADM

## 2019-07-12 RX ORDER — BENZTROPINE MESYLATE 1 MG/1
0.5 TABLET ORAL 2 TIMES DAILY
Status: DISCONTINUED | OUTPATIENT
Start: 2019-07-12 | End: 2019-07-15 | Stop reason: HOSPADM

## 2019-07-12 RX ORDER — TRAZODONE HYDROCHLORIDE 50 MG/1
50 TABLET ORAL NIGHTLY PRN
Status: DISCONTINUED | OUTPATIENT
Start: 2019-07-12 | End: 2019-07-12

## 2019-07-12 RX ORDER — NICOTINE 21 MG/24HR
1 PATCH, TRANSDERMAL 24 HOURS TRANSDERMAL DAILY
Status: DISCONTINUED | OUTPATIENT
Start: 2019-07-12 | End: 2019-07-15 | Stop reason: HOSPADM

## 2019-07-12 RX ORDER — TOPIRAMATE 100 MG/1
100 TABLET, FILM COATED ORAL 2 TIMES DAILY
Status: DISCONTINUED | OUTPATIENT
Start: 2019-07-12 | End: 2019-07-15 | Stop reason: HOSPADM

## 2019-07-12 RX ORDER — ACETAMINOPHEN 325 MG/1
650 TABLET ORAL EVERY 4 HOURS PRN
Status: DISCONTINUED | OUTPATIENT
Start: 2019-07-12 | End: 2019-07-15 | Stop reason: HOSPADM

## 2019-07-12 RX ORDER — CHOLECALCIFEROL (VITAMIN D3) 50 MCG
2000 TABLET ORAL DAILY
Status: DISCONTINUED | OUTPATIENT
Start: 2019-07-12 | End: 2019-07-15 | Stop reason: HOSPADM

## 2019-07-12 RX ORDER — MAGNESIUM HYDROXIDE/ALUMINUM HYDROXICE/SIMETHICONE 120; 1200; 1200 MG/30ML; MG/30ML; MG/30ML
30 SUSPENSION ORAL PRN
Status: DISCONTINUED | OUTPATIENT
Start: 2019-07-12 | End: 2019-07-15 | Stop reason: HOSPADM

## 2019-07-12 RX ORDER — CARVEDILOL 3.12 MG/1
3.12 TABLET ORAL 2 TIMES DAILY WITH MEALS
Status: DISCONTINUED | OUTPATIENT
Start: 2019-07-12 | End: 2019-07-15 | Stop reason: HOSPADM

## 2019-07-12 RX ORDER — VENLAFAXINE HYDROCHLORIDE 150 MG/1
150 CAPSULE, EXTENDED RELEASE ORAL
Status: DISCONTINUED | OUTPATIENT
Start: 2019-07-12 | End: 2019-07-15 | Stop reason: HOSPADM

## 2019-07-12 RX ORDER — PANTOPRAZOLE SODIUM 40 MG/1
40 TABLET, DELAYED RELEASE ORAL DAILY
Status: DISCONTINUED | OUTPATIENT
Start: 2019-07-13 | End: 2019-07-15 | Stop reason: HOSPADM

## 2019-07-12 RX ORDER — OLANZAPINE 5 MG/1
5 TABLET ORAL EVERY 4 HOURS PRN
Status: DISCONTINUED | OUTPATIENT
Start: 2019-07-12 | End: 2019-07-15 | Stop reason: HOSPADM

## 2019-07-12 RX ORDER — OLANZAPINE 10 MG/1
10 INJECTION, POWDER, LYOPHILIZED, FOR SOLUTION INTRAMUSCULAR EVERY 4 HOURS PRN
Status: DISCONTINUED | OUTPATIENT
Start: 2019-07-12 | End: 2019-07-15 | Stop reason: HOSPADM

## 2019-07-12 RX ORDER — TRAZODONE HYDROCHLORIDE 50 MG/1
50 TABLET ORAL NIGHTLY
Status: DISCONTINUED | OUTPATIENT
Start: 2019-07-12 | End: 2019-07-15 | Stop reason: HOSPADM

## 2019-07-12 RX ORDER — ATORVASTATIN CALCIUM 40 MG/1
20 TABLET, FILM COATED ORAL DAILY
Status: DISCONTINUED | OUTPATIENT
Start: 2019-07-12 | End: 2019-07-15 | Stop reason: HOSPADM

## 2019-07-12 RX ORDER — HYDROXYZINE PAMOATE 25 MG/1
50 CAPSULE ORAL 3 TIMES DAILY PRN
Status: DISCONTINUED | OUTPATIENT
Start: 2019-07-12 | End: 2019-07-13

## 2019-07-12 RX ORDER — ACETAMINOPHEN 325 MG/1
650 TABLET ORAL EVERY 4 HOURS PRN
Status: DISCONTINUED | OUTPATIENT
Start: 2019-07-12 | End: 2019-07-12

## 2019-07-12 RX ORDER — LEVOTHYROXINE SODIUM 0.1 MG/1
100 TABLET ORAL DAILY
Status: DISCONTINUED | OUTPATIENT
Start: 2019-07-12 | End: 2019-07-15 | Stop reason: HOSPADM

## 2019-07-12 RX ADMIN — LEVOTHYROXINE SODIUM 100 MCG: 100 TABLET ORAL at 09:36

## 2019-07-12 RX ADMIN — VENLAFAXINE HYDROCHLORIDE 150 MG: 150 CAPSULE, EXTENDED RELEASE ORAL at 09:37

## 2019-07-12 RX ADMIN — TOPIRAMATE 100 MG: 100 TABLET, FILM COATED ORAL at 21:12

## 2019-07-12 RX ADMIN — CARVEDILOL 3.12 MG: 3.12 TABLET, FILM COATED ORAL at 18:06

## 2019-07-12 RX ADMIN — CARVEDILOL 3.12 MG: 3.12 TABLET, FILM COATED ORAL at 09:37

## 2019-07-12 RX ADMIN — Medication 2000 UNITS: at 09:37

## 2019-07-12 RX ADMIN — BENZTROPINE MESYLATE 0.5 MG: 1 TABLET ORAL at 09:36

## 2019-07-12 RX ADMIN — BENZTROPINE MESYLATE 0.5 MG: 1 TABLET ORAL at 21:12

## 2019-07-12 RX ADMIN — TRAZODONE HYDROCHLORIDE 50 MG: 50 TABLET ORAL at 21:12

## 2019-07-12 RX ADMIN — ATORVASTATIN CALCIUM 20 MG: 40 TABLET, FILM COATED ORAL at 09:36

## 2019-07-12 RX ADMIN — TOPIRAMATE 100 MG: 100 TABLET, FILM COATED ORAL at 09:36

## 2019-07-12 ASSESSMENT — SLEEP AND FATIGUE QUESTIONNAIRES
DIFFICULTY STAYING ASLEEP: NO
SLEEP PATTERN: DIFFICULTY ARISING
DIFFICULTY ARISING: YES
DIFFICULTY FALLING ASLEEP: NO
SLEEP PATTERN: DIFFICULTY ARISING
DIFFICULTY FALLING ASLEEP: NO
DO YOU USE A SLEEP AID: YES
RESTFUL SLEEP: NO
DIFFICULTY STAYING ASLEEP: NO
AVERAGE NUMBER OF SLEEP HOURS: 12
AVERAGE NUMBER OF SLEEP HOURS: 12
DO YOU HAVE DIFFICULTY SLEEPING: NO
DIFFICULTY ARISING: YES
RESTFUL SLEEP: NO
DO YOU USE A SLEEP AID: YES
DO YOU HAVE DIFFICULTY SLEEPING: NO

## 2019-07-12 ASSESSMENT — PAIN SCALES - GENERAL
PAINLEVEL_OUTOF10: 0
PAINLEVEL_OUTOF10: 0

## 2019-07-12 ASSESSMENT — LIFESTYLE VARIABLES
HISTORY_ALCOHOL_USE: NO
HISTORY_ALCOHOL_USE: NO

## 2019-07-12 ASSESSMENT — PATIENT HEALTH QUESTIONNAIRE - PHQ9
SUM OF ALL RESPONSES TO PHQ QUESTIONS 1-9: 17
SUM OF ALL RESPONSES TO PHQ QUESTIONS 1-9: 9

## 2019-07-12 NOTE — PROGRESS NOTES
Exam  Normal: No  Facial Expression: Worried, Avoids Gaze  Affect: Congruent  Level of Consciousness: Alert  Mood:Normal: No  Mood: Depressed, Anxious, Irritable  Motor Activity:Normal: No  Motor Activity: Decreased  Interview Behavior: Cooperative  Preception: [place] to Person, Donel Santacruz to Time, [place] to Place, [place] to Situation  Attention:Normal: No  Attention: Distractible  Thought Processes: Circumstantial  Thought Content:Normal: No  Thought Content: Preoccupations  Hallucinations: None  Delusions: No  Memory:Normal: No  Memory: Poor Remote  Insight and Judgment: No  Insight and Judgment: Poor Judgment, Poor Insight  Present Suicidal Ideation: No  Present Homicidal Ideation: No    Tobacco Screening:  Practical Counseling, on admission, jacque X, if applicable and completed (first 3 are required if patient doesn't refuse):            ( )  Recognizing danger situations (included triggers and roadblocks)                    ( )  Coping skills (new ways to manage stress, exercise, relaxation techniques, changing routine, distraction)                                                           ( )  Basic information about quitting (benefits of quitting, techniques in how to quit, available resources  ( ) Referral for counseling faxed to [facility]                                           ( ) Patient refused counseling  ( ) Patient has not smoked in the last 30 days    Metabolic Screening:    Lab Results   Component Value Date    LABA1C 5.4 07/06/2019       Lab Results   Component Value Date    CHOL 248 (H) 07/06/2019    CHOL 222 (H) 08/04/2018    CHOL 196 01/30/2018    CHOL 223 (H) 11/29/2017    CHOL 191 09/12/2017    CHOL 201 (H) 05/16/2017     Lab Results   Component Value Date    TRIG 235 (H) 07/06/2019    TRIG 224 (H) 08/04/2018    TRIG 248 (H) 01/30/2018    TRIG 233 (H) 11/29/2017    TRIG 230 (H) 09/12/2017    TRIG 149 05/16/2017     Lab Results   Component Value Date    HDL 48 07/06/2019    HDL 42

## 2019-07-12 NOTE — GROUP NOTE
Declined to attend goals group and but declined to set goal for the day. Declining encouragement to re-frame just stated \"No\"                                                                       Group Therapy Note    Date: July 12    Group Start Time: 1015  Group End Time: 7348  Group Topic: Psychoeducation    [facility] ACUTE  2    Lauren Dubon, CTRS        Group Therapy Note    Number of participants: 7  Type of group: Psychoeducation  Mode of intervention: Education, Support, Socialization, Exploration, Clarifying, Problem-solving and Activity  Topic: Positive Attitude Ball   Objective:  Patient will identify ways to maintain a positive attitude in recovery. Notes:  Patient was interactive during group sharing ways to maintain a positive attitude in recovery. Patient gave support and feedback to others. Status After Intervention:  Improved    Participation Level:  Active Listener and Interactive    Participation Quality: Appropriate, Attentive, Sharing and Supportive      Speech:  normal      Thought Process/Content: Logical      Affective Functioning: Flat      Mood: depressed      Level of consciousness:  Alert, Oriented x4 and Attentive      Response to Learning: Able to verbalize current knowledge/experience, Able to verbalize/acknowledge new learning, Able to retain information, Capable of insight, Able to change behavior and Progressing to goal      Endings: None Reported    Modes of Intervention: Education, Support, Socialization, Exploration, Clarifying, Problem-solving and Activity

## 2019-07-12 NOTE — ED PROVIDER NOTES
mother; No Known Problems in her father. The patients home medications have been reviewed. Allergies: Adhesive tape    --------------------------------------------- RESULTS ----------------------------------------------  All laboratory and imaging studies were reviewed by myself.     LABS:  Results for orders placed or performed during the hospital encounter of 07/11/19   CBC   Result Value Ref Range    WBC 9.5 4.5 - 11.5 E9/L    RBC 4.13 3.50 - 5.50 E12/L    Hemoglobin 12.0 11.5 - 15.5 g/dL    Hematocrit 38.6 34.0 - 48.0 %    MCV 93.5 80.0 - 99.9 fL    MCH 29.1 26.0 - 35.0 pg    MCHC 31.1 (L) 32.0 - 34.5 %    RDW 13.7 11.5 - 15.0 fL    Platelets 636 503 - 841 E9/L    MPV 9.9 7.0 - 12.0 fL   Comprehensive Metabolic Panel   Result Value Ref Range    Sodium 139 132 - 146 mmol/L    Potassium 3.9 3.5 - 5.0 mmol/L    Chloride 102 98 - 107 mmol/L    CO2 26 22 - 29 mmol/L    Anion Gap 11 7 - 16 mmol/L    Glucose 103 (H) 74 - 99 mg/dL    BUN 8 6 - 20 mg/dL    CREATININE 0.6 0.5 - 1.0 mg/dL    GFR Non-African American >60 >=60 mL/min/1.73    GFR African American >60     Calcium 9.5 8.6 - 10.2 mg/dL    Total Protein 7.3 6.4 - 8.3 g/dL    Alb 4.2 3.5 - 5.2 g/dL    Total Bilirubin <0.2 0.0 - 1.2 mg/dL    Alkaline Phosphatase 107 (H) 35 - 104 U/L    ALT 17 0 - 32 U/L    AST 15 0 - 31 U/L   Serum Drug Screen   Result Value Ref Range    Ethanol Lvl <10 mg/dL    Acetaminophen Level <5.0 (L) 10.0 - 38.6 mcg/mL    Salicylate, Serum <1.2 0.0 - 30.0 mg/dL   Urine Drug Screen   Result Value Ref Range    Amphetamine Screen, Urine NOT DETECTED Negative <1000 ng/mL    Barbiturate Screen, Ur NOT DETECTED Negative < 200 ng/mL    Benzodiazepine Screen, Urine NOT DETECTED Negative < 200 ng/mL    Cannabinoid Scrn, Ur NOT DETECTED Negative < 50ng/mL    Cocaine Metabolite Screen, Urine NOT DETECTED Negative < 300 ng/mL    Opiate Scrn, Ur NOT DETECTED Negative < 300ng/mL    PCP Screen, Urine NOT DETECTED Negative < 25 ng/mL    Methadone tender, non distended, +BS, no rebound, guarding, or rigidity. No pulsatile masses appreciated  Extremities: Moves all extremities x 4. Warm and well perfused, no clubbing, cyanosis, or edema. Capillary refill <3 seconds  Skin: warm and dry without rash  Neurologic: GCS 15, CN 2-12 grossly intact, no focal deficits, symmetric strength 5/5 in the upper and lower extremities bilaterally  Psych: Flat Affect, anxious, positive suicidal ideation. negative homicidal ideation or A/V Hallucinations     -------------------------------------- PROGRESS NOTES ----------------------------------------     Medical decision making:    Labs will be reviewed and if within normal limits patient will be medically cleared  Consultations:   Social work     Re-Evaluations:        Counseling: The emergency provider has spoken with thepatient and discussed todays results, in addition to providing specific details for the plan of care and counseling regarding the diagnosis and prognosis. Questions are answered at this time and they are agreeable with the plan.     -------------------------- IMPRESSION AND DISPOSITION ---------------------------------    IMPRESSION  1. Suicidal ideation    2. Mood disorder ([facility]        DISPOSITION  Disposition: Social work to evaluate, patient is medically clear  Patient condition is stable    7/11/19, 10:22 PM.    This note is prepared by Ron Collado, acting as Scribe for Shaunna Reid MD.    Shaunna Reid MD:  The scribe's documentation has been prepared under my direction and personally reviewed by me in its entirety. I confirm that the note above accurately reflects all work, treatment, procedures, and medical decision making performed by me.          Shaunna Reid MD  07/11/19 8071       Shaunna Reid MD  07/12/19 4010

## 2019-07-12 NOTE — H&P
PSYCHIATRIC EVALUATION  (HISTORY & PHYSICAL)     CHIEF COMPLAINT:   [x] Mood Problems [x] Anxiety Problems [] Psychosis                    [x] Suicidal/Homicidal   [] Aggression  [] Other    HISTORY OF PRESENT ILLNESS: Tanya Adams  is a 52 y.o. female who has a previous psychiatric history of bipolar disorder, borderline disorder, schizophrenia, and SA presents for admission with SI with plan to cut self with razor. Symptoms onset was years ago and is becoming severe for the last day. This presentation associates with increased depression, anxiety, SI, labile mood, helplessness, and hopelessness. Symptoms are constant and usually is worsened by nothing. Precipitating factor:  Patient discharged from [facility] to crisis unit and is unhappy about where she is living and is having trouble adjusting. Her group home had recently closed. Pt states it feels like everything this past month has been crashing down, admits asking nurses for razors. The anniversary of her dad's death is coming up.         Precipitating Factors:     [] Family Stress   [] Recent loss/grief Stress   [] Health Stress   [] Relationship Stress    [] Legal Stress   [x] Environmental Stress    [] Occupational Stress   [] Financial Stress   [] Substance Abuse [] Other      PAST PSYCHIATRIC HISTORY:     History of psychiatric Hospitalization:    [] Denies    [x]yes  [x] Days ago        []  Weeks Ago    [] Months ago  [] Years ago              [x] [facility]  [] [facility]  [] Other:        [] Once  [x] More than once     Outpatient treatment:  [] Mar[facility] Kaba  [] [facility]  [] [facility]                                      [] [facility]  [] Emely Zavala                                       [] [address] [place] [address] [] [facility]                                       [x] [facility] [] [facility]  [] [facility] [] [facility]                                         [x] currently  [] in the past  [] Non-Compliant    [] Denies     Previous suicide attempt: []Denies [x] yes  [] OD  [] Cutting  [] Hanging  [] Gun  [] Other     Previous psych medications:  [] Was prescribed                                                   [x] Currently Taking                                                   [] Never taken medications    PAST MEDICAL HISTORY:       Diagnosis Date    Arthritis     Asthma     Bipolar 1 disorder ([place] [address].)     Borderline personality disorder in adolescent [facility])     CAD (coronary artery disease)     COPD (chronic obstructive pulmonary disease) ([facility]     Depression     History of blood transfusion     Hyperlipidemia     Hypertension     PTSD (post-traumatic stress disorder)     Schizo affective schizophrenia ([facility]     Seizures ([facility]     Thyroid disease        FAMILY PSYCHIATRIC HISTORY:  Family History   Problem Relation Age of Onset    High Blood Pressure Mother     No Known Problems Father         [] Denies      [x] Endorses    [] Father     [] Depression  [] Anxiety  [] Bipolar  [] Psychosis  []  Other      [x] Mother    [] Depression  [x] Anxiety  [] Bipolar  [] Psychosis  []  Other      [x] Sibling    [] Depression  [x] Anxiety  [] Bipolar  [] Psychosis  []  Other      [] Grandparent    [] Depression  [] Anxiety  [] Bipolar  [] Psychosis  []  Other      SOCIAL HISTORY:     1. Living Situation:[] Private Residence [] Homeless [] [address]                                   [] Assisted Living [x] [address]  [] Shelter [] Other   2. Employment:  [] Unemployed  [] Employed  [x] Disabled  [] Retired   1. Legal History: [x] No Arrest [] Arrest  [] Theft  []  Assault  [] Substances   4. History of Trama/ Abuse: [] Denies  [] Emotional [] Physical [] Sexual   5. Spirituality: [x] Spiritual [] Not Spiritual   6.  Substance Abuse: [x] Denies  [] Drug of choice                                       [] Amphetamines [] Marijuana [] Cocaine                                       [] Opioids  [] Alcohol  [] Benzodiazepines      For further SH review vomiting []  diarrhea  Genitourinary Symptoms: []  dysuria  []  hematuria   Musculoskeletal Symptoms: []  back pain []  muscle pain []  joint pain  Neurologic Symptoms: []  headache []  dizziness  Hematolymphoid Symptoms: [] Adenopathy [] Bruises   [] Schimosis       VITALS: /67   Pulse 81   Temp 98 °F (36.7 °C) (Oral)   Resp 14   Ht 5' 2\" (1.575 m)   Wt 215 lb (97.5 kg)   LMP 02/13/2014 (Approximate)   SpO2 94%   BMI 39.32 kg/m²     ALLERGIES: Adhesive tape            Physical Examination:    Head:  [x] Atraumatic:  [x] normocephalic  Skin and Mucosa       [] Moist [] Dry [] Pale [x] Normal   Neck: [x] Thyroid [] Palpable    [x] Not palpable []  venus distention [] adenopathy   Chest: [x] Clear [] Rhonchi  [] Wheezing   CV: [x] S1 [x] S2 [x] No murmer   Abdomen:  [x] Soft   [] Tender  [] Viceromegaly   Extremities:  [x] No Edema   [] Edema     Cranial Nerves Examination:     CN II: [x] Pupils are reactive to light [] Pupils are non reactive to light  CN III, IV, VI:[x] No eye deviation  [x] No diplopia or ptosis   CN V: [x] Facial Sensation is intact  [] Facial Sensation is not intact   CN IIIV:  [x] Hearing is normal to rubbing fingers   CN IX, X:  [x] Normal gag reflex and phonation   CN XI: [x] Shoulder shrug and neck rotation is normal  CNXII: [x] Tongue is midline no deviation or atrophy         For further PE refer to ED note     MENTAL STATUS EXAM:         Mental Status Examination:     Cognition:       [x] Alert  [x] Awake  [x] Oriented  [x] Person  [x] Place [x] Time       [] drowsy  [] tired  [] lethargic  [] distractable  []      Attention/Concentration:   [x] Attentive  [] Distracted         Memory Recent and Remote: [x] Intact   [] Impaired [] Partially Impaired      Language: [] Able to recognize and name objects                         [] Unable to recognize and name [address] [place] [address] of Knowledge:  [] Poor [x]  Fair  [] Good     Speech: [] Normal  [x] Soft  [] Slow  [] Fast [] Pressured                                    [] Loud [] Dysarthria  [] Incoherent        Appearance: [] Well Groomed  [] Casual Dressed  [] Unkept  [x] Disheveled                         [] Normal weight[] Thin  [] Overweight  [] Obese           Attitude: [] Positive  [] Hostile  [] Demanding  [x] Guarded  [] Defensive                    [x] Cooperative  []  Uncooperative       Behavior:  [x] Normal Gait  [] Walks with Assistance  [] Ivory Melter               [] Walks with Carol Lima  [] In Hospital Bed  [] Sitting in Chair     Muscle-Skeletal:  [x] Normal Muscle Tone [] Muscle Atrophy                                        [] Abnormal Muscle Movement      Eye Contact:   [x] Good eye contact  [] Intermittent Eye Contact  [] Poor Eye Contact      Mood: [] Depressed  [x] Anxious  [] Irritated  [] Euthymic   [] Angry [] Restless     Affect:  [x] Congruent  [] Incongruent  [x] Labile  [] Constricted  [] Flat  [] Bizarre      Thought Process and Association:  [] Logical [x] Illogical                                        [] Linear and Goal Directed  [x] Tangential  [] Circumstantial      Thought Content:  [] Denies [x] Endorses [x] Suicidal [] Homicidal  [] Delusional                                       [] Paranoid  [] Somatic  [] Grandiose     Perception: [x]  None  [] Auditory   [] Visual  [] tactile   [] olfactory  [] Illusions          Insight: [] Intact  [] Fair  [x] Limited    Judgement:  [] Intact  [] Fair  [x] Limited       ASSESSMENT    Patient Active Problem List   Diagnosis    Severe [facility]    Seizure disorder ([facility]    Vitamin D deficiency    Hypothyroidism in adult    Essential hypertension    Mixed hyperlipidemia    Gastroesophageal reflux disease    Tobacco use    History of suicide attempt    Depression    Suicide attempt ([facility]    [facility] ([facility]    Depression, major, single episode    Depressive disorder    Schizoaffective disorder, bipolar type ([facility]

## 2019-07-12 NOTE — CARE COORDINATION
Social  Worker met with patient to complete the  social hx and cssr-s. Pt was cooperative to the interview.  Pt reports she was staying at the crisis unit and started to have suicidal thoughts with a plan to cut her wrist. Pt report she is homeless as the group j home she ws staying in closed     to reach out to patient cipriano to develop a treatment team ,and for additional collateral information

## 2019-07-13 PROCEDURE — 6370000000 HC RX 637 (ALT 250 FOR IP): Performed by: NURSE PRACTITIONER

## 2019-07-13 PROCEDURE — 1240000000 HC EMOTIONAL WELLNESS R&B

## 2019-07-13 PROCEDURE — 99231 SBSQ HOSP IP/OBS SF/LOW 25: CPT | Performed by: NURSE PRACTITIONER

## 2019-07-13 RX ORDER — HYDROXYZINE PAMOATE 25 MG/1
25 CAPSULE ORAL 3 TIMES DAILY PRN
Status: DISCONTINUED | OUTPATIENT
Start: 2019-07-13 | End: 2019-07-15 | Stop reason: HOSPADM

## 2019-07-13 RX ADMIN — ATORVASTATIN CALCIUM 20 MG: 40 TABLET, FILM COATED ORAL at 09:45

## 2019-07-13 RX ADMIN — BENZTROPINE MESYLATE 0.5 MG: 1 TABLET ORAL at 21:04

## 2019-07-13 RX ADMIN — HYDROXYZINE PAMOATE 25 MG: 25 CAPSULE ORAL at 23:50

## 2019-07-13 RX ADMIN — CARVEDILOL 3.12 MG: 3.12 TABLET, FILM COATED ORAL at 18:12

## 2019-07-13 RX ADMIN — BENZTROPINE MESYLATE 0.5 MG: 1 TABLET ORAL at 09:44

## 2019-07-13 RX ADMIN — Medication 2000 UNITS: at 09:45

## 2019-07-13 RX ADMIN — LEVOTHYROXINE SODIUM 100 MCG: 100 TABLET ORAL at 06:45

## 2019-07-13 RX ADMIN — VENLAFAXINE HYDROCHLORIDE 150 MG: 150 CAPSULE, EXTENDED RELEASE ORAL at 09:45

## 2019-07-13 RX ADMIN — TRAZODONE HYDROCHLORIDE 50 MG: 50 TABLET ORAL at 21:03

## 2019-07-13 RX ADMIN — TOPIRAMATE 100 MG: 100 TABLET, FILM COATED ORAL at 09:45

## 2019-07-13 RX ADMIN — PANTOPRAZOLE SODIUM 40 MG: 40 TABLET, DELAYED RELEASE ORAL at 06:45

## 2019-07-13 RX ADMIN — TOPIRAMATE 100 MG: 100 TABLET, FILM COATED ORAL at 21:03

## 2019-07-13 ASSESSMENT — PAIN SCALES - GENERAL
PAINLEVEL_OUTOF10: 0
PAINLEVEL_OUTOF10: 0

## 2019-07-13 NOTE — GROUP NOTE
Group Therapy Note    Date: July 13    Group Start Time: 6290  Group End Time: 2286  Group Topic: Psychoeducation : Med Education     [facility] ACUTE  45 Gini Kitchen RN        Group Therapy Note    Attendees: 10/14     Neida Dale RN

## 2019-07-13 NOTE — PROGRESS NOTES
DATE OF SERVICE:     7/13/2019    Tanya Adams seen today for the purpose of continuation of care. Nursing, social work reports, laboratory studies and vital signs are reviewed. Patient chief complaint today is:             [x] Depression      [x] Anxiety        [] Psychosis         [x] Suicidal/Homicidal                         [] Delusions           [] Aggression          Subjective: Today patient states that she is depressed. Slept well last night. Still having SI that comes and goes. Med compliant and goes to groups. Sleep:  [x] Good [] Fair  [] Poor  Appetite:  [x] Good [] Fair  [] Poor    Depression:  [] Mild [] Moderate [x] Severe                [x] Constant [] Sporadic     Anxiety: [] Mild [] Moderate [x] Severe    [x] Constant [] Sporadic     Delusions: [] Mild [] Moderate [] Severe     [] Constant [] Sporadic     [] Paranoid [] Somatic [] Grandiose     Hallucinations: [] Mild [] Moderate [] Severe     [] Constant [] Sporadic    [] Auditory  [] Visual [] Tactile       Suicidal: [] Constant [x] Sporadic  Homicidal: [] Constant [] Sporadic    Unscheduled Medications     [] Patient Receiving Emergency Medications \" Chemical Restraint\"   [] Requesting PRN medications for anxiety    Medical Review of Systems:     All other than marked systmes have been reviewed and are all negative.     Constitutional Symptoms: []  fever []  Chills  Skin Symptoms: [] rash []  Pruritus   Eye Symptoms: [] Vision unchanged []  recent vision problems[] blurred vision   Respiratory Symptoms:[] shortness of breath [] cough  Cardiovascular Symptoms:  [] chest pain   [] palpitations   Gastrointestinal Symptoms: []  abdominal pain []  nausea []  vomiting []  diarrhea  Genitourinary Symptoms: []  dysuria  []  hematuria   Musculoskeletal Symptoms: []  back pain []  muscle pain []  joint pain  Neurologic Symptoms: []  headache []  dizziness  Hematolymphoid Symptoms: [] Adenopathy [] Bruises   [] Schimosis       Psychiatric treatment resistance to single therapy      Signed:  Se Lara  7/13/2019  12:41 PM

## 2019-07-14 PROCEDURE — 1240000000 HC EMOTIONAL WELLNESS R&B

## 2019-07-14 PROCEDURE — 6370000000 HC RX 637 (ALT 250 FOR IP): Performed by: NURSE PRACTITIONER

## 2019-07-14 PROCEDURE — 99231 SBSQ HOSP IP/OBS SF/LOW 25: CPT | Performed by: NURSE PRACTITIONER

## 2019-07-14 RX ADMIN — BENZTROPINE MESYLATE 0.5 MG: 1 TABLET ORAL at 20:46

## 2019-07-14 RX ADMIN — TRAZODONE HYDROCHLORIDE 50 MG: 50 TABLET ORAL at 20:46

## 2019-07-14 RX ADMIN — Medication 2000 UNITS: at 09:27

## 2019-07-14 RX ADMIN — BENZTROPINE MESYLATE 0.5 MG: 1 TABLET ORAL at 09:28

## 2019-07-14 RX ADMIN — VENLAFAXINE HYDROCHLORIDE 150 MG: 150 CAPSULE, EXTENDED RELEASE ORAL at 09:27

## 2019-07-14 RX ADMIN — CARVEDILOL 3.12 MG: 3.12 TABLET, FILM COATED ORAL at 18:10

## 2019-07-14 RX ADMIN — CARVEDILOL 3.12 MG: 3.12 TABLET, FILM COATED ORAL at 09:27

## 2019-07-14 RX ADMIN — TOPIRAMATE 100 MG: 100 TABLET, FILM COATED ORAL at 09:27

## 2019-07-14 RX ADMIN — PANTOPRAZOLE SODIUM 40 MG: 40 TABLET, DELAYED RELEASE ORAL at 06:36

## 2019-07-14 RX ADMIN — TOPIRAMATE 100 MG: 100 TABLET, FILM COATED ORAL at 20:46

## 2019-07-14 RX ADMIN — HYDROXYZINE PAMOATE 25 MG: 25 CAPSULE ORAL at 09:27

## 2019-07-14 RX ADMIN — LEVOTHYROXINE SODIUM 100 MCG: 100 TABLET ORAL at 06:35

## 2019-07-14 RX ADMIN — ATORVASTATIN CALCIUM 20 MG: 40 TABLET, FILM COATED ORAL at 09:27

## 2019-07-14 ASSESSMENT — PAIN SCALES - GENERAL
PAINLEVEL_OUTOF10: 0
PAINLEVEL_OUTOF10: 0

## 2019-07-15 VITALS
OXYGEN SATURATION: 99 % | TEMPERATURE: 97.4 F | HEART RATE: 66 BPM | SYSTOLIC BLOOD PRESSURE: 190 MMHG | RESPIRATION RATE: 17 BRPM | DIASTOLIC BLOOD PRESSURE: 82 MMHG | HEIGHT: 62 IN | BODY MASS INDEX: 39.56 KG/M2 | WEIGHT: 215 LBS

## 2019-07-15 PROCEDURE — 99238 HOSP IP/OBS DSCHRG MGMT 30/<: CPT | Performed by: NURSE PRACTITIONER

## 2019-07-15 PROCEDURE — 6370000000 HC RX 637 (ALT 250 FOR IP): Performed by: NURSE PRACTITIONER

## 2019-07-15 RX ORDER — NICOTINE 21 MG/24HR
1 PATCH, TRANSDERMAL 24 HOURS TRANSDERMAL DAILY
Qty: 30 PATCH | Refills: 0 | Status: SHIPPED | OUTPATIENT
Start: 2019-07-15 | End: 2019-07-20

## 2019-07-15 RX ADMIN — BENZTROPINE MESYLATE 0.5 MG: 1 TABLET ORAL at 08:59

## 2019-07-15 RX ADMIN — Medication 2000 UNITS: at 09:00

## 2019-07-15 RX ADMIN — TOPIRAMATE 100 MG: 100 TABLET, FILM COATED ORAL at 09:00

## 2019-07-15 RX ADMIN — ATORVASTATIN CALCIUM 20 MG: 40 TABLET, FILM COATED ORAL at 09:00

## 2019-07-15 RX ADMIN — LEVOTHYROXINE SODIUM 100 MCG: 100 TABLET ORAL at 06:23

## 2019-07-15 RX ADMIN — VENLAFAXINE HYDROCHLORIDE 150 MG: 150 CAPSULE, EXTENDED RELEASE ORAL at 08:59

## 2019-07-15 RX ADMIN — PANTOPRAZOLE SODIUM 40 MG: 40 TABLET, DELAYED RELEASE ORAL at 06:23

## 2019-07-15 RX ADMIN — CARVEDILOL 3.12 MG: 3.12 TABLET, FILM COATED ORAL at 08:59

## 2019-07-15 ASSESSMENT — PAIN SCALES - GENERAL: PAINLEVEL_OUTOF10: 0

## 2019-07-15 NOTE — DISCHARGE SUMMARY
[]  Fair  [] Good    Speech: [x] Normal  [] Soft  [] Slow  [] Fast [] Pressured            [] Loud [] Dysarthria  [] Incoherent       Appearance: [] Well Groomed  [x] Casual Dressed  [] Unkept  [] Disheveled          [] Normal weight[] Thin  [] Overweight  [] Obese           Attitude: [] Positive  [] Hostile  [] Demanding  [] Guarded  [] Defensive         [x] Cooperative  []  Uncooperative      Behavior:  [x] Normal Gait  [] Walks with Assistance  [] Radha Fuentes    [] Walks with Kurtis Machuca  [] In Hospital Bed  [] Sitting in Chair    Muscle-Skeletal:  [x] Normal Muscle Tone [] Muscle Atrophy       [] Abnormal Muscle Movement     Eye Contact:  [x] Good eye contact  [] Intermittent Eye Contact  [] Poor Eye Contact     Mood: [] Depressed  [] Anxious  [] Irritated  [x] Euthymic   [] Angry [] Restless    Affect:  [x] Congruent  [] Incongruent  [] Labile  [] Constricted  [] Flat  [] Bizarre     Thought Process and Association:  [] Logical [] Illogical       [x] Linear and Goal Directed  [] Tangential  [] Circumstantial     Thought Content:  [x] Denies [] Endorses [] Suicidal [] Homicidal  [] Delusional      [] Paranoid  [] Somatic  [] Grandiose    Perception: [x]  None  [] Auditory   [] Visual  [] tactile   [] olfactory  [] Illusions         Insight: [] Intact  [x] Fair  [] Limited    Judgement:  [] Intact  [x] Fair  [] Limited    Hospital Course:   Admit Date: 7/11/2019     Discharge Date: 7/15/2019  Admitted from:  [x]  Emergency Room  []  Home  []  Another facility   []  [facility]     Admitting diagnosis:   Patient Active Problem List   Diagnosis    [facility]    [facility]    Vitamin D deficiency    Hypothyroidism in adult    Essential hypertension    Mixed hyperlipidemia    Gastroesophageal reflux disease    Tobacco use    History of suicide attempt    Depression    [facility]    Seizures ([facility]    Depression, major, single episode    Depressive

## 2019-07-15 NOTE — CARE COORDINATION
In order to ensure appropriate transition and discharge planning is in place, the following documents have been transmitted to Suni Bejarano as the new outpatient provider:     The d/c diagnosis was transmitted to the next care provider   The reason for hospitalization was transmitted to the next care provider   The d/c medications (dosage and indication) were transmitted to the next care provider    The continuing care plan was transmitted to the next care provider

## 2019-07-15 NOTE — PLAN OF CARE
Patient is resting in bed with eyes closed. No signs of distress or discomfort. No unit issues. Safety needs met. Will continue to monitor closely.           Problem: Altered Mood, Depressive Behavior:  Goal: Absence of self-harm  Description  Absence of self-harm  7/13/2019 0406 by Katina Epley, RN  Outcome: Met This Shift           Electronically signed by Katina Epley, RN on 7/13/2019 at 4:07 AM
Patient resting quiet to self at this time, respirations are even and unlabored, no signs or symptoms of distress or discomfort. No PRN medications given thus far this shift. Staff will continue to conduct environmental rounds to ensure the safety of everyone on the unit. Staff will provide support and interventions as requested or required.
Preoccupations  Hallucinations: Visual (Comment)  Delusions: No  Memory:Normal: Yes  Memory: Poor Recent  Insight and Judgment: No  Insight and Judgment: Poor Insight, Poor Judgment  Present Suicidal Ideation: No  Present Homicidal Ideation: No    Daily Assessment Last Entry:   Daily Sleep (WDL): Within Defined Limits         Patient Currently in Pain: Other (comment)(CLARI pt sleeping)  Daily Nutrition (WDL): Within Defined Limits    Patient Monitoring:  Frequency of Checks: 4 times per hour, close    Psychiatric Symptoms:   Depression Symptoms  Depression Symptoms: Isolative, Impaired concentration  Anxiety Symptoms  Anxiety Symptoms: Generalized  Romina Symptoms  Romina Symptoms: No problems reported or observed. Psychosis Symptoms  Delusion Type: No problems reported or observed. Suicide Risk CSSR-S:  1) Within the past month, have you wished you were dead or wished you could go to sleep and not wake up? : Yes  2) Have you actually had any thoughts of killing yourself? : Yes  3) Have you been thinking about how you might kill yourself? : Yes  5) Have you started to work out or worked out the details of how to kill yourself?  Do you intend to carry out this plan? : No  6) Have you ever done anything, started to do anything, or prepared to do anything to end your life?: Yes  Change in Result no Change in Plan of care no      EDUCATION:   Learner Progress Toward Treatment Goals: Reviewed results and recommendations of this team, Reviewed group plan and strategies, Reviewed signs, symptoms and risk of self harm and violent behavior and Reviewed goals and plan of care    Method: Group    Outcome: Verbalized understanding    PATIENT GOALS: none provided    PLAN/TREATMENT RECOMMENDATIONS UPDATE: discharge today to Crisis Unit    GOALS UPDATE:   Time frame for Short-Term Goals: n/a      Shane Sousa RN

## 2019-07-15 NOTE — PROGRESS NOTES
[facility]  Discharge Note    Pt discharged with followings belongings:   Dentures: None  Vision - Corrective Lenses: None  Hearing Aid: None  Jewelry: Watch(1 black wristwatch)  Clothing: Shirt, Undergarments (Comment), Pants, Other (Comment), Socks, Footwear(1 pr of tennis shoes, 1 pr gray pants, 1 radha-dye shirt, 1 pr black socks, 1 pr white panties, 1 blue hoodie)  Were All Patient Medications Collected?: Not Applicable  Other Valuables: Money (Comment), Other (Comment)($1.00 bill x2, 1 sonido bear, (1) 1/2 pk of cigars, [place] state ID card, insurance card, 1 blue canvas bag of coloring pages, books, supplies, and a clipboard. )   Patient left department with all belongings   via taxi  , discharged to the crisis unit  . Patient education on aftercare instructions: yes  Patient verbalize understanding of AVS:  yes.     Status EXAM upon discharge:  Status and Exam  Normal: No  Facial Expression: Sad, Worried  Affect: Congruent  Level of Consciousness: Alert  Mood:Normal: No  Mood: Anxious  Motor Activity:Normal: Yes  Motor Activity: (WDL)  Interview Behavior: Cooperative  Preception: [place] to Person, [place] to Time, [place] to Place, [place] to Situation  Attention:Normal: No  Attention: Distractible  Thought Processes: Circumstantial  Thought Content:Normal: No  Thought Content: Preoccupations  Hallucinations: Visual (Comment)  Delusions: No  Memory:Normal: Yes  Memory: Poor Recent  Insight and Judgment: No  Insight and Judgment: Poor Insight, Poor Judgment  Present Suicidal Ideation: No  Present Homicidal Ideation: No      Metabolic Screening:    Lab Results   Component Value Date    LABA1C 5.4 07/06/2019       Lab Results   Component Value Date    CHOL 248 (H) 07/06/2019    CHOL 222 (H) 08/04/2018    CHOL 196 01/30/2018    CHOL 223 (H) 11/29/2017    CHOL 191 09/12/2017    CHOL 201 (H) 05/16/2017     Lab Results   Component Value Date    TRIG 235 (H) 07/06/2019    TRIG 224 (H) 08/04/2018    TRIG 248

## 2019-07-19 ENCOUNTER — HOSPITAL ENCOUNTER (EMERGENCY)
Age: 49
Discharge: PSYCHIATRIC OR SUBSTANCE USE FACILITY | End: 2019-07-20
Attending: EMERGENCY MEDICINE
Payer: COMMERCIAL

## 2019-07-19 DIAGNOSIS — R45.851 DEPRESSION WITH SUICIDAL IDEATION: Primary | ICD-10-CM

## 2019-07-19 DIAGNOSIS — F32.A DEPRESSION WITH SUICIDAL IDEATION: Primary | ICD-10-CM

## 2019-07-19 DIAGNOSIS — E03.9 HYPOTHYROIDISM, ADULT: ICD-10-CM

## 2019-07-19 LAB
ACETAMINOPHEN LEVEL: <5 MCG/ML (ref 10–30)
ALBUMIN SERPL-MCNC: 4.4 G/DL (ref 3.5–5.2)
ALP BLD-CCNC: 128 U/L (ref 35–104)
ALT SERPL-CCNC: <5 U/L (ref 0–32)
AMPHETAMINE SCREEN, URINE: NOT DETECTED
ANION GAP SERPL CALCULATED.3IONS-SCNC: 13 MMOL/L (ref 7–16)
AST SERPL-CCNC: 11 U/L (ref 0–31)
BARBITURATE SCREEN URINE: NOT DETECTED
BASOPHILS ABSOLUTE: 0.05 E9/L (ref 0–0.2)
BASOPHILS RELATIVE PERCENT: 0.5 % (ref 0–2)
BENZODIAZEPINE SCREEN, URINE: NOT DETECTED
BILIRUB SERPL-MCNC: 0.2 MG/DL (ref 0–1.2)
BUN BLDV-MCNC: 4 MG/DL (ref 6–20)
CALCIUM SERPL-MCNC: 9.3 MG/DL (ref 8.6–10.2)
CANNABINOID SCREEN URINE: NOT DETECTED
CHLORIDE BLD-SCNC: 98 MMOL/L (ref 98–107)
CO2: 25 MMOL/L (ref 22–29)
COCAINE METABOLITE SCREEN URINE: NOT DETECTED
CREAT SERPL-MCNC: 0.7 MG/DL (ref 0.5–1)
EOSINOPHILS ABSOLUTE: 0.15 E9/L (ref 0.05–0.5)
EOSINOPHILS RELATIVE PERCENT: 1.6 % (ref 0–6)
ETHANOL: <10 MG/DL (ref 0–0.08)
GFR AFRICAN AMERICAN: >60
GFR NON-AFRICAN AMERICAN: >60 ML/MIN/1.73
GLUCOSE BLD-MCNC: 127 MG/DL (ref 74–99)
HCG, URINE, POC: NEGATIVE
HCT VFR BLD CALC: 35.9 % (ref 34–48)
HEMOGLOBIN: 11.8 G/DL (ref 11.5–15.5)
IMMATURE GRANULOCYTES #: 0.09 E9/L
IMMATURE GRANULOCYTES %: 0.9 % (ref 0–5)
LYMPHOCYTES ABSOLUTE: 3.04 E9/L (ref 1.5–4)
LYMPHOCYTES RELATIVE PERCENT: 31.4 % (ref 20–42)
Lab: NORMAL
MAGNESIUM: 1.8 MG/DL (ref 1.6–2.6)
MCH RBC QN AUTO: 29.9 PG (ref 26–35)
MCHC RBC AUTO-ENTMCNC: 32.9 % (ref 32–34.5)
MCV RBC AUTO: 91.1 FL (ref 80–99.9)
METHADONE SCREEN, URINE: NOT DETECTED
MONOCYTES ABSOLUTE: 0.68 E9/L (ref 0.1–0.95)
MONOCYTES RELATIVE PERCENT: 7 % (ref 2–12)
NEGATIVE QC PASS/FAIL: NORMAL
NEUTROPHILS ABSOLUTE: 5.66 E9/L (ref 1.8–7.3)
NEUTROPHILS RELATIVE PERCENT: 58.6 % (ref 43–80)
OPIATE SCREEN URINE: NOT DETECTED
PDW BLD-RTO: 13.8 FL (ref 11.5–15)
PHENCYCLIDINE SCREEN URINE: NOT DETECTED
PLATELET # BLD: 313 E9/L (ref 130–450)
PMV BLD AUTO: 9.9 FL (ref 7–12)
POSITIVE QC PASS/FAIL: NORMAL
POTASSIUM REFLEX MAGNESIUM: 3.3 MMOL/L (ref 3.5–5)
PROPOXYPHENE SCREEN: NOT DETECTED
RBC # BLD: 3.94 E12/L (ref 3.5–5.5)
SALICYLATE, SERUM: <0.3 MG/DL (ref 0–30)
SODIUM BLD-SCNC: 136 MMOL/L (ref 132–146)
TOTAL PROTEIN: 7.5 G/DL (ref 6.4–8.3)
TRICYCLIC ANTIDEPRESSANTS SCREEN SERUM: NEGATIVE NG/ML
WBC # BLD: 9.7 E9/L (ref 4.5–11.5)

## 2019-07-19 PROCEDURE — 6370000000 HC RX 637 (ALT 250 FOR IP): Performed by: EMERGENCY MEDICINE

## 2019-07-19 PROCEDURE — 85025 COMPLETE CBC W/AUTO DIFF WBC: CPT

## 2019-07-19 PROCEDURE — 80053 COMPREHEN METABOLIC PANEL: CPT

## 2019-07-19 PROCEDURE — G0480 DRUG TEST DEF 1-7 CLASSES: HCPCS

## 2019-07-19 PROCEDURE — 80307 DRUG TEST PRSMV CHEM ANLYZR: CPT

## 2019-07-19 PROCEDURE — 99285 EMERGENCY DEPT VISIT HI MDM: CPT

## 2019-07-19 PROCEDURE — 36415 COLL VENOUS BLD VENIPUNCTURE: CPT

## 2019-07-19 PROCEDURE — 83735 ASSAY OF MAGNESIUM: CPT

## 2019-07-19 RX ORDER — POTASSIUM CHLORIDE 20 MEQ/1
40 TABLET, EXTENDED RELEASE ORAL ONCE
Status: COMPLETED | OUTPATIENT
Start: 2019-07-19 | End: 2019-07-19

## 2019-07-19 RX ORDER — HALOPERIDOL DECANOATE 100 MG/ML
150 INJECTION INTRAMUSCULAR
Status: ON HOLD | COMMUNITY
End: 2019-09-23 | Stop reason: HOSPADM

## 2019-07-19 RX ORDER — AMITRIPTYLINE HYDROCHLORIDE 25 MG/1
25 TABLET, FILM COATED ORAL NIGHTLY
Status: ON HOLD | COMMUNITY
End: 2019-09-23 | Stop reason: HOSPADM

## 2019-07-19 RX ADMIN — POTASSIUM CHLORIDE 40 MEQ: 20 TABLET, EXTENDED RELEASE ORAL at 22:19

## 2019-07-19 ASSESSMENT — ENCOUNTER SYMPTOMS
SHORTNESS OF BREATH: 0
DIARRHEA: 0
ABDOMINAL PAIN: 0
VOMITING: 0
NAUSEA: 0

## 2019-07-20 VITALS
DIASTOLIC BLOOD PRESSURE: 81 MMHG | RESPIRATION RATE: 16 BRPM | BODY MASS INDEX: 39.56 KG/M2 | HEIGHT: 62 IN | HEART RATE: 77 BPM | OXYGEN SATURATION: 96 % | TEMPERATURE: 97.3 F | WEIGHT: 215 LBS | SYSTOLIC BLOOD PRESSURE: 114 MMHG

## 2019-07-20 RX ORDER — HYDROXYZINE HYDROCHLORIDE 25 MG/1
25 TABLET, FILM COATED ORAL 2 TIMES DAILY
Status: ON HOLD | COMMUNITY
End: 2019-11-22 | Stop reason: ALTCHOICE

## 2019-07-20 NOTE — ED NOTES
Jelena at [facility] is awaiting a call back from her supervisor regarding their ability to accept pt without verifying the medicare bed days     MARK Wasserman  07/20/19 9660

## 2019-07-20 NOTE — ED NOTES
I SPOKE WITH PRADIP AT [facility]. SHE HAS NOT RECE[facility]     I FAXED THE CHART INFORMATION AND THE VERIFICATION OF HER INSURANCE     AWAITING  CALL BACK     Rhonda Lowery [facility]  07/20/19 4261

## 2019-07-20 NOTE — ED NOTES
Bed: 24  Expected date:   Expected time:   Means of arrival:   Comments:  Hold EMS     Lynn Ernandez RN  07/19/19 2001

## 2019-07-20 NOTE — ED NOTES
ARAMIS placed phone call to [facility] [phone] and spoke with Rep. Jayda Centeno. Initiated referral for inpatient mental health.          Mark Sorto, MSW, LSW  07/19/19 7555

## 2019-07-20 NOTE — ED NOTES
This T searched patient's belongings for an insurance card to verify the numbers on the card with the Faces[employer]. Insurance card was located and the numbers indeed match with Facesheet numbers. A copy of the front and back of the card was faxed to [facility] at [phone]. This T also called and notified Jayda Centeno with the [employer] that insurance card and facesheet match. A copy of card was placed in soft chart. FÉLIX sanders.       Luis Fernando Oleary  07/20/19 0870

## 2019-07-22 RX ORDER — LEVOTHYROXINE SODIUM 0.1 MG/1
100 TABLET ORAL DAILY
Qty: 30 TABLET | Refills: 2 | OUTPATIENT
Start: 2019-07-22

## 2019-08-29 DIAGNOSIS — E03.9 HYPOTHYROIDISM, ADULT: ICD-10-CM

## 2019-09-03 RX ORDER — LEVOTHYROXINE SODIUM 0.1 MG/1
100 TABLET ORAL DAILY
Qty: 30 TABLET | Refills: 0 | Status: ON HOLD
Start: 2019-09-03 | End: 2021-06-24 | Stop reason: HOSPADM

## 2019-09-18 ENCOUNTER — HOSPITAL ENCOUNTER (INPATIENT)
Age: 49
LOS: 4 days | Discharge: HOME | DRG: 885 | End: 2019-09-23
Attending: EMERGENCY MEDICINE | Admitting: PSYCHIATRY & NEUROLOGY
Payer: COMMERCIAL

## 2019-09-18 DIAGNOSIS — F39 MOOD DISORDER (HCC): ICD-10-CM

## 2019-09-18 DIAGNOSIS — T14.91XA SUICIDE ATTEMPT (HCC): Primary | ICD-10-CM

## 2019-09-18 LAB
ACETAMINOPHEN LEVEL: <5 MCG/ML (ref 10–30)
AMPHETAMINE SCREEN, URINE: NOT DETECTED
ANION GAP SERPL CALCULATED.3IONS-SCNC: 11 MMOL/L (ref 7–16)
BARBITURATE SCREEN URINE: NOT DETECTED
BASOPHILS ABSOLUTE: 0.05 E9/L (ref 0–0.2)
BASOPHILS RELATIVE PERCENT: 0.5 % (ref 0–2)
BENZODIAZEPINE SCREEN, URINE: NOT DETECTED
BILIRUBIN URINE: NEGATIVE
BLOOD, URINE: NEGATIVE
BUN BLDV-MCNC: 3 MG/DL (ref 6–20)
CALCIUM SERPL-MCNC: 9.4 MG/DL (ref 8.6–10.2)
CANNABINOID SCREEN URINE: NOT DETECTED
CHLORIDE BLD-SCNC: 105 MMOL/L (ref 98–107)
CLARITY: CLEAR
CO2: 25 MMOL/L (ref 22–29)
COCAINE METABOLITE SCREEN URINE: NOT DETECTED
COLOR: YELLOW
CREAT SERPL-MCNC: 0.7 MG/DL (ref 0.5–1)
EOSINOPHILS ABSOLUTE: 0.15 E9/L (ref 0.05–0.5)
EOSINOPHILS RELATIVE PERCENT: 1.5 % (ref 0–6)
ETHANOL: <10 MG/DL (ref 0–0.08)
GFR AFRICAN AMERICAN: >60
GFR NON-AFRICAN AMERICAN: >60 ML/MIN/1.73
GLUCOSE BLD-MCNC: 95 MG/DL (ref 74–99)
GLUCOSE URINE: NEGATIVE MG/DL
HCT VFR BLD CALC: 43 % (ref 34–48)
HEMOGLOBIN: 13.7 G/DL (ref 11.5–15.5)
IMMATURE GRANULOCYTES #: 0.07 E9/L
IMMATURE GRANULOCYTES %: 0.7 % (ref 0–5)
KETONES, URINE: NEGATIVE MG/DL
LEUKOCYTE ESTERASE, URINE: NEGATIVE
LYMPHOCYTES ABSOLUTE: 2.39 E9/L (ref 1.5–4)
LYMPHOCYTES RELATIVE PERCENT: 23.6 % (ref 20–42)
Lab: NORMAL
MAGNESIUM: 2.2 MG/DL (ref 1.6–2.6)
MCH RBC QN AUTO: 29.1 PG (ref 26–35)
MCHC RBC AUTO-ENTMCNC: 31.9 % (ref 32–34.5)
MCV RBC AUTO: 91.3 FL (ref 80–99.9)
METHADONE SCREEN, URINE: NOT DETECTED
MONOCYTES ABSOLUTE: 0.55 E9/L (ref 0.1–0.95)
MONOCYTES RELATIVE PERCENT: 5.4 % (ref 2–12)
NEUTROPHILS ABSOLUTE: 6.91 E9/L (ref 1.8–7.3)
NEUTROPHILS RELATIVE PERCENT: 68.3 % (ref 43–80)
NITRITE, URINE: NEGATIVE
OPIATE SCREEN URINE: NOT DETECTED
PDW BLD-RTO: 14.8 FL (ref 11.5–15)
PH UA: 6 (ref 5–9)
PHENCYCLIDINE SCREEN URINE: NOT DETECTED
PLATELET # BLD: 366 E9/L (ref 130–450)
PMV BLD AUTO: 9.6 FL (ref 7–12)
POTASSIUM REFLEX MAGNESIUM: 3.5 MMOL/L (ref 3.5–5)
PROPOXYPHENE SCREEN: NOT DETECTED
PROTEIN UA: NEGATIVE MG/DL
RBC # BLD: 4.71 E12/L (ref 3.5–5.5)
SALICYLATE, SERUM: <0.3 MG/DL (ref 0–30)
SODIUM BLD-SCNC: 141 MMOL/L (ref 132–146)
SPECIFIC GRAVITY UA: <=1.005 (ref 1–1.03)
TRICYCLIC ANTIDEPRESSANTS SCREEN SERUM: NEGATIVE NG/ML
UROBILINOGEN, URINE: 0.2 E.U./DL
WBC # BLD: 10.1 E9/L (ref 4.5–11.5)

## 2019-09-18 PROCEDURE — 99285 EMERGENCY DEPT VISIT HI MDM: CPT

## 2019-09-18 PROCEDURE — 36415 COLL VENOUS BLD VENIPUNCTURE: CPT

## 2019-09-18 PROCEDURE — 80048 BASIC METABOLIC PNL TOTAL CA: CPT

## 2019-09-18 PROCEDURE — G0480 DRUG TEST DEF 1-7 CLASSES: HCPCS

## 2019-09-18 PROCEDURE — 81003 URINALYSIS AUTO W/O SCOPE: CPT

## 2019-09-18 PROCEDURE — 83735 ASSAY OF MAGNESIUM: CPT

## 2019-09-18 PROCEDURE — 80307 DRUG TEST PRSMV CHEM ANLYZR: CPT

## 2019-09-18 PROCEDURE — 85025 COMPLETE CBC W/AUTO DIFF WBC: CPT

## 2019-09-19 PROBLEM — F33.2 MAJOR DEPRESSIVE DISORDER, RECURRENT, SEVERE WITHOUT PSYCHOTIC FEATURES (HCC): Status: ACTIVE | Noted: 2019-09-19

## 2019-09-19 PROCEDURE — 1240000000 HC EMOTIONAL WELLNESS R&B

## 2019-09-19 PROCEDURE — 6370000000 HC RX 637 (ALT 250 FOR IP): Performed by: NURSE PRACTITIONER

## 2019-09-19 PROCEDURE — 6370000000 HC RX 637 (ALT 250 FOR IP): Performed by: EMERGENCY MEDICINE

## 2019-09-19 RX ORDER — LEVOTHYROXINE SODIUM 0.1 MG/1
100 TABLET ORAL DAILY
Status: DISCONTINUED | OUTPATIENT
Start: 2019-09-19 | End: 2019-09-23 | Stop reason: HOSPADM

## 2019-09-19 RX ORDER — TOPIRAMATE 100 MG/1
200 TABLET, FILM COATED ORAL 2 TIMES DAILY
Status: DISCONTINUED | OUTPATIENT
Start: 2019-09-19 | End: 2019-09-23 | Stop reason: HOSPADM

## 2019-09-19 RX ORDER — DIPHENHYDRAMINE HCL 25 MG
25 TABLET ORAL ONCE
Status: COMPLETED | OUTPATIENT
Start: 2019-09-19 | End: 2019-09-19

## 2019-09-19 RX ORDER — TRAZODONE HYDROCHLORIDE 50 MG/1
50 TABLET ORAL NIGHTLY PRN
Status: DISCONTINUED | OUTPATIENT
Start: 2019-09-19 | End: 2019-09-23 | Stop reason: HOSPADM

## 2019-09-19 RX ORDER — HYDROXYZINE PAMOATE 50 MG/1
50 CAPSULE ORAL 3 TIMES DAILY PRN
Status: DISCONTINUED | OUTPATIENT
Start: 2019-09-19 | End: 2019-09-19 | Stop reason: CLARIF

## 2019-09-19 RX ORDER — BENZTROPINE MESYLATE 1 MG/ML
2 INJECTION INTRAMUSCULAR; INTRAVENOUS 2 TIMES DAILY PRN
Status: DISCONTINUED | OUTPATIENT
Start: 2019-09-19 | End: 2019-09-23 | Stop reason: HOSPADM

## 2019-09-19 RX ORDER — HYDROXYZINE HYDROCHLORIDE 10 MG/1
50 TABLET, FILM COATED ORAL 3 TIMES DAILY PRN
Status: DISCONTINUED | OUTPATIENT
Start: 2019-09-19 | End: 2019-09-23 | Stop reason: HOSPADM

## 2019-09-19 RX ORDER — VENLAFAXINE HYDROCHLORIDE 150 MG/1
150 CAPSULE, EXTENDED RELEASE ORAL
Status: DISCONTINUED | OUTPATIENT
Start: 2019-09-20 | End: 2019-09-23 | Stop reason: HOSPADM

## 2019-09-19 RX ORDER — TRAZODONE HYDROCHLORIDE 50 MG/1
50 TABLET ORAL NIGHTLY
Status: DISCONTINUED | OUTPATIENT
Start: 2019-09-19 | End: 2019-09-23 | Stop reason: HOSPADM

## 2019-09-19 RX ORDER — PANTOPRAZOLE SODIUM 40 MG/1
40 TABLET, DELAYED RELEASE ORAL
Status: DISCONTINUED | OUTPATIENT
Start: 2019-09-20 | End: 2019-09-23 | Stop reason: HOSPADM

## 2019-09-19 RX ORDER — NICOTINE 21 MG/24HR
1 PATCH, TRANSDERMAL 24 HOURS TRANSDERMAL DAILY
Status: DISCONTINUED | OUTPATIENT
Start: 2019-09-19 | End: 2019-09-23 | Stop reason: HOSPADM

## 2019-09-19 RX ORDER — OLANZAPINE 10 MG/1
10 INJECTION, POWDER, LYOPHILIZED, FOR SOLUTION INTRAMUSCULAR EVERY 4 HOURS PRN
Status: DISCONTINUED | OUTPATIENT
Start: 2019-09-19 | End: 2019-09-23 | Stop reason: HOSPADM

## 2019-09-19 RX ORDER — MAGNESIUM HYDROXIDE/ALUMINUM HYDROXICE/SIMETHICONE 120; 1200; 1200 MG/30ML; MG/30ML; MG/30ML
30 SUSPENSION ORAL PRN
Status: DISCONTINUED | OUTPATIENT
Start: 2019-09-19 | End: 2019-09-23 | Stop reason: HOSPADM

## 2019-09-19 RX ORDER — BENZTROPINE MESYLATE 0.5 MG/1
0.5 TABLET ORAL NIGHTLY
Status: DISCONTINUED | OUTPATIENT
Start: 2019-09-19 | End: 2019-09-23 | Stop reason: HOSPADM

## 2019-09-19 RX ORDER — OLANZAPINE 5 MG/1
5 TABLET ORAL EVERY 4 HOURS PRN
Status: DISCONTINUED | OUTPATIENT
Start: 2019-09-19 | End: 2019-09-23 | Stop reason: HOSPADM

## 2019-09-19 RX ORDER — ACETAMINOPHEN 325 MG/1
650 TABLET ORAL EVERY 4 HOURS PRN
Status: DISCONTINUED | OUTPATIENT
Start: 2019-09-19 | End: 2019-09-23 | Stop reason: HOSPADM

## 2019-09-19 RX ADMIN — BENZTROPINE MESYLATE 0.5 MG: 0.5 TABLET ORAL at 21:13

## 2019-09-19 RX ADMIN — TOPIRAMATE 200 MG: 100 TABLET, FILM COATED ORAL at 21:13

## 2019-09-19 RX ADMIN — TRAZODONE HYDROCHLORIDE 50 MG: 50 TABLET ORAL at 21:13

## 2019-09-19 RX ADMIN — DIPHENHYDRAMINE HCL 25 MG: 25 TABLET ORAL at 04:21

## 2019-09-19 ASSESSMENT — PAIN - FUNCTIONAL ASSESSMENT: PAIN_FUNCTIONAL_ASSESSMENT: 0-10

## 2019-09-19 ASSESSMENT — SLEEP AND FATIGUE QUESTIONNAIRES
DO YOU USE A SLEEP AID: NO
DO YOU HAVE DIFFICULTY SLEEPING: NO
AVERAGE NUMBER OF SLEEP HOURS: 8
DO YOU HAVE DIFFICULTY SLEEPING: NO
AVERAGE NUMBER OF SLEEP HOURS: 8
DO YOU USE A SLEEP AID: NO

## 2019-09-19 ASSESSMENT — PATIENT HEALTH QUESTIONNAIRE - PHQ9
SUM OF ALL RESPONSES TO PHQ QUESTIONS 1-9: 8
SUM OF ALL RESPONSES TO PHQ QUESTIONS 1-9: 7
SUM OF ALL RESPONSES TO PHQ QUESTIONS 1-9: 16

## 2019-09-19 ASSESSMENT — LIFESTYLE VARIABLES
HISTORY_ALCOHOL_USE: NO
HISTORY_ALCOHOL_USE: NO

## 2019-09-19 NOTE — ED NOTES
Patient calm and cooperative. Eating breakfast,sitter present.       Gwendolyn Nieves RN  09/19/19 0076
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
## 2019-09-20 PROCEDURE — G0008 ADMIN INFLUENZA VIRUS VAC: HCPCS | Performed by: NURSE PRACTITIONER

## 2019-09-20 PROCEDURE — 90686 IIV4 VACC NO PRSV 0.5 ML IM: CPT | Performed by: NURSE PRACTITIONER

## 2019-09-20 PROCEDURE — 6370000000 HC RX 637 (ALT 250 FOR IP): Performed by: NURSE PRACTITIONER

## 2019-09-20 PROCEDURE — 1240000000 HC EMOTIONAL WELLNESS R&B

## 2019-09-20 PROCEDURE — 99222 1ST HOSP IP/OBS MODERATE 55: CPT | Performed by: NURSE PRACTITIONER

## 2019-09-20 PROCEDURE — 6360000002 HC RX W HCPCS: Performed by: NURSE PRACTITIONER

## 2019-09-20 RX ORDER — ATORVASTATIN CALCIUM 10 MG/1
20 TABLET, FILM COATED ORAL DAILY
Status: DISCONTINUED | OUTPATIENT
Start: 2019-09-20 | End: 2019-09-23 | Stop reason: HOSPADM

## 2019-09-20 RX ADMIN — BENZTROPINE MESYLATE 0.5 MG: 0.5 TABLET ORAL at 20:37

## 2019-09-20 RX ADMIN — VENLAFAXINE HYDROCHLORIDE 150 MG: 150 CAPSULE, EXTENDED RELEASE ORAL at 08:45

## 2019-09-20 RX ADMIN — TOPIRAMATE 200 MG: 100 TABLET, FILM COATED ORAL at 08:45

## 2019-09-20 RX ADMIN — INFLUENZA A VIRUS A/BRISBANE/02/2018 IVR-190 (H1N1) ANTIGEN (PROPIOLACTONE INACTIVATED), INFLUENZA A VIRUS A/KANSAS/14/2017 X-327 (H3N2) ANTIGEN (PROPIOLACTONE INACTIVATED), INFLUENZA B VIRUS B/MARYLAND/15/2016 ANTIGEN (PROPIOLACTONE INACTIVATED), INFLUENZA B VIRUS B/PHUKET/3073/2013 BVR-1B ANTIGEN (PROPIOLACTONE INACTIVATED) 0.5 ML: 15; 15; 15; 15 INJECTION, SUSPENSION INTRAMUSCULAR at 08:46

## 2019-09-20 RX ADMIN — LEVOTHYROXINE SODIUM 100 MCG: 100 TABLET ORAL at 06:54

## 2019-09-20 RX ADMIN — ATORVASTATIN CALCIUM 20 MG: 10 TABLET, FILM COATED ORAL at 16:07

## 2019-09-20 RX ADMIN — TRAZODONE HYDROCHLORIDE 50 MG: 50 TABLET ORAL at 20:38

## 2019-09-20 RX ADMIN — PANTOPRAZOLE SODIUM 40 MG: 40 TABLET, DELAYED RELEASE ORAL at 06:54

## 2019-09-20 RX ADMIN — TOPIRAMATE 200 MG: 100 TABLET, FILM COATED ORAL at 20:38

## 2019-09-20 ASSESSMENT — PAIN SCALES - GENERAL: PAINLEVEL_OUTOF10: 0

## 2019-09-21 PROCEDURE — 1240000000 HC EMOTIONAL WELLNESS R&B

## 2019-09-21 PROCEDURE — 6370000000 HC RX 637 (ALT 250 FOR IP): Performed by: NURSE PRACTITIONER

## 2019-09-21 PROCEDURE — 99231 SBSQ HOSP IP/OBS SF/LOW 25: CPT | Performed by: NURSE PRACTITIONER

## 2019-09-21 RX ADMIN — VENLAFAXINE HYDROCHLORIDE 150 MG: 150 CAPSULE, EXTENDED RELEASE ORAL at 08:13

## 2019-09-21 RX ADMIN — TOPIRAMATE 200 MG: 100 TABLET, FILM COATED ORAL at 08:13

## 2019-09-21 RX ADMIN — ATORVASTATIN CALCIUM 20 MG: 10 TABLET, FILM COATED ORAL at 08:13

## 2019-09-21 RX ADMIN — TRAZODONE HYDROCHLORIDE 50 MG: 50 TABLET ORAL at 20:51

## 2019-09-21 RX ADMIN — LEVOTHYROXINE SODIUM 100 MCG: 100 TABLET ORAL at 06:56

## 2019-09-21 RX ADMIN — TOPIRAMATE 200 MG: 100 TABLET, FILM COATED ORAL at 20:51

## 2019-09-21 RX ADMIN — PANTOPRAZOLE SODIUM 40 MG: 40 TABLET, DELAYED RELEASE ORAL at 06:56

## 2019-09-21 RX ADMIN — BENZTROPINE MESYLATE 0.5 MG: 0.5 TABLET ORAL at 20:51

## 2019-09-21 RX ADMIN — TRAZODONE HYDROCHLORIDE 50 MG: 50 TABLET ORAL at 23:31

## 2019-09-21 ASSESSMENT — PAIN SCALES - GENERAL
PAINLEVEL_OUTOF10: 0
PAINLEVEL_OUTOF10: 0

## 2019-09-22 PROCEDURE — 1240000000 HC EMOTIONAL WELLNESS R&B

## 2019-09-22 PROCEDURE — 6370000000 HC RX 637 (ALT 250 FOR IP): Performed by: NURSE PRACTITIONER

## 2019-09-22 PROCEDURE — 99231 SBSQ HOSP IP/OBS SF/LOW 25: CPT | Performed by: NURSE PRACTITIONER

## 2019-09-22 RX ADMIN — TOPIRAMATE 200 MG: 100 TABLET, FILM COATED ORAL at 08:38

## 2019-09-22 RX ADMIN — ATORVASTATIN CALCIUM 20 MG: 10 TABLET, FILM COATED ORAL at 08:38

## 2019-09-22 RX ADMIN — PANTOPRAZOLE SODIUM 40 MG: 40 TABLET, DELAYED RELEASE ORAL at 06:49

## 2019-09-22 RX ADMIN — BENZTROPINE MESYLATE 0.5 MG: 0.5 TABLET ORAL at 22:19

## 2019-09-22 RX ADMIN — TRAZODONE HYDROCHLORIDE 50 MG: 50 TABLET ORAL at 22:19

## 2019-09-22 RX ADMIN — VENLAFAXINE HYDROCHLORIDE 150 MG: 150 CAPSULE, EXTENDED RELEASE ORAL at 08:38

## 2019-09-22 RX ADMIN — TOPIRAMATE 200 MG: 100 TABLET, FILM COATED ORAL at 22:19

## 2019-09-22 RX ADMIN — LEVOTHYROXINE SODIUM 100 MCG: 100 TABLET ORAL at 06:49

## 2019-09-22 NOTE — PLAN OF CARE
Denies suicidal ideation, denies homicidal ideation, and denies hallucinations. Pt. States \"I feel better\", affect flat.

## 2019-09-23 VITALS
WEIGHT: 209 LBS | TEMPERATURE: 97.9 F | HEART RATE: 63 BPM | HEIGHT: 61 IN | DIASTOLIC BLOOD PRESSURE: 56 MMHG | OXYGEN SATURATION: 96 % | RESPIRATION RATE: 14 BRPM | BODY MASS INDEX: 39.46 KG/M2 | SYSTOLIC BLOOD PRESSURE: 96 MMHG

## 2019-09-23 PROCEDURE — 6370000000 HC RX 637 (ALT 250 FOR IP): Performed by: NURSE PRACTITIONER

## 2019-09-23 PROCEDURE — 99238 HOSP IP/OBS DSCHRG MGMT 30/<: CPT | Performed by: NURSE PRACTITIONER

## 2019-09-23 RX ORDER — NICOTINE 21 MG/24HR
1 PATCH, TRANSDERMAL 24 HOURS TRANSDERMAL DAILY
Qty: 30 PATCH | Refills: 0 | Status: SHIPPED | OUTPATIENT
Start: 2019-09-24 | End: 2021-06-19

## 2019-09-23 RX ADMIN — VENLAFAXINE HYDROCHLORIDE 150 MG: 150 CAPSULE, EXTENDED RELEASE ORAL at 10:18

## 2019-09-23 RX ADMIN — LEVOTHYROXINE SODIUM 100 MCG: 100 TABLET ORAL at 06:44

## 2019-09-23 RX ADMIN — PANTOPRAZOLE SODIUM 40 MG: 40 TABLET, DELAYED RELEASE ORAL at 06:44

## 2019-09-23 RX ADMIN — ATORVASTATIN CALCIUM 20 MG: 10 TABLET, FILM COATED ORAL at 10:18

## 2019-09-23 RX ADMIN — TOPIRAMATE 200 MG: 100 TABLET, FILM COATED ORAL at 10:18

## 2019-09-23 ASSESSMENT — PAIN SCALES - GENERAL: PAINLEVEL_OUTOF10: 0

## 2019-09-23 NOTE — GROUP NOTE
Group Therapy Note    Date: September 22    Group Start Time: 2000  Group End Time: 2030  Group Topic: Wrap-Up    [facility] ACUTE [facility] 1    Aguila Eng RN        Group Therapy Note    Attended and participated in Wrap-up/Goals Group     Attendees: 15           Signature:  Aguila Eng RN

## 2019-09-23 NOTE — PROGRESS NOTES
Called [facility] to verify last dose of long acting injection, [phone] only open m-f 830am-500 pm.
07/06/2019    LABVLDL 45 08/04/2018    LABVLDL 50 01/30/2018    LABVLDL 47 11/29/2017    LABVLDL 46 09/12/2017    LABVLDL 30 05/16/2017       Madi Vazquez RN
[] Illogical                                        [x] Linear and Goal Directed  [] Tangential  [] Circumstantial      Thought Content:  [x] Denies [] Endorses [] Suicidal [] Homicidal  [] Delusional                                       [] Paranoid  [] Somatic  [] Grandiose     Perception: [x]  None  [] Auditory   [] Visual  [] tactile   [] olfactory  [] Illusions         Insight: [] Intact  [x] Fair  [] Limited    Judgement:  [] Intact  [x] Fair  [] Limited      Assessment/Plan:        Patient Active Problem List   Diagnosis Code    Severe bipolar affective disorder with psychosis ([facility] F31.89    [facility] ([facility] G40.909    Vitamin D deficiency E55.9    Hypothyroidism in adult E03.9    Essential hypertension I10    Mixed hyperlipidemia E78.2    Gastroesophageal reflux disease K21.9    Tobacco use Z72.0    History of suicide attempt Z91.5    Depression F32.9    Suicide attempt ([facility] T14.91XA    Seizures ([facility] R56.9    Depression, major, single episode F32.9    Depressive disorder F32.9    Schizoaffective disorder, bipolar type ([facility] F25.0    Lithium toxicity T56.891A    Depression, acute F32.9    Depression with suicidal ideation F32.9, R45.851    Urge incontinence of urine N39.41    Chronic cough R05    Impacted cerumen of right ear H61.21    Chest pain R07.9    Depression, major, recurrent (HCC) F33.9    PTSD (post-traumatic stress disorder) F43.10    Severe episode of recurrent major depressive disorder, without psychotic features ([facility] F33.2    Major depressive disorder, recurrent, severe without psychotic features ([facility] F33.2         Plan:    []  Patient is refusing medications  [x] Improving as expected   [] Not improving as expected   [] Worsening    []  At Baseline     Continue current treatment  Pos d/c tomorrow    Reason for more than one antipsychotic:  [x] N/A  [] 3 failed monotherapy(drugs tried):  [] Cross over to a new antipsychotic  [] Taper to monotherapy from

## 2019-11-21 ENCOUNTER — HOSPITAL ENCOUNTER (INPATIENT)
Age: 49
LOS: 12 days | Discharge: HOME | DRG: 885 | End: 2019-12-04
Attending: EMERGENCY MEDICINE | Admitting: PSYCHIATRY & NEUROLOGY
Payer: COMMERCIAL

## 2019-11-21 DIAGNOSIS — R45.851 SUICIDAL IDEATION: Primary | ICD-10-CM

## 2019-11-21 PROCEDURE — 99285 EMERGENCY DEPT VISIT HI MDM: CPT

## 2019-11-22 PROBLEM — F32.A ACUTE DEPRESSION: Status: ACTIVE | Noted: 2019-11-22

## 2019-11-22 PROBLEM — F31.9 BIPOLAR 1 DISORDER (HCC): Status: ACTIVE | Noted: 2019-11-22

## 2019-11-22 LAB
ACETAMINOPHEN LEVEL: <5 MCG/ML (ref 10–30)
ALBUMIN SERPL-MCNC: 3.9 G/DL (ref 3.5–5.2)
ALP BLD-CCNC: 140 U/L (ref 35–104)
ALT SERPL-CCNC: 39 U/L (ref 0–32)
AMPHETAMINE SCREEN, URINE: NOT DETECTED
ANION GAP SERPL CALCULATED.3IONS-SCNC: 15 MMOL/L (ref 7–16)
AST SERPL-CCNC: 23 U/L (ref 0–31)
BARBITURATE SCREEN URINE: NOT DETECTED
BENZODIAZEPINE SCREEN, URINE: NOT DETECTED
BILIRUB SERPL-MCNC: 0.2 MG/DL (ref 0–1.2)
BUN BLDV-MCNC: 7 MG/DL (ref 6–20)
CALCIUM SERPL-MCNC: 9.6 MG/DL (ref 8.6–10.2)
CANNABINOID SCREEN URINE: NOT DETECTED
CHLORIDE BLD-SCNC: 103 MMOL/L (ref 98–107)
CO2: 21 MMOL/L (ref 22–29)
COCAINE METABOLITE SCREEN URINE: NOT DETECTED
CREAT SERPL-MCNC: 0.7 MG/DL (ref 0.5–1)
ETHANOL: <10 MG/DL (ref 0–0.08)
FENTANYL SCREEN, URINE: NOT DETECTED
GFR AFRICAN AMERICAN: >60
GFR NON-AFRICAN AMERICAN: >60 ML/MIN/1.73
GLUCOSE BLD-MCNC: 106 MG/DL (ref 74–99)
HCG(URINE) PREGNANCY TEST: NEGATIVE
HCT VFR BLD CALC: 41.6 % (ref 34–48)
HEMOGLOBIN: 12.9 G/DL (ref 11.5–15.5)
Lab: NORMAL
MCH RBC QN AUTO: 28.2 PG (ref 26–35)
MCHC RBC AUTO-ENTMCNC: 31 % (ref 32–34.5)
MCV RBC AUTO: 90.8 FL (ref 80–99.9)
METHADONE SCREEN, URINE: NOT DETECTED
OPIATE SCREEN URINE: NOT DETECTED
OXYCODONE URINE: NOT DETECTED
PDW BLD-RTO: 14.9 FL (ref 11.5–15)
PHENCYCLIDINE SCREEN URINE: NOT DETECTED
PLATELET # BLD: 334 E9/L (ref 130–450)
PMV BLD AUTO: 10.1 FL (ref 7–12)
POTASSIUM SERPL-SCNC: 4 MMOL/L (ref 3.5–5)
RBC # BLD: 4.58 E12/L (ref 3.5–5.5)
SALICYLATE, SERUM: <0.3 MG/DL (ref 0–30)
SODIUM BLD-SCNC: 139 MMOL/L (ref 132–146)
TOTAL PROTEIN: 7.1 G/DL (ref 6.4–8.3)
TRICYCLIC ANTIDEPRESSANTS SCREEN SERUM: NEGATIVE NG/ML
WBC # BLD: 10.7 E9/L (ref 4.5–11.5)

## 2019-11-22 PROCEDURE — 85027 COMPLETE CBC AUTOMATED: CPT

## 2019-11-22 PROCEDURE — 80053 COMPREHEN METABOLIC PANEL: CPT

## 2019-11-22 PROCEDURE — 1240000000 HC EMOTIONAL WELLNESS R&B

## 2019-11-22 PROCEDURE — 81025 URINE PREGNANCY TEST: CPT

## 2019-11-22 PROCEDURE — G0480 DRUG TEST DEF 1-7 CLASSES: HCPCS

## 2019-11-22 PROCEDURE — 36415 COLL VENOUS BLD VENIPUNCTURE: CPT

## 2019-11-22 PROCEDURE — 80307 DRUG TEST PRSMV CHEM ANLYZR: CPT

## 2019-11-22 PROCEDURE — 6370000000 HC RX 637 (ALT 250 FOR IP): Performed by: PSYCHIATRY & NEUROLOGY

## 2019-11-22 RX ORDER — HALOPERIDOL 5 MG
5 TABLET ORAL 2 TIMES DAILY
Status: ON HOLD | COMMUNITY
End: 2019-12-04 | Stop reason: HOSPADM

## 2019-11-22 RX ORDER — ACETAMINOPHEN 325 MG/1
650 TABLET ORAL EVERY 4 HOURS PRN
Status: DISCONTINUED | OUTPATIENT
Start: 2019-11-22 | End: 2019-12-04 | Stop reason: HOSPADM

## 2019-11-22 RX ORDER — TRAZODONE HYDROCHLORIDE 50 MG/1
50 TABLET ORAL NIGHTLY PRN
Status: DISCONTINUED | OUTPATIENT
Start: 2019-11-22 | End: 2019-12-04 | Stop reason: HOSPADM

## 2019-11-22 RX ORDER — POTASSIUM CHLORIDE 750 MG/1
10 CAPSULE, EXTENDED RELEASE ORAL 2 TIMES DAILY WITH MEALS
Status: ON HOLD | COMMUNITY
End: 2021-06-24 | Stop reason: HOSPADM

## 2019-11-22 RX ORDER — OLANZAPINE 10 MG/1
10 INJECTION, POWDER, LYOPHILIZED, FOR SOLUTION INTRAMUSCULAR EVERY 4 HOURS PRN
Status: DISCONTINUED | OUTPATIENT
Start: 2019-11-22 | End: 2019-12-04 | Stop reason: HOSPADM

## 2019-11-22 RX ORDER — BENZTROPINE MESYLATE 1 MG/ML
2 INJECTION INTRAMUSCULAR; INTRAVENOUS 2 TIMES DAILY PRN
Status: DISCONTINUED | OUTPATIENT
Start: 2019-11-22 | End: 2019-12-04 | Stop reason: HOSPADM

## 2019-11-22 RX ORDER — PRAZOSIN HYDROCHLORIDE 2 MG/1
2 CAPSULE ORAL NIGHTLY
Status: ON HOLD | COMMUNITY
End: 2019-12-04 | Stop reason: HOSPADM

## 2019-11-22 RX ORDER — OLANZAPINE 5 MG/1
5 TABLET ORAL EVERY 4 HOURS PRN
Status: DISCONTINUED | OUTPATIENT
Start: 2019-11-22 | End: 2019-12-04 | Stop reason: HOSPADM

## 2019-11-22 RX ORDER — CARVEDILOL 3.12 MG/1
3.12 TABLET ORAL 2 TIMES DAILY
COMMUNITY

## 2019-11-22 RX ORDER — HYDROXYZINE PAMOATE 50 MG/1
50 CAPSULE ORAL 3 TIMES DAILY PRN
Status: DISCONTINUED | OUTPATIENT
Start: 2019-11-22 | End: 2019-11-29

## 2019-11-22 RX ORDER — MAGNESIUM HYDROXIDE/ALUMINUM HYDROXICE/SIMETHICONE 120; 1200; 1200 MG/30ML; MG/30ML; MG/30ML
30 SUSPENSION ORAL PRN
Status: DISCONTINUED | OUTPATIENT
Start: 2019-11-22 | End: 2019-12-04 | Stop reason: HOSPADM

## 2019-11-22 RX ADMIN — TRAZODONE HYDROCHLORIDE 50 MG: 50 TABLET ORAL at 20:23

## 2019-11-22 RX ADMIN — TOPIRAMATE 200 MG: 200 CAPSULE, EXTENDED RELEASE ORAL at 20:23

## 2019-11-22 ASSESSMENT — SLEEP AND FATIGUE QUESTIONNAIRES
DO YOU HAVE DIFFICULTY SLEEPING: YES
DIFFICULTY STAYING ASLEEP: YES
RESTFUL SLEEP: NO
DIFFICULTY FALLING ASLEEP: YES
DIFFICULTY ARISING: NO
SLEEP PATTERN: INSOMNIA
DO YOU USE A SLEEP AID: YES
AVERAGE NUMBER OF SLEEP HOURS: 5

## 2019-11-22 ASSESSMENT — PATIENT HEALTH QUESTIONNAIRE - PHQ9
SUM OF ALL RESPONSES TO PHQ QUESTIONS 1-9: 9
SUM OF ALL RESPONSES TO PHQ QUESTIONS 1-9: 9

## 2019-11-22 ASSESSMENT — PAIN - FUNCTIONAL ASSESSMENT: PAIN_FUNCTIONAL_ASSESSMENT: 0-10

## 2019-11-22 ASSESSMENT — LIFESTYLE VARIABLES: HISTORY_ALCOHOL_USE: NO

## 2019-11-23 PROCEDURE — 6370000000 HC RX 637 (ALT 250 FOR IP): Performed by: NURSE PRACTITIONER

## 2019-11-23 PROCEDURE — 2500000003 HC RX 250 WO HCPCS: Performed by: PSYCHIATRY & NEUROLOGY

## 2019-11-23 PROCEDURE — 1240000000 HC EMOTIONAL WELLNESS R&B

## 2019-11-23 RX ORDER — NICOTINE 21 MG/24HR
1 PATCH, TRANSDERMAL 24 HOURS TRANSDERMAL DAILY
Status: DISCONTINUED | OUTPATIENT
Start: 2019-11-23 | End: 2019-12-04

## 2019-11-23 RX ORDER — PRAZOSIN HYDROCHLORIDE 2 MG/1
2 CAPSULE ORAL NIGHTLY
Status: DISCONTINUED | OUTPATIENT
Start: 2019-11-23 | End: 2019-11-26

## 2019-11-23 RX ORDER — TRAZODONE HYDROCHLORIDE 50 MG/1
50 TABLET ORAL NIGHTLY
Status: DISCONTINUED | OUTPATIENT
Start: 2019-11-23 | End: 2019-12-04 | Stop reason: HOSPADM

## 2019-11-23 RX ORDER — ATORVASTATIN CALCIUM 10 MG/1
20 TABLET, FILM COATED ORAL DAILY
Status: DISCONTINUED | OUTPATIENT
Start: 2019-11-23 | End: 2019-12-04 | Stop reason: HOSPADM

## 2019-11-23 RX ORDER — BENZTROPINE MESYLATE 0.5 MG/1
0.5 TABLET ORAL NIGHTLY
Status: DISCONTINUED | OUTPATIENT
Start: 2019-11-23 | End: 2019-12-04 | Stop reason: HOSPADM

## 2019-11-23 RX ORDER — PANTOPRAZOLE SODIUM 40 MG/1
40 TABLET, DELAYED RELEASE ORAL
Status: DISCONTINUED | OUTPATIENT
Start: 2019-11-24 | End: 2019-12-04 | Stop reason: HOSPADM

## 2019-11-23 RX ORDER — LEVOTHYROXINE SODIUM 0.1 MG/1
100 TABLET ORAL DAILY
Status: DISCONTINUED | OUTPATIENT
Start: 2019-11-23 | End: 2019-12-04 | Stop reason: HOSPADM

## 2019-11-23 RX ORDER — HYDROXYZINE PAMOATE 25 MG/1
25 CAPSULE ORAL 2 TIMES DAILY PRN
Status: DISCONTINUED | OUTPATIENT
Start: 2019-11-23 | End: 2019-11-29 | Stop reason: SDUPTHER

## 2019-11-23 RX ORDER — VENLAFAXINE HYDROCHLORIDE 150 MG/1
150 CAPSULE, EXTENDED RELEASE ORAL
Status: DISCONTINUED | OUTPATIENT
Start: 2019-11-24 | End: 2019-12-04 | Stop reason: HOSPADM

## 2019-11-23 RX ORDER — CARVEDILOL 3.12 MG/1
3.12 TABLET ORAL 2 TIMES DAILY WITH MEALS
Status: DISCONTINUED | OUTPATIENT
Start: 2019-11-23 | End: 2019-12-04 | Stop reason: HOSPADM

## 2019-11-23 RX ORDER — HALOPERIDOL 5 MG
5 TABLET ORAL 2 TIMES DAILY
Status: DISCONTINUED | OUTPATIENT
Start: 2019-11-23 | End: 2019-11-24

## 2019-11-23 RX ORDER — POTASSIUM CHLORIDE 750 MG/1
10 TABLET, EXTENDED RELEASE ORAL 2 TIMES DAILY WITH MEALS
Status: DISCONTINUED | OUTPATIENT
Start: 2019-11-23 | End: 2019-12-04 | Stop reason: HOSPADM

## 2019-11-23 RX ADMIN — POTASSIUM CHLORIDE 10 MEQ: 10 TABLET, EXTENDED RELEASE ORAL at 18:04

## 2019-11-23 RX ADMIN — HALOPERIDOL 5 MG: 5 TABLET ORAL at 21:52

## 2019-11-23 RX ADMIN — CARVEDILOL 3.12 MG: 3.12 TABLET, FILM COATED ORAL at 12:29

## 2019-11-23 RX ADMIN — TRAZODONE HYDROCHLORIDE 50 MG: 50 TABLET ORAL at 21:52

## 2019-11-23 RX ADMIN — CARVEDILOL 3.12 MG: 3.12 TABLET, FILM COATED ORAL at 17:58

## 2019-11-23 RX ADMIN — LEVOTHYROXINE SODIUM 100 MCG: 100 TABLET ORAL at 12:28

## 2019-11-23 RX ADMIN — TOPIRAMATE 200 MG: 200 CAPSULE, EXTENDED RELEASE ORAL at 12:18

## 2019-11-23 RX ADMIN — BENZTROPINE MESYLATE 0.5 MG: 0.5 TABLET ORAL at 21:52

## 2019-11-23 RX ADMIN — TOPIRAMATE 200 MG: 200 CAPSULE, EXTENDED RELEASE ORAL at 21:52

## 2019-11-23 RX ADMIN — ATORVASTATIN CALCIUM 20 MG: 10 TABLET, FILM COATED ORAL at 21:52

## 2019-11-23 RX ADMIN — HALOPERIDOL 5 MG: 5 TABLET ORAL at 12:31

## 2019-11-23 RX ADMIN — PRAZOSIN HYDROCHLORIDE 2 MG: 2 CAPSULE ORAL at 21:52

## 2019-11-23 RX ADMIN — OLANZAPINE 10 MG: 10 INJECTION, POWDER, FOR SOLUTION INTRAMUSCULAR at 18:01

## 2019-11-23 ASSESSMENT — SLEEP AND FATIGUE QUESTIONNAIRES
RESTFUL SLEEP: NO
DIFFICULTY FALLING ASLEEP: NO
DO YOU USE A SLEEP AID: NO
AVERAGE NUMBER OF SLEEP HOURS: 8
SLEEP PATTERN: NORMAL
DO YOU HAVE DIFFICULTY SLEEPING: NO
DIFFICULTY STAYING ASLEEP: NO
DIFFICULTY ARISING: YES

## 2019-11-23 ASSESSMENT — LIFESTYLE VARIABLES: HISTORY_ALCOHOL_USE: NO

## 2019-11-23 ASSESSMENT — PAIN SCALES - GENERAL
PAINLEVEL_OUTOF10: 0
PAINLEVEL_OUTOF10: 0

## 2019-11-23 ASSESSMENT — PATIENT HEALTH QUESTIONNAIRE - PHQ9: SUM OF ALL RESPONSES TO PHQ QUESTIONS 1-9: 26

## 2019-11-24 PROCEDURE — 6370000000 HC RX 637 (ALT 250 FOR IP): Performed by: NURSE PRACTITIONER

## 2019-11-24 PROCEDURE — 1240000000 HC EMOTIONAL WELLNESS R&B

## 2019-11-24 RX ORDER — HALOPERIDOL 5 MG
5 TABLET ORAL 3 TIMES DAILY
Status: DISCONTINUED | OUTPATIENT
Start: 2019-11-24 | End: 2019-11-25

## 2019-11-24 RX ADMIN — CARVEDILOL 3.12 MG: 3.12 TABLET, FILM COATED ORAL at 17:20

## 2019-11-24 RX ADMIN — VENLAFAXINE HYDROCHLORIDE 150 MG: 150 CAPSULE, EXTENDED RELEASE ORAL at 08:53

## 2019-11-24 RX ADMIN — BENZTROPINE MESYLATE 0.5 MG: 0.5 TABLET ORAL at 20:56

## 2019-11-24 RX ADMIN — HALOPERIDOL 5 MG: 5 TABLET ORAL at 08:53

## 2019-11-24 RX ADMIN — PANTOPRAZOLE SODIUM 40 MG: 40 TABLET, DELAYED RELEASE ORAL at 06:39

## 2019-11-24 RX ADMIN — TOPIRAMATE 200 MG: 200 CAPSULE, EXTENDED RELEASE ORAL at 08:55

## 2019-11-24 RX ADMIN — LEVOTHYROXINE SODIUM 100 MCG: 100 TABLET ORAL at 06:39

## 2019-11-24 RX ADMIN — POTASSIUM CHLORIDE 10 MEQ: 10 TABLET, EXTENDED RELEASE ORAL at 17:20

## 2019-11-24 RX ADMIN — ATORVASTATIN CALCIUM 20 MG: 10 TABLET, FILM COATED ORAL at 20:54

## 2019-11-24 RX ADMIN — CARVEDILOL 3.12 MG: 3.12 TABLET, FILM COATED ORAL at 08:53

## 2019-11-24 RX ADMIN — HALOPERIDOL 5 MG: 5 TABLET ORAL at 14:46

## 2019-11-24 RX ADMIN — POTASSIUM CHLORIDE 10 MEQ: 10 TABLET, EXTENDED RELEASE ORAL at 08:53

## 2019-11-24 RX ADMIN — HALOPERIDOL 5 MG: 5 TABLET ORAL at 20:56

## 2019-11-24 ASSESSMENT — PAIN SCALES - GENERAL: PAINLEVEL_OUTOF10: 0

## 2019-11-25 PROCEDURE — 6370000000 HC RX 637 (ALT 250 FOR IP): Performed by: NURSE PRACTITIONER

## 2019-11-25 PROCEDURE — 99232 SBSQ HOSP IP/OBS MODERATE 35: CPT | Performed by: NURSE PRACTITIONER

## 2019-11-25 PROCEDURE — 1240000000 HC EMOTIONAL WELLNESS R&B

## 2019-11-25 RX ORDER — HALOPERIDOL 5 MG
10 TABLET ORAL NIGHTLY
Status: DISCONTINUED | OUTPATIENT
Start: 2019-11-25 | End: 2019-11-29

## 2019-11-25 RX ORDER — HALOPERIDOL 5 MG
5 TABLET ORAL DAILY
Status: DISCONTINUED | OUTPATIENT
Start: 2019-11-26 | End: 2019-11-27

## 2019-11-25 RX ADMIN — CARVEDILOL 3.12 MG: 3.12 TABLET, FILM COATED ORAL at 16:32

## 2019-11-25 RX ADMIN — VENLAFAXINE HYDROCHLORIDE 150 MG: 150 CAPSULE, EXTENDED RELEASE ORAL at 09:26

## 2019-11-25 RX ADMIN — POTASSIUM CHLORIDE 10 MEQ: 10 TABLET, EXTENDED RELEASE ORAL at 09:26

## 2019-11-25 RX ADMIN — TOPIRAMATE 200 MG: 200 CAPSULE, EXTENDED RELEASE ORAL at 20:40

## 2019-11-25 RX ADMIN — HALOPERIDOL 5 MG: 5 TABLET ORAL at 09:26

## 2019-11-25 RX ADMIN — POTASSIUM CHLORIDE 10 MEQ: 10 TABLET, EXTENDED RELEASE ORAL at 16:32

## 2019-11-25 RX ADMIN — ATORVASTATIN CALCIUM 20 MG: 10 TABLET, FILM COATED ORAL at 20:38

## 2019-11-25 RX ADMIN — PANTOPRAZOLE SODIUM 40 MG: 40 TABLET, DELAYED RELEASE ORAL at 06:47

## 2019-11-25 RX ADMIN — TOPIRAMATE 200 MG: 200 CAPSULE, EXTENDED RELEASE ORAL at 09:28

## 2019-11-25 RX ADMIN — TRAZODONE HYDROCHLORIDE 50 MG: 50 TABLET ORAL at 20:38

## 2019-11-25 RX ADMIN — HALOPERIDOL 10 MG: 5 TABLET ORAL at 20:38

## 2019-11-25 RX ADMIN — CARVEDILOL 3.12 MG: 3.12 TABLET, FILM COATED ORAL at 09:26

## 2019-11-25 RX ADMIN — LEVOTHYROXINE SODIUM 100 MCG: 100 TABLET ORAL at 06:47

## 2019-11-25 RX ADMIN — BENZTROPINE MESYLATE 0.5 MG: 0.5 TABLET ORAL at 20:38

## 2019-11-25 ASSESSMENT — PAIN SCALES - GENERAL: PAINLEVEL_OUTOF10: 0

## 2019-11-26 PROCEDURE — 6370000000 HC RX 637 (ALT 250 FOR IP): Performed by: NURSE PRACTITIONER

## 2019-11-26 PROCEDURE — 1240000000 HC EMOTIONAL WELLNESS R&B

## 2019-11-26 PROCEDURE — 99232 SBSQ HOSP IP/OBS MODERATE 35: CPT | Performed by: NURSE PRACTITIONER

## 2019-11-26 RX ORDER — PRAZOSIN HYDROCHLORIDE 1 MG/1
1 CAPSULE ORAL NIGHTLY
Status: DISCONTINUED | OUTPATIENT
Start: 2019-11-26 | End: 2019-12-04 | Stop reason: HOSPADM

## 2019-11-26 RX ADMIN — POTASSIUM CHLORIDE 10 MEQ: 10 TABLET, EXTENDED RELEASE ORAL at 08:52

## 2019-11-26 RX ADMIN — PRAZOSIN HYDROCHLORIDE 1 MG: 1 CAPSULE ORAL at 20:07

## 2019-11-26 RX ADMIN — TOPIRAMATE 200 MG: 200 CAPSULE, EXTENDED RELEASE ORAL at 08:54

## 2019-11-26 RX ADMIN — LEVOTHYROXINE SODIUM 100 MCG: 100 TABLET ORAL at 06:41

## 2019-11-26 RX ADMIN — BENZTROPINE MESYLATE 0.5 MG: 0.5 TABLET ORAL at 20:07

## 2019-11-26 RX ADMIN — CARVEDILOL 3.12 MG: 3.12 TABLET, FILM COATED ORAL at 16:23

## 2019-11-26 RX ADMIN — PANTOPRAZOLE SODIUM 40 MG: 40 TABLET, DELAYED RELEASE ORAL at 06:41

## 2019-11-26 RX ADMIN — POTASSIUM CHLORIDE 10 MEQ: 10 TABLET, EXTENDED RELEASE ORAL at 16:24

## 2019-11-26 RX ADMIN — CARVEDILOL 3.12 MG: 3.12 TABLET, FILM COATED ORAL at 08:52

## 2019-11-26 RX ADMIN — HALOPERIDOL 10 MG: 5 TABLET ORAL at 20:07

## 2019-11-26 RX ADMIN — VENLAFAXINE HYDROCHLORIDE 150 MG: 150 CAPSULE, EXTENDED RELEASE ORAL at 08:52

## 2019-11-26 RX ADMIN — TRAZODONE HYDROCHLORIDE 50 MG: 50 TABLET ORAL at 20:07

## 2019-11-26 RX ADMIN — ATORVASTATIN CALCIUM 20 MG: 10 TABLET, FILM COATED ORAL at 20:07

## 2019-11-26 RX ADMIN — TOPIRAMATE 200 MG: 200 CAPSULE, EXTENDED RELEASE ORAL at 20:09

## 2019-11-26 RX ADMIN — HALOPERIDOL 5 MG: 5 TABLET ORAL at 08:52

## 2019-11-26 ASSESSMENT — PAIN SCALES - GENERAL
PAINLEVEL_OUTOF10: 0
PAINLEVEL_OUTOF10: 0

## 2019-11-27 PROCEDURE — 99232 SBSQ HOSP IP/OBS MODERATE 35: CPT | Performed by: NURSE PRACTITIONER

## 2019-11-27 PROCEDURE — 1240000000 HC EMOTIONAL WELLNESS R&B

## 2019-11-27 PROCEDURE — 6370000000 HC RX 637 (ALT 250 FOR IP): Performed by: NURSE PRACTITIONER

## 2019-11-27 RX ORDER — HALOPERIDOL 5 MG
5 TABLET ORAL 2 TIMES DAILY
Status: DISCONTINUED | OUTPATIENT
Start: 2019-11-27 | End: 2019-11-28

## 2019-11-27 RX ADMIN — CARVEDILOL 3.12 MG: 3.12 TABLET, FILM COATED ORAL at 09:44

## 2019-11-27 RX ADMIN — POTASSIUM CHLORIDE 10 MEQ: 10 TABLET, EXTENDED RELEASE ORAL at 16:54

## 2019-11-27 RX ADMIN — TRAZODONE HYDROCHLORIDE 50 MG: 50 TABLET ORAL at 20:50

## 2019-11-27 RX ADMIN — ATORVASTATIN CALCIUM 20 MG: 10 TABLET, FILM COATED ORAL at 20:51

## 2019-11-27 RX ADMIN — LEVOTHYROXINE SODIUM 100 MCG: 100 TABLET ORAL at 07:09

## 2019-11-27 RX ADMIN — POTASSIUM CHLORIDE 10 MEQ: 10 TABLET, EXTENDED RELEASE ORAL at 09:44

## 2019-11-27 RX ADMIN — HALOPERIDOL 10 MG: 5 TABLET ORAL at 20:51

## 2019-11-27 RX ADMIN — TOPIRAMATE 200 MG: 200 CAPSULE, EXTENDED RELEASE ORAL at 10:37

## 2019-11-27 RX ADMIN — TOPIRAMATE 200 MG: 200 CAPSULE, EXTENDED RELEASE ORAL at 20:50

## 2019-11-27 RX ADMIN — PANTOPRAZOLE SODIUM 40 MG: 40 TABLET, DELAYED RELEASE ORAL at 07:09

## 2019-11-27 RX ADMIN — PRAZOSIN HYDROCHLORIDE 1 MG: 1 CAPSULE ORAL at 20:51

## 2019-11-27 RX ADMIN — BENZTROPINE MESYLATE 0.5 MG: 0.5 TABLET ORAL at 20:50

## 2019-11-27 RX ADMIN — CARVEDILOL 3.12 MG: 3.12 TABLET, FILM COATED ORAL at 16:54

## 2019-11-27 RX ADMIN — VENLAFAXINE HYDROCHLORIDE 150 MG: 150 CAPSULE, EXTENDED RELEASE ORAL at 09:44

## 2019-11-27 RX ADMIN — HALOPERIDOL 5 MG: 5 TABLET ORAL at 16:54

## 2019-11-27 RX ADMIN — HALOPERIDOL 5 MG: 5 TABLET ORAL at 09:44

## 2019-11-27 ASSESSMENT — PAIN SCALES - GENERAL: PAINLEVEL_OUTOF10: 0

## 2019-11-28 PROCEDURE — 6360000002 HC RX W HCPCS: Performed by: PSYCHIATRY & NEUROLOGY

## 2019-11-28 PROCEDURE — 99232 SBSQ HOSP IP/OBS MODERATE 35: CPT | Performed by: NURSE PRACTITIONER

## 2019-11-28 PROCEDURE — 6370000000 HC RX 637 (ALT 250 FOR IP): Performed by: NURSE PRACTITIONER

## 2019-11-28 PROCEDURE — 2500000003 HC RX 250 WO HCPCS: Performed by: PSYCHIATRY & NEUROLOGY

## 2019-11-28 PROCEDURE — 1240000000 HC EMOTIONAL WELLNESS R&B

## 2019-11-28 RX ORDER — HALOPERIDOL 5 MG
10 TABLET ORAL 2 TIMES DAILY
Status: DISCONTINUED | OUTPATIENT
Start: 2019-11-28 | End: 2019-11-29

## 2019-11-28 RX ADMIN — POTASSIUM CHLORIDE 10 MEQ: 10 TABLET, EXTENDED RELEASE ORAL at 08:37

## 2019-11-28 RX ADMIN — POTASSIUM CHLORIDE 10 MEQ: 10 TABLET, EXTENDED RELEASE ORAL at 17:10

## 2019-11-28 RX ADMIN — CARVEDILOL 3.12 MG: 3.12 TABLET, FILM COATED ORAL at 08:36

## 2019-11-28 RX ADMIN — TRAZODONE HYDROCHLORIDE 50 MG: 50 TABLET ORAL at 22:11

## 2019-11-28 RX ADMIN — PRAZOSIN HYDROCHLORIDE 1 MG: 1 CAPSULE ORAL at 20:31

## 2019-11-28 RX ADMIN — ATORVASTATIN CALCIUM 20 MG: 10 TABLET, FILM COATED ORAL at 20:31

## 2019-11-28 RX ADMIN — HALOPERIDOL 5 MG: 5 TABLET ORAL at 08:37

## 2019-11-28 RX ADMIN — VENLAFAXINE HYDROCHLORIDE 150 MG: 150 CAPSULE, EXTENDED RELEASE ORAL at 08:37

## 2019-11-28 RX ADMIN — HALOPERIDOL 10 MG: 5 TABLET ORAL at 17:09

## 2019-11-28 RX ADMIN — PANTOPRAZOLE SODIUM 40 MG: 40 TABLET, DELAYED RELEASE ORAL at 06:53

## 2019-11-28 RX ADMIN — TOPIRAMATE 200 MG: 200 CAPSULE, EXTENDED RELEASE ORAL at 20:33

## 2019-11-28 RX ADMIN — CARVEDILOL 3.12 MG: 3.12 TABLET, FILM COATED ORAL at 17:09

## 2019-11-28 RX ADMIN — HALOPERIDOL 10 MG: 5 TABLET ORAL at 20:31

## 2019-11-28 RX ADMIN — BENZTROPINE MESYLATE 2 MG: 1 INJECTION INTRAMUSCULAR; INTRAVENOUS at 02:07

## 2019-11-28 RX ADMIN — LEVOTHYROXINE SODIUM 100 MCG: 100 TABLET ORAL at 06:53

## 2019-11-28 RX ADMIN — TOPIRAMATE 200 MG: 200 CAPSULE, EXTENDED RELEASE ORAL at 08:37

## 2019-11-28 RX ADMIN — BENZTROPINE MESYLATE 0.5 MG: 0.5 TABLET ORAL at 20:31

## 2019-11-28 RX ADMIN — OLANZAPINE 10 MG: 10 INJECTION, POWDER, FOR SOLUTION INTRAMUSCULAR at 02:06

## 2019-11-28 ASSESSMENT — PAIN SCALES - GENERAL
PAINLEVEL_OUTOF10: 0
PAINLEVEL_OUTOF10: 0

## 2019-11-29 PROCEDURE — 99232 SBSQ HOSP IP/OBS MODERATE 35: CPT | Performed by: NURSE PRACTITIONER

## 2019-11-29 PROCEDURE — 6370000000 HC RX 637 (ALT 250 FOR IP): Performed by: NURSE PRACTITIONER

## 2019-11-29 PROCEDURE — 1240000000 HC EMOTIONAL WELLNESS R&B

## 2019-11-29 RX ORDER — HALOPERIDOL 5 MG
10 TABLET ORAL 3 TIMES DAILY
Status: DISCONTINUED | OUTPATIENT
Start: 2019-11-29 | End: 2019-12-04 | Stop reason: HOSPADM

## 2019-11-29 RX ORDER — HYDROXYZINE PAMOATE 25 MG/1
25 CAPSULE ORAL 3 TIMES DAILY PRN
Status: DISCONTINUED | OUTPATIENT
Start: 2019-11-29 | End: 2019-12-04 | Stop reason: HOSPADM

## 2019-11-29 RX ADMIN — PANTOPRAZOLE SODIUM 40 MG: 40 TABLET, DELAYED RELEASE ORAL at 06:47

## 2019-11-29 RX ADMIN — TOPIRAMATE 200 MG: 200 CAPSULE, EXTENDED RELEASE ORAL at 10:10

## 2019-11-29 RX ADMIN — CARVEDILOL 3.12 MG: 3.12 TABLET, FILM COATED ORAL at 16:47

## 2019-11-29 RX ADMIN — ATORVASTATIN CALCIUM 20 MG: 10 TABLET, FILM COATED ORAL at 20:49

## 2019-11-29 RX ADMIN — POTASSIUM CHLORIDE 10 MEQ: 10 TABLET, EXTENDED RELEASE ORAL at 16:47

## 2019-11-29 RX ADMIN — LEVOTHYROXINE SODIUM 100 MCG: 100 TABLET ORAL at 06:47

## 2019-11-29 RX ADMIN — TOPIRAMATE 200 MG: 200 CAPSULE, EXTENDED RELEASE ORAL at 20:49

## 2019-11-29 RX ADMIN — VENLAFAXINE HYDROCHLORIDE 150 MG: 150 CAPSULE, EXTENDED RELEASE ORAL at 10:05

## 2019-11-29 RX ADMIN — POTASSIUM CHLORIDE 10 MEQ: 10 TABLET, EXTENDED RELEASE ORAL at 10:05

## 2019-11-29 RX ADMIN — PRAZOSIN HYDROCHLORIDE 1 MG: 1 CAPSULE ORAL at 20:49

## 2019-11-29 RX ADMIN — HALOPERIDOL 10 MG: 5 TABLET ORAL at 10:09

## 2019-11-29 RX ADMIN — HALOPERIDOL 10 MG: 5 TABLET ORAL at 20:49

## 2019-11-29 RX ADMIN — TRAZODONE HYDROCHLORIDE 50 MG: 50 TABLET ORAL at 22:01

## 2019-11-29 RX ADMIN — BENZTROPINE MESYLATE 0.5 MG: 0.5 TABLET ORAL at 20:49

## 2019-11-29 RX ADMIN — HALOPERIDOL 10 MG: 5 TABLET ORAL at 13:34

## 2019-11-29 ASSESSMENT — PAIN SCALES - GENERAL: PAINLEVEL_OUTOF10: 0

## 2019-11-30 PROCEDURE — 6370000000 HC RX 637 (ALT 250 FOR IP): Performed by: NURSE PRACTITIONER

## 2019-11-30 PROCEDURE — 1240000000 HC EMOTIONAL WELLNESS R&B

## 2019-11-30 PROCEDURE — 99231 SBSQ HOSP IP/OBS SF/LOW 25: CPT | Performed by: NURSE PRACTITIONER

## 2019-11-30 RX ADMIN — HALOPERIDOL 10 MG: 5 TABLET ORAL at 14:03

## 2019-11-30 RX ADMIN — POTASSIUM CHLORIDE 10 MEQ: 10 TABLET, EXTENDED RELEASE ORAL at 17:04

## 2019-11-30 RX ADMIN — POTASSIUM CHLORIDE 10 MEQ: 10 TABLET, EXTENDED RELEASE ORAL at 10:20

## 2019-11-30 RX ADMIN — TOPIRAMATE 200 MG: 200 CAPSULE, EXTENDED RELEASE ORAL at 10:19

## 2019-11-30 RX ADMIN — BENZTROPINE MESYLATE 0.5 MG: 0.5 TABLET ORAL at 21:34

## 2019-11-30 RX ADMIN — HALOPERIDOL 10 MG: 5 TABLET ORAL at 10:20

## 2019-11-30 RX ADMIN — ATORVASTATIN CALCIUM 20 MG: 10 TABLET, FILM COATED ORAL at 21:34

## 2019-11-30 RX ADMIN — HALOPERIDOL 10 MG: 5 TABLET ORAL at 21:34

## 2019-11-30 RX ADMIN — PANTOPRAZOLE SODIUM 40 MG: 40 TABLET, DELAYED RELEASE ORAL at 06:49

## 2019-11-30 RX ADMIN — PRAZOSIN HYDROCHLORIDE 1 MG: 1 CAPSULE ORAL at 21:35

## 2019-11-30 RX ADMIN — VENLAFAXINE HYDROCHLORIDE 150 MG: 150 CAPSULE, EXTENDED RELEASE ORAL at 10:19

## 2019-11-30 RX ADMIN — CARVEDILOL 3.12 MG: 3.12 TABLET, FILM COATED ORAL at 17:04

## 2019-11-30 RX ADMIN — TRAZODONE HYDROCHLORIDE 50 MG: 50 TABLET ORAL at 23:21

## 2019-11-30 RX ADMIN — TOPIRAMATE 200 MG: 200 CAPSULE, EXTENDED RELEASE ORAL at 21:34

## 2019-11-30 RX ADMIN — LEVOTHYROXINE SODIUM 100 MCG: 100 TABLET ORAL at 06:49

## 2019-12-01 PROCEDURE — 6370000000 HC RX 637 (ALT 250 FOR IP): Performed by: NURSE PRACTITIONER

## 2019-12-01 PROCEDURE — 1240000000 HC EMOTIONAL WELLNESS R&B

## 2019-12-01 PROCEDURE — 99231 SBSQ HOSP IP/OBS SF/LOW 25: CPT | Performed by: NURSE PRACTITIONER

## 2019-12-01 RX ADMIN — CARVEDILOL 3.12 MG: 3.12 TABLET, FILM COATED ORAL at 08:29

## 2019-12-01 RX ADMIN — TRAZODONE HYDROCHLORIDE 50 MG: 50 TABLET ORAL at 21:30

## 2019-12-01 RX ADMIN — HALOPERIDOL 10 MG: 5 TABLET ORAL at 14:41

## 2019-12-01 RX ADMIN — POTASSIUM CHLORIDE 10 MEQ: 10 TABLET, EXTENDED RELEASE ORAL at 08:29

## 2019-12-01 RX ADMIN — PANTOPRAZOLE SODIUM 40 MG: 40 TABLET, DELAYED RELEASE ORAL at 07:27

## 2019-12-01 RX ADMIN — TOPIRAMATE 200 MG: 200 CAPSULE, EXTENDED RELEASE ORAL at 20:52

## 2019-12-01 RX ADMIN — CARVEDILOL 3.12 MG: 3.12 TABLET, FILM COATED ORAL at 17:01

## 2019-12-01 RX ADMIN — HALOPERIDOL 10 MG: 5 TABLET ORAL at 20:52

## 2019-12-01 RX ADMIN — LEVOTHYROXINE SODIUM 100 MCG: 100 TABLET ORAL at 07:27

## 2019-12-01 RX ADMIN — HALOPERIDOL 10 MG: 5 TABLET ORAL at 08:29

## 2019-12-01 RX ADMIN — VENLAFAXINE HYDROCHLORIDE 150 MG: 150 CAPSULE, EXTENDED RELEASE ORAL at 08:29

## 2019-12-01 RX ADMIN — HYDROXYZINE PAMOATE 25 MG: 25 CAPSULE ORAL at 20:52

## 2019-12-01 RX ADMIN — POTASSIUM CHLORIDE 10 MEQ: 10 TABLET, EXTENDED RELEASE ORAL at 17:01

## 2019-12-01 RX ADMIN — TOPIRAMATE 200 MG: 200 CAPSULE, EXTENDED RELEASE ORAL at 08:29

## 2019-12-01 RX ADMIN — ATORVASTATIN CALCIUM 20 MG: 10 TABLET, FILM COATED ORAL at 20:52

## 2019-12-01 RX ADMIN — PRAZOSIN HYDROCHLORIDE 1 MG: 1 CAPSULE ORAL at 20:52

## 2019-12-01 RX ADMIN — BENZTROPINE MESYLATE 0.5 MG: 0.5 TABLET ORAL at 20:52

## 2019-12-02 PROCEDURE — 1240000000 HC EMOTIONAL WELLNESS R&B

## 2019-12-02 PROCEDURE — 6370000000 HC RX 637 (ALT 250 FOR IP): Performed by: NURSE PRACTITIONER

## 2019-12-02 PROCEDURE — 99231 SBSQ HOSP IP/OBS SF/LOW 25: CPT | Performed by: NURSE PRACTITIONER

## 2019-12-02 PROCEDURE — 6370000000 HC RX 637 (ALT 250 FOR IP)

## 2019-12-02 RX ORDER — BENZTROPINE MESYLATE 0.5 MG/1
TABLET ORAL
Status: COMPLETED
Start: 2019-12-02 | End: 2019-12-02

## 2019-12-02 RX ADMIN — LEVOTHYROXINE SODIUM 100 MCG: 100 TABLET ORAL at 07:03

## 2019-12-02 RX ADMIN — BENZTROPINE MESYLATE 0.5 MG: 0.5 TABLET ORAL at 21:22

## 2019-12-02 RX ADMIN — POTASSIUM CHLORIDE 10 MEQ: 10 TABLET, EXTENDED RELEASE ORAL at 09:29

## 2019-12-02 RX ADMIN — PANTOPRAZOLE SODIUM 40 MG: 40 TABLET, DELAYED RELEASE ORAL at 07:03

## 2019-12-02 RX ADMIN — POTASSIUM CHLORIDE 10 MEQ: 10 TABLET, EXTENDED RELEASE ORAL at 17:30

## 2019-12-02 RX ADMIN — HALOPERIDOL 10 MG: 5 TABLET ORAL at 13:14

## 2019-12-02 RX ADMIN — ATORVASTATIN CALCIUM 20 MG: 10 TABLET, FILM COATED ORAL at 20:11

## 2019-12-02 RX ADMIN — CARVEDILOL 3.12 MG: 3.12 TABLET, FILM COATED ORAL at 09:29

## 2019-12-02 RX ADMIN — TOPIRAMATE 200 MG: 200 CAPSULE, EXTENDED RELEASE ORAL at 09:28

## 2019-12-02 RX ADMIN — CARVEDILOL 3.12 MG: 3.12 TABLET, FILM COATED ORAL at 17:30

## 2019-12-02 RX ADMIN — VENLAFAXINE HYDROCHLORIDE 150 MG: 150 CAPSULE, EXTENDED RELEASE ORAL at 09:29

## 2019-12-02 RX ADMIN — HALOPERIDOL 10 MG: 5 TABLET ORAL at 09:29

## 2019-12-02 RX ADMIN — PRAZOSIN HYDROCHLORIDE 1 MG: 1 CAPSULE ORAL at 20:11

## 2019-12-02 RX ADMIN — TOPIRAMATE 200 MG: 200 CAPSULE, EXTENDED RELEASE ORAL at 20:13

## 2019-12-02 RX ADMIN — HALOPERIDOL 10 MG: 5 TABLET ORAL at 20:11

## 2019-12-02 RX ADMIN — TRAZODONE HYDROCHLORIDE 50 MG: 50 TABLET ORAL at 21:23

## 2019-12-03 PROCEDURE — 1240000000 HC EMOTIONAL WELLNESS R&B

## 2019-12-03 PROCEDURE — 6370000000 HC RX 637 (ALT 250 FOR IP): Performed by: NURSE PRACTITIONER

## 2019-12-03 PROCEDURE — 99231 SBSQ HOSP IP/OBS SF/LOW 25: CPT | Performed by: PSYCHIATRY & NEUROLOGY

## 2019-12-03 PROCEDURE — 6370000000 HC RX 637 (ALT 250 FOR IP): Performed by: PSYCHIATRY & NEUROLOGY

## 2019-12-03 RX ADMIN — PRAZOSIN HYDROCHLORIDE 1 MG: 1 CAPSULE ORAL at 22:39

## 2019-12-03 RX ADMIN — POTASSIUM CHLORIDE 10 MEQ: 10 TABLET, EXTENDED RELEASE ORAL at 09:18

## 2019-12-03 RX ADMIN — BENZTROPINE MESYLATE 0.5 MG: 0.5 TABLET ORAL at 22:42

## 2019-12-03 RX ADMIN — POTASSIUM CHLORIDE 10 MEQ: 10 TABLET, EXTENDED RELEASE ORAL at 17:30

## 2019-12-03 RX ADMIN — CARVEDILOL 3.12 MG: 3.12 TABLET, FILM COATED ORAL at 16:49

## 2019-12-03 RX ADMIN — CARVEDILOL 3.12 MG: 3.12 TABLET, FILM COATED ORAL at 09:18

## 2019-12-03 RX ADMIN — HALOPERIDOL 10 MG: 5 TABLET ORAL at 13:55

## 2019-12-03 RX ADMIN — LEVOTHYROXINE SODIUM 100 MCG: 100 TABLET ORAL at 07:02

## 2019-12-03 RX ADMIN — TOPIRAMATE 200 MG: 200 CAPSULE, EXTENDED RELEASE ORAL at 10:18

## 2019-12-03 RX ADMIN — HALOPERIDOL 10 MG: 5 TABLET ORAL at 09:18

## 2019-12-03 RX ADMIN — TRAZODONE HYDROCHLORIDE 50 MG: 50 TABLET ORAL at 22:43

## 2019-12-03 RX ADMIN — HALOPERIDOL 10 MG: 5 TABLET ORAL at 22:40

## 2019-12-03 RX ADMIN — TOPIRAMATE 200 MG: 200 CAPSULE, EXTENDED RELEASE ORAL at 22:42

## 2019-12-03 RX ADMIN — VENLAFAXINE HYDROCHLORIDE 150 MG: 150 CAPSULE, EXTENDED RELEASE ORAL at 09:18

## 2019-12-03 RX ADMIN — PANTOPRAZOLE SODIUM 40 MG: 40 TABLET, DELAYED RELEASE ORAL at 07:02

## 2019-12-03 RX ADMIN — ATORVASTATIN CALCIUM 20 MG: 10 TABLET, FILM COATED ORAL at 22:41

## 2019-12-03 ASSESSMENT — PAIN SCALES - GENERAL: PAINLEVEL_OUTOF10: 0

## 2019-12-04 VITALS
BODY MASS INDEX: 38.21 KG/M2 | DIASTOLIC BLOOD PRESSURE: 76 MMHG | HEIGHT: 61 IN | TEMPERATURE: 96.1 F | HEART RATE: 87 BPM | WEIGHT: 202.38 LBS | RESPIRATION RATE: 12 BRPM | OXYGEN SATURATION: 97 % | SYSTOLIC BLOOD PRESSURE: 137 MMHG

## 2019-12-04 PROCEDURE — 6370000000 HC RX 637 (ALT 250 FOR IP): Performed by: NURSE PRACTITIONER

## 2019-12-04 PROCEDURE — 6370000000 HC RX 637 (ALT 250 FOR IP): Performed by: PSYCHIATRY & NEUROLOGY

## 2019-12-04 PROCEDURE — 99238 HOSP IP/OBS DSCHRG MGMT 30/<: CPT | Performed by: PSYCHIATRY & NEUROLOGY

## 2019-12-04 PROCEDURE — 93005 ELECTROCARDIOGRAM TRACING: CPT | Performed by: NURSE PRACTITIONER

## 2019-12-04 RX ORDER — PRAZOSIN HYDROCHLORIDE 1 MG/1
1 CAPSULE ORAL NIGHTLY
Qty: 30 CAPSULE | Refills: 0 | Status: SHIPPED | OUTPATIENT
Start: 2019-12-04

## 2019-12-04 RX ORDER — HALOPERIDOL 10 MG/1
10 TABLET ORAL 3 TIMES DAILY
Qty: 120 TABLET | Refills: 0 | Status: SHIPPED | OUTPATIENT
Start: 2019-12-04

## 2019-12-04 RX ADMIN — LEVOTHYROXINE SODIUM 100 MCG: 100 TABLET ORAL at 06:37

## 2019-12-04 RX ADMIN — VENLAFAXINE HYDROCHLORIDE 150 MG: 150 CAPSULE, EXTENDED RELEASE ORAL at 09:02

## 2019-12-04 RX ADMIN — HALOPERIDOL 10 MG: 5 TABLET ORAL at 09:02

## 2019-12-04 RX ADMIN — TOPIRAMATE 200 MG: 200 CAPSULE, EXTENDED RELEASE ORAL at 09:02

## 2019-12-04 RX ADMIN — POTASSIUM CHLORIDE 10 MEQ: 10 TABLET, EXTENDED RELEASE ORAL at 09:02

## 2019-12-04 RX ADMIN — PANTOPRAZOLE SODIUM 40 MG: 40 TABLET, DELAYED RELEASE ORAL at 06:37

## 2019-12-04 RX ADMIN — CARVEDILOL 3.12 MG: 3.12 TABLET, FILM COATED ORAL at 09:02

## 2019-12-04 RX ADMIN — HALOPERIDOL 10 MG: 5 TABLET ORAL at 13:43

## 2019-12-04 ASSESSMENT — PAIN SCALES - GENERAL
PAINLEVEL_OUTOF10: 0
PAINLEVEL_OUTOF10: 0

## 2019-12-05 LAB
EKG ATRIAL RATE: 73 BPM
EKG P AXIS: 52 DEGREES
EKG P-R INTERVAL: 162 MS
EKG Q-T INTERVAL: 386 MS
EKG QRS DURATION: 84 MS
EKG QTC CALCULATION (BAZETT): 425 MS
EKG R AXIS: 20 DEGREES
EKG T AXIS: 50 DEGREES
EKG VENTRICULAR RATE: 73 BPM

## 2019-12-05 PROCEDURE — 93010 ELECTROCARDIOGRAM REPORT: CPT | Performed by: INTERNAL MEDICINE

## 2020-10-07 ENCOUNTER — HOSPITAL ENCOUNTER (OUTPATIENT)
Age: 50
Discharge: HOME | End: 2020-10-09
Payer: MEDICARE

## 2020-10-07 ENCOUNTER — HOSPITAL ENCOUNTER (OUTPATIENT)
Dept: GENERAL RADIOLOGY | Age: 50
Discharge: HOME | End: 2020-10-09
Payer: MEDICARE

## 2020-10-07 PROCEDURE — 71046 X-RAY EXAM CHEST 2 VIEWS: CPT

## 2020-11-03 PROBLEM — F32.A DEPRESSIVE DISORDER: Status: RESOLVED | Noted: 2018-02-13 | Resolved: 2020-11-03

## 2020-11-03 PROBLEM — F32.A DEPRESSION: Status: RESOLVED | Noted: 2020-11-03 | Resolved: 2020-11-03

## 2021-05-26 DIAGNOSIS — R20.0 LEG NUMBNESS: Primary | ICD-10-CM

## 2021-05-27 ENCOUNTER — OFFICE VISIT (OUTPATIENT)
Dept: PHYSICAL MEDICINE AND REHAB | Age: 51
End: 2021-05-27
Payer: MEDICARE

## 2021-05-27 VITALS — HEIGHT: 59 IN | BODY MASS INDEX: 40.72 KG/M2 | WEIGHT: 202 LBS

## 2021-05-27 DIAGNOSIS — R20.0 LEG NUMBNESS: ICD-10-CM

## 2021-05-27 DIAGNOSIS — R20.2 PARESTHESIA: Primary | ICD-10-CM

## 2021-05-27 PROCEDURE — 95886 MUSC TEST DONE W/N TEST COMP: CPT | Performed by: PHYSICAL MEDICINE & REHABILITATION

## 2021-05-27 PROCEDURE — G8417 CALC BMI ABV UP PARAM F/U: HCPCS | Performed by: PHYSICAL MEDICINE & REHABILITATION

## 2021-05-27 PROCEDURE — G8428 CUR MEDS NOT DOCUMENT: HCPCS | Performed by: PHYSICAL MEDICINE & REHABILITATION

## 2021-05-27 PROCEDURE — 99202 OFFICE O/P NEW SF 15 MIN: CPT | Performed by: PHYSICAL MEDICINE & REHABILITATION

## 2021-05-27 PROCEDURE — 95910 NRV CNDJ TEST 7-8 STUDIES: CPT | Performed by: PHYSICAL MEDICINE & REHABILITATION

## 2021-05-27 NOTE — PATIENT INSTRUCTIONS
Electrodiagnotic Laboratory  Accredited by the [facility] with Exemplary status  DENISE Kellogg D.O. [facility]  [address] [place]  Phone: [phone]  Fax: [phone]        Today you had an electrodiagnostic exam which included nerve conduction studies (NCS) and electromyography (EMG). This test evaluated the electrical activity of your nerves and muscles to help determine if you have a nerve or muscle disease. This test can help determine the location and type of a nerve or muscle problem. This will help your referring doctor diagnose your condition and determine the appropriate next step in your treatment plan. After your test:    1. There are no long lasting side effects of the test.     2. You may resume your normal activities without restrictions. 3.  Resume any medications that were stopped for the test.     4  If you have sore areas or bruising in your muscles where the needle was placed, apply a cold pack to the sore area for 15-20 minutes three to four times a day as needed for pain. The soreness should go away in about 1-2 days. 5. Your results were provided  Briefly at the end of your test and the final detailed report will be provided to your referring physician, and/or primary care physician and any other parties you requested within 1-2 days of the examination. You may wish to contact your referring provider after a few days to determine what they would like you to do next. 6.  Please call [phone] with any questions or concerns and if you develop increased body temperature/fever, swelling, tenderness, increased pain and/or drainage from the sites where the needle was placed. Thank you for choosing us for your health care needs.

## 2021-05-27 NOTE — PROGRESS NOTES
[address] [facility]  Electrodiagnostic Laboratory  *Accredited by the [address] [place] [address] with exemplary status  [address] [facility] [place]  Phone: [phone]  Fax: [phone]      Date of Examination: 05/27/21  Patient Name: Mar Desai  is a 46y.o. year old female who was seen due to complaints of   Chief Complaint   Patient presents with    Extremity Pain     no pain.  Numbness     numbness in stomach and entire thigh (front and back) 2+ mos of smpy.  Extremity Weakness     both legs feel weaker. Physical Exam: MSK: There is no joint effusion, deformity, instability, swelling, erythema or warmth. AROM is full in the spine and extremities. Neurologic:  No focal sensorimotor deficit. Reflexes 2+ and symmetric. Gait is normal.    Impression:     1. Paresthesia        Plan:   · EMG is indicated to evaluate the above diagnosis. Orders Placed This Encounter   Procedures    OH NEEDLE EMG EA EXTREMTY W/PARASPINL AREA COMPLETE    OH MOTOR &/SENS 7-8 NRV CNDJ PRECONF ELTRODE LIMB     · EMG was done today and showed sensory neuropathy. The patient was educated about the diagnosis and the prognosis. · Advised patient to follow up with referring provider. Thank you for allowing me to participate in the care of your patient.       Sincerely,     Bart Anders DO GEN: Denies fever, chills, sick contacts, recent travel  HEENT: Denies dizziness, blurry vision, HA  Cardiac: Denies CP, SOB, palpitations  Lungs: Denies cough, wheezing  PV: Denies LE swelling  GI: Denies nausea, vomiting, diarrhea, constipation, flank pain  : Denies hematuria, dysuria, frequency, urgency  Skin: Denies rash, redness, open wound  MSK: Denies pain, decreased ROM  Neuro: Denies dizziness, difficulty speaking, difficulty swallowing, numbness/tingling in extremities, loss of bowel/bladder control, weakness in extremities

## 2021-05-27 NOTE — PROGRESS NOTES
[address] [facility]  Electrodiagnostic Laboratory  *Accredited by the [address] [place] [address] with exemplary status  [address] [facility] [place]  Phone: [phone]  Fax: [phone]    Referring Provider: JOSE ANGEL Wise - *  Primary Care Physician: JOSE ANGEL Anderson NP  Patient Name: Scott Herrera  Patient YOB: 1970  Gender: female  BMI: Body mass index is 40.8 kg/m². Height 4' 11\" (1.499 m), weight 202 lb (91.6 kg), last menstrual period 02/13/2014, not currently breastfeeding. 5/27/2021    Description of clinical problem:   Chief Complaint   Patient presents with    Extremity Pain     no pain.  Numbness     numbness in stomach and entire thigh (front and back) 2+ mos of smpy.  Extremity Weakness     both legs feel weaker. Sensory NCS      Nerve / Sites Rec. Site Peak Lat PP Amp Segments Distance Velocity Temp. ms µV  cm m/s °C   L Sural - Ankle (Calf)      Calf Ankle 4.01 3.5* Calf - Ankle 14 45 31.1   L Superficial peroneal - Ankle      Lat leg Ankle 3.44 1.5* Lat leg - Ankle 10 36 31   R Superficial peroneal - Ankle      Lat leg Ankle 3.33 4.0 Lat leg - Ankle 10 38 31   L Lateral femoral cutaneous - Thigh (Inguinal ligament)      A. Ing ligament Thigh 2.29 2.3 A. Ing ligament - Thigh   31   R Lateral femoral cutaneous - Thigh (Inguinal ligament)      A. Ing ligament Thigh 2.03 3.2 A.  Ing ligament - Thigh   31       Motor NCS      Nerve / Sites Muscle Onset Amplitude Segments Distance Velocity Temp.     ms mV  cm m/s °C   L Peroneal - EDB      Ankle EDB 5.47 4.3 Ankle - EDB 8  31      Above Knee EDB 12.45 2.3 Above Knee - Ankle 31 44 31   R Peroneal - EDB      Ankle EDB 4.43 4.7 Ankle - EDB 8  31      Above Knee EDB 10.99 3.0 Above Knee - Ankle 31 47 31   L Tibial - AH      Ankle AH 3.18 9.5 Ankle - AH 8  31.1      Pop fossa AH 12.60 3.4 Pop fossa - Ankle 36 38 31.1       F Wave      Nerve Fmin % F    ms %   L Peroneal - EDB 39.69 100   L Tibial - AH 50.94 100   R Peroneal - EDB 47.45 100       H Reflex      Nerve H Lat    ms   L Tibial - Soleus 32.45   R Tibial - Soleus 31.61       EMG      EMG Summary Table     Spontaneous MUAP Recruitment   Muscle Nerve Roots IA Fib PSW Fasc Amp Dur. PPP Pattern   L. Vastus medialis Femoral L2-L4 N None None None N N N N   L. Vastus lateralis Femoral L2-L4 N None None None N N N N   L. Adductor longus Obturator L2-L4 N None None None N N N N   L. Tibialis anterior Deep peroneal (Fibular) L4-L5 N None None None N N N N   L. Gastrocnemius (Medial head) Tibial S1-S2 N None None None N N N N   R. Vastus medialis Femoral L2-L4 N None None None N N N N   R. Vastus lateralis Femoral L2-L4 N None None None N N N N   R. Adductor longus Obturator L2-L4 N None None None N N N N   R. Tibialis anterior Deep peroneal (Fibular) L4-L5 N None None None N N N N   R. Gastrocnemius (Medial head) Tibial S1-S2 N None None None N N N N   R. Lumbar paraspinals (low)  - N None None None N N N N   R. Lumbar paraspinals (mid)  - N None None None N N N N   L. Lumbar paraspinals (low)  - N None None None N N N N   L. Lumbar paraspinals (mid)  - N None None None N N N N       Study Limitations:  none    Summary of Findings:   Nerve conduction studies:   · The following nerve conduction studies were abnormal:   · Left superficial peroneal sensory response was small. · Left sural sensory response was small. · All other nerve conduction studies listed in the table above were normal in latency, amplitude and conduction velocity. Needle EMG:   · Needle EMG was performed using a concentric needle.  All muscles tested, as listed in the table above demonstrated normal amplitude, duration, phases and recruitment and no active denervation signs were seen. Diagnostic Interpretation: This study was abnormal.     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    · Location: left    · Nature: Gudelia.Aultman  ] Axonal   [  ] Demyelinating  [  ] Mixed axonal and demyelinating     [ X ] Sensory [  ] Motor               [  ] Mixed sensorimotor     [  ] with active denervation       Silvanus.Alias  ] without active denervation  · Duration: Acute  · Severity: mild  · Prognosis: Poor. The prognosis for recovery of axonal lesions is poor and dependant on collateral sprouting and reinnervation. Previous Study: no      Follow up EMG is recommended if clinically warranted. Technologist: Narayan Marques  Physician:    Usha Fagan D.O., P.T. Board Certified Physical Medicine and Rehabilitation  Board Certified Electrodiagnostic Medicine      Nerve conduction studies and electromyography were performed according to our laboratory policies and procedures which can be provided upon request. All abnormal values are identified in the table.  Laboratory normal values can also be provided upon request.       Cc: JOSE ANGEL Leija - *  JOSE ANGEL Harding - NP

## 2021-06-19 ENCOUNTER — APPOINTMENT (OUTPATIENT)
Dept: CT IMAGING | Age: 51
DRG: 641 | End: 2021-06-19
Payer: COMMERCIAL

## 2021-06-19 ENCOUNTER — HOSPITAL ENCOUNTER (INPATIENT)
Age: 51
LOS: 5 days | Discharge: NURSING, REHAB OR LONG-TERM CARE | DRG: 641 | End: 2021-06-24
Attending: EMERGENCY MEDICINE | Admitting: INTERNAL MEDICINE
Payer: COMMERCIAL

## 2021-06-19 ENCOUNTER — APPOINTMENT (OUTPATIENT)
Dept: GENERAL RADIOLOGY | Age: 51
DRG: 641 | End: 2021-06-19
Payer: COMMERCIAL

## 2021-06-19 DIAGNOSIS — E87.6 HYPOKALEMIA: ICD-10-CM

## 2021-06-19 DIAGNOSIS — E83.42 HYPOMAGNESEMIA: ICD-10-CM

## 2021-06-19 DIAGNOSIS — E03.9 HYPOTHYROIDISM IN ADULT: ICD-10-CM

## 2021-06-19 DIAGNOSIS — R27.0 ATAXIA: Primary | ICD-10-CM

## 2021-06-19 DIAGNOSIS — R53.1 GENERAL WEAKNESS: ICD-10-CM

## 2021-06-19 LAB
ALBUMIN SERPL-MCNC: 3.9 G/DL (ref 3.5–5.2)
ALP BLD-CCNC: 120 U/L (ref 35–104)
ALT SERPL-CCNC: 16 U/L (ref 0–32)
ANION GAP SERPL CALCULATED.3IONS-SCNC: 13 MMOL/L (ref 7–16)
AST SERPL-CCNC: 30 U/L (ref 0–31)
BASOPHILS ABSOLUTE: 0.03 E9/L (ref 0–0.2)
BASOPHILS RELATIVE PERCENT: 0.3 % (ref 0–2)
BILIRUB SERPL-MCNC: 0.5 MG/DL (ref 0–1.2)
BUN BLDV-MCNC: 11 MG/DL (ref 6–20)
CALCIUM SERPL-MCNC: 9.8 MG/DL (ref 8.6–10.2)
CHLORIDE BLD-SCNC: 98 MMOL/L (ref 98–107)
CO2: 23 MMOL/L (ref 22–29)
CREAT SERPL-MCNC: 0.6 MG/DL (ref 0.5–1)
EOSINOPHILS ABSOLUTE: 0.03 E9/L (ref 0.05–0.5)
EOSINOPHILS RELATIVE PERCENT: 0.3 % (ref 0–6)
GFR AFRICAN AMERICAN: >60
GFR NON-AFRICAN AMERICAN: >60 ML/MIN/1.73
GLUCOSE BLD-MCNC: 132 MG/DL (ref 74–99)
HCT VFR BLD CALC: 38 % (ref 34–48)
HEMOGLOBIN: 13.4 G/DL (ref 11.5–15.5)
IMMATURE GRANULOCYTES #: 0.03 E9/L
IMMATURE GRANULOCYTES %: 0.3 % (ref 0–5)
LYMPHOCYTES ABSOLUTE: 1.59 E9/L (ref 1.5–4)
LYMPHOCYTES RELATIVE PERCENT: 17 % (ref 20–42)
MAGNESIUM: 1.5 MG/DL (ref 1.6–2.6)
MCH RBC QN AUTO: 34.8 PG (ref 26–35)
MCHC RBC AUTO-ENTMCNC: 35.3 % (ref 32–34.5)
MCV RBC AUTO: 98.7 FL (ref 80–99.9)
MONOCYTES ABSOLUTE: 0.65 E9/L (ref 0.1–0.95)
MONOCYTES RELATIVE PERCENT: 6.9 % (ref 2–12)
NEUTROPHILS ABSOLUTE: 7.03 E9/L (ref 1.8–7.3)
NEUTROPHILS RELATIVE PERCENT: 75.2 % (ref 43–80)
PDW BLD-RTO: 17.8 FL (ref 11.5–15)
PLATELET # BLD: 370 E9/L (ref 130–450)
PMV BLD AUTO: 9.5 FL (ref 7–12)
POTASSIUM REFLEX MAGNESIUM: 2.8 MMOL/L (ref 3.5–5)
RBC # BLD: 3.85 E12/L (ref 3.5–5.5)
SODIUM BLD-SCNC: 134 MMOL/L (ref 132–146)
TOTAL PROTEIN: 7.1 G/DL (ref 6.4–8.3)
WBC # BLD: 9.4 E9/L (ref 4.5–11.5)

## 2021-06-19 PROCEDURE — 70450 CT HEAD/BRAIN W/O DYE: CPT

## 2021-06-19 PROCEDURE — 71045 X-RAY EXAM CHEST 1 VIEW: CPT

## 2021-06-19 PROCEDURE — 85025 COMPLETE CBC W/AUTO DIFF WBC: CPT

## 2021-06-19 PROCEDURE — 83735 ASSAY OF MAGNESIUM: CPT

## 2021-06-19 PROCEDURE — 99285 EMERGENCY DEPT VISIT HI MDM: CPT

## 2021-06-19 PROCEDURE — 80053 COMPREHEN METABOLIC PANEL: CPT

## 2021-06-19 PROCEDURE — 1200000000 HC SEMI PRIVATE

## 2021-06-19 PROCEDURE — 93005 ELECTROCARDIOGRAM TRACING: CPT | Performed by: STUDENT IN AN ORGANIZED HEALTH CARE EDUCATION/TRAINING PROGRAM

## 2021-06-19 PROCEDURE — 96361 HYDRATE IV INFUSION ADD-ON: CPT

## 2021-06-19 PROCEDURE — 6370000000 HC RX 637 (ALT 250 FOR IP): Performed by: STUDENT IN AN ORGANIZED HEALTH CARE EDUCATION/TRAINING PROGRAM

## 2021-06-19 PROCEDURE — 99222 1ST HOSP IP/OBS MODERATE 55: CPT | Performed by: INTERNAL MEDICINE

## 2021-06-19 PROCEDURE — 96360 HYDRATION IV INFUSION INIT: CPT

## 2021-06-19 PROCEDURE — 2580000003 HC RX 258: Performed by: STUDENT IN AN ORGANIZED HEALTH CARE EDUCATION/TRAINING PROGRAM

## 2021-06-19 RX ORDER — 0.9 % SODIUM CHLORIDE 0.9 %
1000 INTRAVENOUS SOLUTION INTRAVENOUS ONCE
Status: COMPLETED | OUTPATIENT
Start: 2021-06-19 | End: 2021-06-20

## 2021-06-19 RX ORDER — MAGNESIUM OXIDE 400 MG/1
400 TABLET ORAL ONCE
Status: COMPLETED | OUTPATIENT
Start: 2021-06-19 | End: 2021-06-19

## 2021-06-19 RX ORDER — 0.9 % SODIUM CHLORIDE 0.9 %
1000 INTRAVENOUS SOLUTION INTRAVENOUS ONCE
Status: COMPLETED | OUTPATIENT
Start: 2021-06-19 | End: 2021-06-19

## 2021-06-19 RX ORDER — FLUOXETINE HYDROCHLORIDE 20 MG/1
30 CAPSULE ORAL DAILY
COMMUNITY

## 2021-06-19 RX ADMIN — SODIUM CHLORIDE 1000 ML: 9 INJECTION, SOLUTION INTRAVENOUS at 20:35

## 2021-06-19 RX ADMIN — MAGNESIUM OXIDE 400 MG: 400 TABLET ORAL at 22:18

## 2021-06-19 RX ADMIN — POTASSIUM BICARBONATE 40 MEQ: 782 TABLET, EFFERVESCENT ORAL at 22:18

## 2021-06-19 RX ADMIN — SODIUM CHLORIDE 1000 ML: 9 INJECTION, SOLUTION INTRAVENOUS at 22:21

## 2021-06-19 ASSESSMENT — ENCOUNTER SYMPTOMS
WHEEZING: 0
BACK PAIN: 0
EYE REDNESS: 0
SORE THROAT: 0
NAUSEA: 0
ABDOMINAL DISTENTION: 0
COUGH: 0
EYE PAIN: 0
SHORTNESS OF BREATH: 0
DIARRHEA: 0
EYE DISCHARGE: 0
VOMITING: 0
SINUS PRESSURE: 0

## 2021-06-19 NOTE — ED NOTES
Bed: 04  Expected date:   Expected time:   Means of arrival:   Comments:  carmelo Vazquez RN  06/19/21 1949

## 2021-06-19 NOTE — ED PROVIDER NOTES
The history is provided by the patient and medical records. 54-year female presents emerged department with complaint of falls and weakness to lower extremities. She has a history of neuropathy she says that she has been weak in the lower extremities of the past 4 months. Been following with her primary care doctor for this but has not been receiving any medication. She states over the past 2 days she has fallen a couple times has not injured anything. Denies hitting her head denies being on blood thinning medication denies any loss of consciousness. She does elicit being a little lightheaded occasionally however she states she falls because she is weak on her legs. She otherwise has no other complaints denies any chest pain shortness of breath abdominal pain change bowel bladder habits, fevers chills cough congestion runny nose. The patient presents with weakness of bilateral lower extremities that has been going on for 2 months moderate. These symptoms are moderate in severity. Symptoms are made better by nothing. Symptoms are made worse by nothing. Associated symptoms include neuropathy, lightheadedness. Review of Systems   Constitutional: Negative for chills and fever. HENT: Negative for ear pain, sinus pressure and sore throat. Eyes: Negative for pain, discharge and redness. Respiratory: Negative for cough, shortness of breath and wheezing. Cardiovascular: Negative for chest pain. Gastrointestinal: Negative for abdominal distention, diarrhea, nausea and vomiting. Genitourinary: Negative for dysuria and frequency. Musculoskeletal: Negative for arthralgias and back pain. Skin: Negative for rash and wound. Neurological: Negative for weakness and headaches. States weakness in lower extremities   Hematological: Negative for adenopathy. All other systems reviewed and are negative. Physical Exam  Vitals and nursing note reviewed.    Constitutional:       Appearance: Bipolar 1 disorder ([facility] Borderline personality disorder in adolescent [facility]), CAD (coronary artery disease), COPD (chronic obstructive pulmonary disease) ([facility] Depression, History of blood transfusion, Hyperlipidemia, Hypertension, PTSD (post-traumatic stress disorder), Schizo affective schizophrenia ([facility] Seizures ([facility] and Thyroid disease. Past Surgical History:  has a past surgical history that includes joint replacement; Cholecystectomy; shoulder surgery; Abdomen surgery; and Endoscopy, colon, diagnostic. Social History:  reports that she has quit smoking. Her smoking use included cigarettes. She has a 14.00 pack-year smoking history. She has never used smokeless tobacco. She reports that she does not drink alcohol and does not use drugs. Family History: family history includes High Blood Pressure in her mother; No Known Problems in her father. The patients home medications have been reviewed.     Allergies: Adhesive tape    -------------------------------------------------- RESULTS -------------------------------------------------    LABS:  Results for orders placed or performed during the hospital encounter of 06/19/21   CBC Auto Differential   Result Value Ref Range    WBC 9.4 4.5 - 11.5 E9/L    RBC 3.85 3.50 - 5.50 E12/L    Hemoglobin 13.4 11.5 - 15.5 g/dL    Hematocrit 38.0 34.0 - 48.0 %    MCV 98.7 80.0 - 99.9 fL    MCH 34.8 26.0 - 35.0 pg    MCHC 35.3 (H) 32.0 - 34.5 %    RDW 17.8 (H) 11.5 - 15.0 fL    Platelets 135 637 - 320 E9/L    MPV 9.5 7.0 - 12.0 fL    Neutrophils % 75.2 43.0 - 80.0 %    Immature Granulocytes % 0.3 0.0 - 5.0 %    Lymphocytes % 17.0 (L) 20.0 - 42.0 %    Monocytes % 6.9 2.0 - 12.0 %    Eosinophils % 0.3 0.0 - 6.0 %    Basophils % 0.3 0.0 - 2.0 %    Neutrophils Absolute 7.03 1.80 - 7.30 E9/L    Immature Granulocytes # 0.03 E9/L    Lymphocytes Absolute 1.59 1.50 - 4.00 E9/L    Monocytes Absolute 0.65 0.10 - 0.95 E9/L    Eosinophils Absolute 0.03 (L) 0.05 - 0.50 E9/L Basophils Absolute 0.03 0.00 - 0.20 E9/L   Comprehensive Metabolic Panel w/ Reflex to MG   Result Value Ref Range    Sodium 134 132 - 146 mmol/L    Potassium reflex Magnesium 2.8 (L) 3.5 - 5.0 mmol/L    Chloride 98 98 - 107 mmol/L    CO2 23 22 - 29 mmol/L    Anion Gap 13 7 - 16 mmol/L    Glucose 132 (H) 74 - 99 mg/dL    BUN 11 6 - 20 mg/dL    CREATININE 0.6 0.5 - 1.0 mg/dL    GFR Non-African American >60 >=60 mL/min/1.73    GFR African American >60     Calcium 9.8 8.6 - 10.2 mg/dL    Total Protein 7.1 6.4 - 8.3 g/dL    Albumin 3.9 3.5 - 5.2 g/dL    Total Bilirubin 0.5 0.0 - 1.2 mg/dL    Alkaline Phosphatase 120 (H) 35 - 104 U/L    ALT 16 0 - 32 U/L    AST 30 0 - 31 U/L   Magnesium   Result Value Ref Range    Magnesium 1.5 (L) 1.6 - 2.6 mg/dL       RADIOLOGY:  XR CHEST 1 VIEW   Final Result   No acute process. CT Head WO Contrast   Final Result   No acute intracranial abnormality. No significant interval change.                 ------------------------- NURSING NOTES AND VITALS REVIEWED ---------------------------  Date / Time Roomed:  6/19/2021  7:49 PM  ED Bed Assignment:  04/04    The nursing notes within the ED encounter and vital signs as below have been reviewed.      Patient Vitals for the past 24 hrs:   BP Temp Temp src Pulse Resp SpO2 Height Weight   06/19/21 2223 (!) 92/58 98 °F (36.7 °C) Oral 69 16 98 % -- --   06/19/21 2134 96/66 -- -- -- -- -- -- --   06/19/21 2122 (!) 71/54 98.3 °F (36.8 °C) Oral 66 16 96 % -- --   06/19/21 1954 -- -- -- -- -- -- 5' 1\" (1.549 m) 171 lb (77.6 kg)   06/19/21 1949 104/80 98 °F (36.7 °C) Oral 84 18 95 % -- --       Oxygen Saturation Interpretation: Normal    ------------------------------------------ PROGRESS NOTES ------------------------------------------  Re-evaluation(s):  Time: 1115  Patients symptoms show no change  Repeat physical examination is not changed    Counseling:  I have spoken with the patient and discussed todays results, in addition to

## 2021-06-20 LAB
EKG ATRIAL RATE: 83 BPM
EKG P AXIS: 61 DEGREES
EKG P-R INTERVAL: 124 MS
EKG Q-T INTERVAL: 426 MS
EKG QRS DURATION: 76 MS
EKG QTC CALCULATION (BAZETT): 500 MS
EKG R AXIS: -3 DEGREES
EKG T AXIS: 67 DEGREES
EKG VENTRICULAR RATE: 83 BPM
MAGNESIUM: 1.4 MG/DL (ref 1.6–2.6)
POTASSIUM SERPL-SCNC: 3.4 MMOL/L (ref 3.5–5)

## 2021-06-20 PROCEDURE — 96361 HYDRATE IV INFUSION ADD-ON: CPT

## 2021-06-20 PROCEDURE — G0378 HOSPITAL OBSERVATION PER HR: HCPCS

## 2021-06-20 PROCEDURE — 6370000000 HC RX 637 (ALT 250 FOR IP): Performed by: INTERNAL MEDICINE

## 2021-06-20 PROCEDURE — 36415 COLL VENOUS BLD VENIPUNCTURE: CPT

## 2021-06-20 PROCEDURE — 2580000003 HC RX 258: Performed by: INTERNAL MEDICINE

## 2021-06-20 PROCEDURE — 6360000002 HC RX W HCPCS: Performed by: INTERNAL MEDICINE

## 2021-06-20 PROCEDURE — 84132 ASSAY OF SERUM POTASSIUM: CPT

## 2021-06-20 PROCEDURE — 83735 ASSAY OF MAGNESIUM: CPT

## 2021-06-20 PROCEDURE — 99232 SBSQ HOSP IP/OBS MODERATE 35: CPT | Performed by: INTERNAL MEDICINE

## 2021-06-20 PROCEDURE — 1200000000 HC SEMI PRIVATE

## 2021-06-20 PROCEDURE — 93010 ELECTROCARDIOGRAM REPORT: CPT | Performed by: INTERNAL MEDICINE

## 2021-06-20 PROCEDURE — 96372 THER/PROPH/DIAG INJ SC/IM: CPT

## 2021-06-20 RX ORDER — OMEPRAZOLE 20 MG/1
40 CAPSULE, DELAYED RELEASE ORAL DAILY
Status: DISCONTINUED | OUTPATIENT
Start: 2021-06-20 | End: 2021-06-20 | Stop reason: CLARIF

## 2021-06-20 RX ORDER — PRAZOSIN HYDROCHLORIDE 1 MG/1
1 CAPSULE ORAL NIGHTLY
Status: DISCONTINUED | OUTPATIENT
Start: 2021-06-20 | End: 2021-06-24 | Stop reason: HOSPADM

## 2021-06-20 RX ORDER — MAGNESIUM OXIDE 400 MG/1
400 TABLET ORAL 2 TIMES DAILY
Status: DISCONTINUED | OUTPATIENT
Start: 2021-06-20 | End: 2021-06-21

## 2021-06-20 RX ORDER — PANTOPRAZOLE SODIUM 40 MG/1
40 TABLET, DELAYED RELEASE ORAL
Status: DISCONTINUED | OUTPATIENT
Start: 2021-06-20 | End: 2021-06-24 | Stop reason: HOSPADM

## 2021-06-20 RX ORDER — POLYETHYLENE GLYCOL 3350 17 G/17G
17 POWDER, FOR SOLUTION ORAL DAILY PRN
Status: DISCONTINUED | OUTPATIENT
Start: 2021-06-20 | End: 2021-06-24 | Stop reason: HOSPADM

## 2021-06-20 RX ORDER — HALOPERIDOL 5 MG
10 TABLET ORAL 3 TIMES DAILY
Status: DISCONTINUED | OUTPATIENT
Start: 2021-06-20 | End: 2021-06-24 | Stop reason: HOSPADM

## 2021-06-20 RX ORDER — POTASSIUM CHLORIDE 20 MEQ/1
40 TABLET, EXTENDED RELEASE ORAL 2 TIMES DAILY
Status: DISCONTINUED | OUTPATIENT
Start: 2021-06-20 | End: 2021-06-21

## 2021-06-20 RX ORDER — SODIUM CHLORIDE 0.9 % (FLUSH) 0.9 %
10 SYRINGE (ML) INJECTION EVERY 12 HOURS SCHEDULED
Status: DISCONTINUED | OUTPATIENT
Start: 2021-06-20 | End: 2021-06-24 | Stop reason: HOSPADM

## 2021-06-20 RX ORDER — TOPIRAMATE 100 MG/1
200 TABLET, FILM COATED ORAL 2 TIMES DAILY
Status: DISCONTINUED | OUTPATIENT
Start: 2021-06-20 | End: 2021-06-24 | Stop reason: HOSPADM

## 2021-06-20 RX ORDER — ACETAMINOPHEN 325 MG/1
650 TABLET ORAL EVERY 6 HOURS PRN
Status: DISCONTINUED | OUTPATIENT
Start: 2021-06-20 | End: 2021-06-24 | Stop reason: HOSPADM

## 2021-06-20 RX ORDER — ATORVASTATIN CALCIUM 20 MG/1
20 TABLET, FILM COATED ORAL DAILY
Status: DISCONTINUED | OUTPATIENT
Start: 2021-06-20 | End: 2021-06-24 | Stop reason: HOSPADM

## 2021-06-20 RX ORDER — SODIUM CHLORIDE 0.9 % (FLUSH) 0.9 %
10 SYRINGE (ML) INJECTION PRN
Status: DISCONTINUED | OUTPATIENT
Start: 2021-06-20 | End: 2021-06-24 | Stop reason: HOSPADM

## 2021-06-20 RX ORDER — PROMETHAZINE HYDROCHLORIDE 25 MG/1
12.5 TABLET ORAL EVERY 6 HOURS PRN
Status: DISCONTINUED | OUTPATIENT
Start: 2021-06-20 | End: 2021-06-24 | Stop reason: HOSPADM

## 2021-06-20 RX ORDER — BENZTROPINE MESYLATE 0.5 MG/1
0.5 TABLET ORAL NIGHTLY
Status: DISCONTINUED | OUTPATIENT
Start: 2021-06-20 | End: 2021-06-24 | Stop reason: HOSPADM

## 2021-06-20 RX ORDER — CARVEDILOL 3.12 MG/1
3.12 TABLET ORAL 2 TIMES DAILY
Status: DISCONTINUED | OUTPATIENT
Start: 2021-06-20 | End: 2021-06-24 | Stop reason: HOSPADM

## 2021-06-20 RX ORDER — TRAZODONE HYDROCHLORIDE 50 MG/1
50 TABLET ORAL NIGHTLY
Status: DISCONTINUED | OUTPATIENT
Start: 2021-06-20 | End: 2021-06-24 | Stop reason: HOSPADM

## 2021-06-20 RX ORDER — ACETAMINOPHEN 650 MG/1
650 SUPPOSITORY RECTAL EVERY 6 HOURS PRN
Status: DISCONTINUED | OUTPATIENT
Start: 2021-06-20 | End: 2021-06-24 | Stop reason: HOSPADM

## 2021-06-20 RX ORDER — SODIUM CHLORIDE 9 MG/ML
25 INJECTION, SOLUTION INTRAVENOUS PRN
Status: DISCONTINUED | OUTPATIENT
Start: 2021-06-20 | End: 2021-06-24 | Stop reason: HOSPADM

## 2021-06-20 RX ORDER — MAGNESIUM SULFATE IN WATER 40 MG/ML
4000 INJECTION, SOLUTION INTRAVENOUS ONCE
Status: COMPLETED | OUTPATIENT
Start: 2021-06-20 | End: 2021-06-21

## 2021-06-20 RX ORDER — ONDANSETRON 2 MG/ML
4 INJECTION INTRAMUSCULAR; INTRAVENOUS EVERY 6 HOURS PRN
Status: DISCONTINUED | OUTPATIENT
Start: 2021-06-20 | End: 2021-06-24 | Stop reason: HOSPADM

## 2021-06-20 RX ORDER — LEVOTHYROXINE SODIUM 0.1 MG/1
100 TABLET ORAL DAILY
Status: DISCONTINUED | OUTPATIENT
Start: 2021-06-20 | End: 2021-06-22

## 2021-06-20 RX ADMIN — LEVOTHYROXINE SODIUM 100 MCG: 0.1 TABLET ORAL at 08:40

## 2021-06-20 RX ADMIN — PANTOPRAZOLE SODIUM 40 MG: 40 TABLET, DELAYED RELEASE ORAL at 07:09

## 2021-06-20 RX ADMIN — FLUOXETINE 30 MG: 20 CAPSULE ORAL at 08:40

## 2021-06-20 RX ADMIN — MAGNESIUM SULFATE HEPTAHYDRATE 4000 MG: 40 INJECTION, SOLUTION INTRAVENOUS at 23:35

## 2021-06-20 RX ADMIN — ENOXAPARIN SODIUM 40 MG: 40 INJECTION SUBCUTANEOUS at 08:40

## 2021-06-20 RX ADMIN — MAGNESIUM OXIDE 400 MG (241.3 MG MAGNESIUM) TABLET 400 MG: TABLET at 21:20

## 2021-06-20 RX ADMIN — HALOPERIDOL 10 MG: 5 TABLET ORAL at 08:40

## 2021-06-20 RX ADMIN — TRAZODONE HYDROCHLORIDE 50 MG: 50 TABLET ORAL at 21:20

## 2021-06-20 RX ADMIN — MAGNESIUM OXIDE 400 MG (241.3 MG MAGNESIUM) TABLET 400 MG: TABLET at 01:45

## 2021-06-20 RX ADMIN — TOPIRAMATE 200 MG: 100 TABLET, FILM COATED ORAL at 21:20

## 2021-06-20 RX ADMIN — POTASSIUM CHLORIDE 40 MEQ: 20 TABLET, EXTENDED RELEASE ORAL at 08:40

## 2021-06-20 RX ADMIN — BENZTROPINE MESYLATE 0.5 MG: 0.5 TABLET ORAL at 21:19

## 2021-06-20 RX ADMIN — Medication 10 ML: at 21:22

## 2021-06-20 RX ADMIN — HALOPERIDOL 10 MG: 5 TABLET ORAL at 21:20

## 2021-06-20 RX ADMIN — HALOPERIDOL 10 MG: 5 TABLET ORAL at 13:47

## 2021-06-20 RX ADMIN — POTASSIUM CHLORIDE 40 MEQ: 20 TABLET, EXTENDED RELEASE ORAL at 21:20

## 2021-06-20 RX ADMIN — POTASSIUM CHLORIDE 40 MEQ: 20 TABLET, EXTENDED RELEASE ORAL at 01:45

## 2021-06-20 RX ADMIN — CARVEDILOL 3.12 MG: 3.12 TABLET, FILM COATED ORAL at 08:40

## 2021-06-20 RX ADMIN — Medication 10 ML: at 08:40

## 2021-06-20 RX ADMIN — ATORVASTATIN CALCIUM 20 MG: 20 TABLET, FILM COATED ORAL at 08:40

## 2021-06-20 RX ADMIN — MAGNESIUM OXIDE 400 MG (241.3 MG MAGNESIUM) TABLET 400 MG: TABLET at 08:40

## 2021-06-20 RX ADMIN — TOPIRAMATE 200 MG: 100 TABLET, FILM COATED ORAL at 08:40

## 2021-06-20 RX ADMIN — CARVEDILOL 3.12 MG: 3.12 TABLET, FILM COATED ORAL at 21:20

## 2021-06-20 ASSESSMENT — PAIN SCALES - GENERAL
PAINLEVEL_OUTOF10: 0
PAINLEVEL_OUTOF10: 0

## 2021-06-20 NOTE — PROGRESS NOTES
[address] [place], [place] [address]ist   Progress Note    Admitting Date and Time: 6/19/2021  7:49 PM  Admit Dx: Generalized weakness [R53.1]    Subjective:    Pt feels better but stats she is real weak. .   Per RN: No labs ordered for today.        potassium chloride  40 mEq Oral BID    magnesium oxide  400 mg Oral BID    sodium chloride flush  10 mL Intravenous 2 times per day    enoxaparin  40 mg Subcutaneous Daily    atorvastatin  20 mg Oral Daily    traZODone  50 mg Oral Nightly    benztropine  0.5 mg Oral Nightly    levothyroxine  100 mcg Oral Daily    topiramate  200 mg Oral BID    carvedilol  3.125 mg Oral BID    prazosin  1 mg Oral Nightly    haloperidol  10 mg Oral TID    FLUoxetine  30 mg Oral Daily    pantoprazole  40 mg Oral QAM AC     sodium chloride flush, 10 mL, PRN  sodium chloride, 25 mL, PRN  promethazine, 12.5 mg, Q6H PRN   Or  ondansetron, 4 mg, Q6H PRN  polyethylene glycol, 17 g, Daily PRN  acetaminophen, 650 mg, Q6H PRN   Or  acetaminophen, 650 mg, Q6H PRN         Objective:    /76   Pulse 77   Temp 98.2 °F (36.8 °C) (Oral)   Resp 16   Ht 5' 1\" (1.549 m)   Wt 171 lb (77.6 kg)   LMP 02/13/2014 (Approximate)   SpO2 98%   BMI 32.31 kg/m²   General Appearance: alert  and in no acute distress  Skin: warm and dry  Head: normocephalic and atraumatic  Eyes: pupils equal, round, and reactive to light, extraocular eye movements intact, conjunctivae normal  Neck: neck supple and non tender without mass   Pulmonary/Chest: clear to auscultation bilaterally- no wheezes, rales or rhonchi, normal air movement, no respiratory distress  Cardiovascular: normal rate, normal S1 and S2 and no carotid bruits  Abdomen: soft, non-tender, non-distended, normal bowel sounds, no masses or organomegaly  Extremities: no cyanosis, no clubbing and no edema  Neurologic: no cranial nerve deficit and speech normal      Recent Labs     06/19/21 2034      K 2.8*   CL 98   CO2 23   BUN 11 CREATININE 0.6   GLUCOSE 132*   CALCIUM 9.8       Recent Labs     06/19/21 2034   ALKPHOS 120*   PROT 7.1   LABALBU 3.9   BILITOT 0.5   AST 30   ALT 16       Recent Labs     06/19/21 2034   WBC 9.4   RBC 3.85   HGB 13.4   HCT 38.0   MCV 98.7   MCH 34.8   MCHC 35.3*   RDW 17.8*      MPV 9.5       Magnesium [[id number]] (Abnormal) Collected: 06/19/21 2034      Updated: 06/19/21 2113      Magnesium 1.5 mg/dL          Radiology:   XR CHEST 1 VIEW   Final Result   No acute process. CT Head WO Contrast   Final Result   No acute intracranial abnormality. No significant interval change. Assessment:  Active Problems:    Generalized weakness  Resolved Problems:    * No resolved hospital problems. *      Plan:    1. Hypokalemia/hypomagnesemia--patient placed on oral Kcl 40 mEq bid and Mg 400 mg bid. I decided to recheck K and Mg this evening and adjust replacement. 2. Generalized weakness--PT/OT consulted. Severe hypokalemia is likely contributing to leg weakness. 3. Seizure disorder--continue home meds. 4. Mood disorder/psych disorder--continue home meds. 5. Hypothyroidism--continue levothryoxine. 6. GERD--continue PPI. NOTE: This report was transcribed using voice recognition software. Every effort was made to ensure accuracy; however, inadvertent computerized transcription errors may be present.      Electronically signed by Tashi Gallegos MD on 6/20/2021 at 7:22 PM

## 2021-06-20 NOTE — H&P
[address] [place], [place] [address]ist Group   HISTORY AND PHYSICAL EXAM      AUTHOR: Rosalba Owens MD PATIENT NAME: Jyoce Belcher   DATE: 2021 MRN: [id number], : 1970   Primary Care Physician: JOSE ANGEL Pace - GUALBERTO     CHIEF COMPLAINT / REASON FOR ADMISSION:  Generalized weakness, Imbalance when walking    HPI:   This is a 46 y.o. female  has a past medical history of Arthritis, Asthma, Bipolar 1 disorder ([facility] Borderline personality disorder in [facility]), CAD (coronary artery disease), COPD (chronic obstructive pulmonary disease) ([facility] Depression, History of blood transfusion, Hyperlipidemia, Hypertension, PTSD (post-traumatic stress disorder), Schizo affective schizophrenia ([facility] Seizures ([facility] and Thyroid disease. presented with Generalized weakness, Imbalance when walking for last few days prior to arrival to the hospital.  Initially weakness was mild, intermittent but gradually getting more persistent. Started gradually. Exacerbated by general activity or exertion. Relieved only partially by rest. The patient was seen and examined at bedside, appears alert and awake with no acute distress and is able to answer simple  questions.  On direct questioning, patient denied any  resting ongoing chest pain, resting SOB, hemoptysis, productive cough, fever, ongoing palpitation, active abdominal pain, hematemesis, rectal bleeding, ignacio, hematuria, any other  and GI complaints and any new focal neuro deficits       ROS:  Pertinent positives and negatives are noted in the HPI, all other systems are reviewed and negative    PMH:  Past Medical History:   Diagnosis Date    Arthritis     Asthma     Bipolar 1 disorder ([facility]     Borderline personality disorder in [facility])     CAD (coronary artery disease)     COPD (chronic obstructive pulmonary disease) ([facility]     Depression     History of blood transfusion     Hyperlipidemia     Hypertension     PTSD (post-traumatic stress disorder)     Schizo affective schizophrenia ([facility]     Seizures ([facility]     Thyroid disease        Surgical History:  Past Surgical History:   Procedure Laterality Date    ABDOMEN SURGERY      CHOLECYSTECTOMY      ENDOSCOPY, COLON, DIAGNOSTIC      JOINT REPLACEMENT      SHOULDER SURGERY         Medications Prior to Admission:    Prior to Admission medications    Medication Sig Start Date End Date Taking? Authorizing Provider   FLUoxetine (PROZAC) 20 MG capsule Take 30 mg by mouth daily   Yes Historical Provider, MD   prazosin (MINIPRESS) 1 MG capsule Take 1 capsule by mouth nightly 12/4/19   [facility] JOSE ANGEL Armstrong CNP   haloperidol (HALDOL) 10 MG tablet Take 1 tablet by mouth 3 times daily 12/4/19   [facility] JOSE ANGEL Armstrong CNP   topiramate ER (TROKENDI XR) 200 MG CP24 Take 200 mg by mouth 2 times daily     Historical Provider, MD   carvedilol (COREG) 3.125 MG tablet Take 3.125 mg by mouth 2 times daily    Historical Provider, MD   potassium chloride (MICRO-K) 10 MEQ extended release capsule Take 10 mEq by mouth 2 times daily (with meals)    Historical Provider, MD   levothyroxine (SYNTHROID) 100 MCG tablet Take 1 tablet by mouth Daily 9/3/19   Maureen Angel DO   traZODone (DESYREL) 50 MG tablet Take 50 mg by mouth nightly     Historical Provider, MD   benztropine (COGENTIN) 0.5 MG tablet Take 0.5 mg by mouth nightly     Historical Provider, MD   atorvastatin (LIPITOR) 20 MG tablet Take 1 tablet by mouth daily 5/17/19   Maureen Angel DO   omeprazole (PRILOSEC) 20 MG delayed release capsule TAKE 1 CAPSULE BY MOUTH DAILY  Patient taking differently: Take 40 mg by mouth daily  2/15/19   Josue Cardona DO   Incontinence Supply Disposable (DEPEND PROTECTION BRIEFS LARGE) [address] [facility] Depends Briefs to use 1-2 times daily for urinary incontinence 8/31/18   Josue Cardona DO       Allergies:    Adhesive tape    Social History:    reports that she has quit smoking.  Her smoking use included cigarettes. She has a 14.00 pack-year smoking history. She has never used smokeless tobacco. She reports that she does not drink alcohol and does not use drugs. Family History:   family history includes High Blood Pressure in her mother; No Known Problems in her father. PHYSICAL EXAM:  Vitals:  BP (!) 92/58   Pulse 69   Temp 98 °F (36.7 °C) (Oral)   Resp 16   Ht 5' 1\" (1.549 m)   Wt 171 lb (77.6 kg)   LMP 02/13/2014 (Approximate)   SpO2 98%   BMI 32.31 kg/m²   GENERAL: No acute distress, Alert and awake, Afebrile, Appears tired and weak otherwise hemodynamically stable at present. HEENT: PERRLA, no icterus. OP clear and no exudates. NECK: Supple  no carotid/ophthalmic bruits, JVD None. RESPIRATORY:  Bilateral equal vesicular breath sound with no wheezing. Lung bases are clear. HEART: No tachycardia at bedside and regular rhythm. Normal S1 and S2, No S3 or S4 is audible. No pulsation, thrills, murmur or friction rubs. ABDOMEN: Soft, nondistended, nontender. No hepatomegaly or splenomegaly. No CVA tenderness on the both sides. Bowel sound is present. EXTREMITIES: All peripheral pulses are present. No calf tenderness or swelling. No pedal edema is present. Delaney Mc NEUROLOGY: Alert and awake. No new focal neuro deficit. Bilateral Pupil is equal and reactive to light. CN-ii-xii otherwise grossly intact. Motor and Sensory: Grossly Intact bilaterally with no new focal signs     LABS:  Recent Labs     06/19/21 2034   WBC 9.4   RBC 3.85   HGB 13.4   HCT 38.0   MCV 98.7   MCH 34.8   MCHC 35.3*   RDW 17.8*      MPV 9.5     Recent Labs     06/19/21 2034      K 2.8*   CL 98   CO2 23   BUN 11   CREATININE 0.6   GLUCOSE 132*   CALCIUM 9.8     No results for input(s): POCGLU in the last 72 hours.   Results for orders placed or performed during the hospital encounter of 06/19/21   CBC Auto Differential   Result Value Ref Range    WBC 9.4 4.5 - 11.5 E9/L    RBC 3.85 3.50 - 5.50 E12/L Hemoglobin 13.4 11.5 - 15.5 g/dL    Hematocrit 38.0 34.0 - 48.0 %    MCV 98.7 80.0 - 99.9 fL    MCH 34.8 26.0 - 35.0 pg    MCHC 35.3 (H) 32.0 - 34.5 %    RDW 17.8 (H) 11.5 - 15.0 fL    Platelets 609 476 - 315 E9/L    MPV 9.5 7.0 - 12.0 fL    Neutrophils % 75.2 43.0 - 80.0 %    Immature Granulocytes % 0.3 0.0 - 5.0 %    Lymphocytes % 17.0 (L) 20.0 - 42.0 %    Monocytes % 6.9 2.0 - 12.0 %    Eosinophils % 0.3 0.0 - 6.0 %    Basophils % 0.3 0.0 - 2.0 %    Neutrophils Absolute 7.03 1.80 - 7.30 E9/L    Immature Granulocytes # 0.03 E9/L    Lymphocytes Absolute 1.59 1.50 - 4.00 E9/L    Monocytes Absolute 0.65 0.10 - 0.95 E9/L    Eosinophils Absolute 0.03 (L) 0.05 - 0.50 E9/L    Basophils Absolute 0.03 0.00 - 0.20 E9/L   Comprehensive Metabolic Panel w/ Reflex to MG   Result Value Ref Range    Sodium 134 132 - 146 mmol/L    Potassium reflex Magnesium 2.8 (L) 3.5 - 5.0 mmol/L    Chloride 98 98 - 107 mmol/L    CO2 23 22 - 29 mmol/L    Anion Gap 13 7 - 16 mmol/L    Glucose 132 (H) 74 - 99 mg/dL    BUN 11 6 - 20 mg/dL    CREATININE 0.6 0.5 - 1.0 mg/dL    GFR Non-African American >60 >=60 mL/min/1.73    GFR African American >60     Calcium 9.8 8.6 - 10.2 mg/dL    Total Protein 7.1 6.4 - 8.3 g/dL    Albumin 3.9 3.5 - 5.2 g/dL    Total Bilirubin 0.5 0.0 - 1.2 mg/dL    Alkaline Phosphatase 120 (H) 35 - 104 U/L    ALT 16 0 - 32 U/L    AST 30 0 - 31 U/L   Magnesium   Result Value Ref Range    Magnesium 1.5 (L) 1.6 - 2.6 mg/dL     ED Course as of Jun 20 0638   Sat Jun 19, 2021   2146 Resting in bed in no acute distress. Blood pressure slightly improved. [MS]   8361 Patient gait is still very ataxic and very unsteady on her feet and was falls with assistance. Patient agreeable to being admitted to this hospital at this time. [CB]   5603 Dr. Barboza Sensing agreeable with admission of the patient at this time.     [CB]      ED Course User Index  [CB] Edel May MD  [MS] Eneida Moreno DO     Radiology: CT Head WO Contrast    Result Date: 6/19/2021  EXAMINATION: CT OF THE HEAD WITHOUT CONTRAST  6/19/2021 8:39 pm TECHNIQUE: CT of the head was performed without the administration of intravenous contrast. Dose modulation, iterative reconstruction, and/or weight based adjustment of the mA/kV was utilized to reduce the radiation dose to as low as reasonably achievable. COMPARISON: September 24, 2018 HISTORY: ORDERING SYSTEM PROVIDED HISTORY: weakness in lower extremities TECHNOLOGIST PROVIDED HISTORY: Reason for exam:->weakness in lower extremities Has a \"code stroke\" or \"stroke alert\" been called? ->No Decision Support Exception - unselect if not a suspected or confirmed emergency medical condition->Emergency Medical Condition (MA) FINDINGS: BRAIN/VENTRICLES: There is no acute intracranial hemorrhage, mass effect or midline shift. No abnormal extra-axial fluid collection. The gray-white differentiation is maintained without evidence of an acute infarct. There is no evidence of hydrocephalus. ORBITS: The visualized portion of the orbits demonstrate no acute abnormality. SINUSES: The visualized paranasal sinuses and mastoid air cells demonstrate no acute abnormality. SOFT TISSUES/SKULL:  No acute abnormality of the visualized skull or soft tissues. No acute intracranial abnormality. No significant interval change. XR CHEST 1 VIEW    Result Date: 6/19/2021  EXAMINATION: ONE XRAY VIEW OF THE CHEST 6/19/2021 8:47 pm COMPARISON: 10/07/2020 HISTORY: ORDERING SYSTEM PROVIDED HISTORY: [facility] TECHNOLOGIST PROVIDED HISTORY: Reason for exam:->wekaness FINDINGS: The lungs are without acute focal process. There is no effusion or pneumothorax. The cardiomediastinal silhouette is without acute process. The osseous structures are without acute process. No acute process.      ASSESSMENT:    Present on Admission:   Generalized weakness    PLAN:  # Hypokalemia + Hypomagnesemia with generalized weakness and ADL issue due to weakness   CT brain is

## 2021-06-21 PROBLEM — E87.6 HYPOKALEMIA: Status: ACTIVE | Noted: 2021-06-21

## 2021-06-21 PROBLEM — E83.42 HYPOMAGNESEMIA: Status: ACTIVE | Noted: 2021-06-21

## 2021-06-21 LAB
ALBUMIN SERPL-MCNC: 3.4 G/DL (ref 3.5–5.2)
ALP BLD-CCNC: 102 U/L (ref 35–104)
ALT SERPL-CCNC: 17 U/L (ref 0–32)
ANION GAP SERPL CALCULATED.3IONS-SCNC: 10 MMOL/L (ref 7–16)
AST SERPL-CCNC: 24 U/L (ref 0–31)
BASOPHILS ABSOLUTE: 0.02 E9/L (ref 0–0.2)
BASOPHILS RELATIVE PERCENT: 0.3 % (ref 0–2)
BILIRUB SERPL-MCNC: 0.4 MG/DL (ref 0–1.2)
BUN BLDV-MCNC: 4 MG/DL (ref 6–20)
CALCIUM SERPL-MCNC: 8.8 MG/DL (ref 8.6–10.2)
CHLORIDE BLD-SCNC: 104 MMOL/L (ref 98–107)
CO2: 22 MMOL/L (ref 22–29)
CREAT SERPL-MCNC: 0.5 MG/DL (ref 0.5–1)
EOSINOPHILS ABSOLUTE: 0.05 E9/L (ref 0.05–0.5)
EOSINOPHILS RELATIVE PERCENT: 0.7 % (ref 0–6)
GFR AFRICAN AMERICAN: >60
GFR NON-AFRICAN AMERICAN: >60 ML/MIN/1.73
GLUCOSE BLD-MCNC: 120 MG/DL (ref 74–99)
HCT VFR BLD CALC: 35.6 % (ref 34–48)
HEMOGLOBIN: 12.1 G/DL (ref 11.5–15.5)
IMMATURE GRANULOCYTES #: 0.04 E9/L
IMMATURE GRANULOCYTES %: 0.6 % (ref 0–5)
LYMPHOCYTES ABSOLUTE: 1.76 E9/L (ref 1.5–4)
LYMPHOCYTES RELATIVE PERCENT: 25.1 % (ref 20–42)
MAGNESIUM: 3.2 MG/DL (ref 1.6–2.6)
MCH RBC QN AUTO: 34.1 PG (ref 26–35)
MCHC RBC AUTO-ENTMCNC: 34 % (ref 32–34.5)
MCV RBC AUTO: 100.3 FL (ref 80–99.9)
MONOCYTES ABSOLUTE: 0.51 E9/L (ref 0.1–0.95)
MONOCYTES RELATIVE PERCENT: 7.3 % (ref 2–12)
NEUTROPHILS ABSOLUTE: 4.62 E9/L (ref 1.8–7.3)
NEUTROPHILS RELATIVE PERCENT: 66 % (ref 43–80)
PDW BLD-RTO: 17.6 FL (ref 11.5–15)
PLATELET # BLD: 339 E9/L (ref 130–450)
PMV BLD AUTO: 9.7 FL (ref 7–12)
POTASSIUM SERPL-SCNC: 4.2 MMOL/L (ref 3.5–5)
RBC # BLD: 3.55 E12/L (ref 3.5–5.5)
SODIUM BLD-SCNC: 136 MMOL/L (ref 132–146)
TOTAL PROTEIN: 6.3 G/DL (ref 6.4–8.3)
WBC # BLD: 7 E9/L (ref 4.5–11.5)

## 2021-06-21 PROCEDURE — 99232 SBSQ HOSP IP/OBS MODERATE 35: CPT | Performed by: INTERNAL MEDICINE

## 2021-06-21 PROCEDURE — G0378 HOSPITAL OBSERVATION PER HR: HCPCS

## 2021-06-21 PROCEDURE — 2580000003 HC RX 258: Performed by: INTERNAL MEDICINE

## 2021-06-21 PROCEDURE — 6370000000 HC RX 637 (ALT 250 FOR IP): Performed by: INTERNAL MEDICINE

## 2021-06-21 PROCEDURE — 1200000000 HC SEMI PRIVATE

## 2021-06-21 PROCEDURE — 85025 COMPLETE CBC W/AUTO DIFF WBC: CPT

## 2021-06-21 PROCEDURE — 97110 THERAPEUTIC EXERCISES: CPT

## 2021-06-21 PROCEDURE — 36415 COLL VENOUS BLD VENIPUNCTURE: CPT

## 2021-06-21 PROCEDURE — 97161 PT EVAL LOW COMPLEX 20 MIN: CPT

## 2021-06-21 PROCEDURE — 96372 THER/PROPH/DIAG INJ SC/IM: CPT

## 2021-06-21 PROCEDURE — 80053 COMPREHEN METABOLIC PANEL: CPT

## 2021-06-21 PROCEDURE — 6360000002 HC RX W HCPCS: Performed by: INTERNAL MEDICINE

## 2021-06-21 PROCEDURE — 83735 ASSAY OF MAGNESIUM: CPT

## 2021-06-21 RX ORDER — POTASSIUM CHLORIDE 20 MEQ/1
40 TABLET, EXTENDED RELEASE ORAL DAILY
Status: DISCONTINUED | OUTPATIENT
Start: 2021-06-22 | End: 2021-06-24 | Stop reason: HOSPADM

## 2021-06-21 RX ADMIN — ATORVASTATIN CALCIUM 20 MG: 20 TABLET, FILM COATED ORAL at 11:46

## 2021-06-21 RX ADMIN — MAGNESIUM OXIDE 400 MG (241.3 MG MAGNESIUM) TABLET 400 MG: TABLET at 11:48

## 2021-06-21 RX ADMIN — ENOXAPARIN SODIUM 40 MG: 40 INJECTION SUBCUTANEOUS at 11:47

## 2021-06-21 RX ADMIN — TOPIRAMATE 200 MG: 100 TABLET, FILM COATED ORAL at 11:48

## 2021-06-21 RX ADMIN — HALOPERIDOL 10 MG: 5 TABLET ORAL at 21:02

## 2021-06-21 RX ADMIN — Medication 10 ML: at 21:02

## 2021-06-21 RX ADMIN — HALOPERIDOL 10 MG: 5 TABLET ORAL at 11:47

## 2021-06-21 RX ADMIN — BENZTROPINE MESYLATE 0.5 MG: 0.5 TABLET ORAL at 21:01

## 2021-06-21 RX ADMIN — PANTOPRAZOLE SODIUM 40 MG: 40 TABLET, DELAYED RELEASE ORAL at 05:21

## 2021-06-21 RX ADMIN — TOPIRAMATE 200 MG: 100 TABLET, FILM COATED ORAL at 21:00

## 2021-06-21 RX ADMIN — Medication 10 ML: at 11:58

## 2021-06-21 RX ADMIN — CARVEDILOL 3.12 MG: 3.12 TABLET, FILM COATED ORAL at 21:00

## 2021-06-21 RX ADMIN — FLUOXETINE 30 MG: 20 CAPSULE ORAL at 11:47

## 2021-06-21 RX ADMIN — HALOPERIDOL 10 MG: 5 TABLET ORAL at 17:15

## 2021-06-21 RX ADMIN — POTASSIUM CHLORIDE 40 MEQ: 20 TABLET, EXTENDED RELEASE ORAL at 11:48

## 2021-06-21 RX ADMIN — LEVOTHYROXINE SODIUM 100 MCG: 0.1 TABLET ORAL at 05:21

## 2021-06-21 RX ADMIN — CARVEDILOL 3.12 MG: 3.12 TABLET, FILM COATED ORAL at 11:47

## 2021-06-21 RX ADMIN — PRAZOSIN HYDROCHLORIDE 1 MG: 1 CAPSULE ORAL at 21:01

## 2021-06-21 RX ADMIN — TRAZODONE HYDROCHLORIDE 50 MG: 50 TABLET ORAL at 21:00

## 2021-06-21 ASSESSMENT — PAIN SCALES - GENERAL
PAINLEVEL_OUTOF10: 0
PAINLEVEL_OUTOF10: 0

## 2021-06-21 NOTE — CARE COORDINATION
6/21/2021: SS Note/Discharge plan:  SS Consult noted for d/c planning, per chart review and nursing report pt presented from a group home with generalized weakness, PT/OT ordered, pt has multiple psychiatric diagnoses, no LG, pt is her own authorized representative. Met with pt, explained sw role for transition of care and reviewed consult and therapy recommendations for [facility] vs [facility], pt currently a&o and pleasant, pt does prefer arrangements be made for temporary SNF placement prior to going back to the group home, has been at a facility in the past but was in [facility] where she was originally from but reports no local placements and she now prefers placement in [facility].. Pt reviewed SNF list and prefers [facility], referral made to [facility] admissions liaison, awaiting chart review and acceptance, sw will follow to confirm d/c plan and transfer arrangements. Electronically signed by MARK Aleman on 6/21/2021 at 3:29 PM

## 2021-06-21 NOTE — PROGRESS NOTES
[address] [place], [place] [address]ist   Progress Note    Admitting Date and Time: 6/19/2021  7:49 PM  Admit Dx: Generalized weakness [R53.1]    Subjective/interval history:    Pt feels better overall, but still has some weakness and soreness in her legs. Potassium within normal limits today, magnesium slightly elevated.      [START ON 6/22/2021] potassium chloride  40 mEq Oral Daily    sodium chloride flush  10 mL Intravenous 2 times per day    enoxaparin  40 mg Subcutaneous Daily    atorvastatin  20 mg Oral Daily    traZODone  50 mg Oral Nightly    benztropine  0.5 mg Oral Nightly    levothyroxine  100 mcg Oral Daily    topiramate  200 mg Oral BID    carvedilol  3.125 mg Oral BID    prazosin  1 mg Oral Nightly    haloperidol  10 mg Oral TID    FLUoxetine  30 mg Oral Daily    pantoprazole  40 mg Oral QAM AC     sodium chloride flush, 10 mL, PRN  sodium chloride, 25 mL, PRN  promethazine, 12.5 mg, Q6H PRN   Or  ondansetron, 4 mg, Q6H PRN  polyethylene glycol, 17 g, Daily PRN  acetaminophen, 650 mg, Q6H PRN   Or  acetaminophen, 650 mg, Q6H PRN         Objective:    BP 91/60   Pulse 74   Temp 98.4 °F (36.9 °C)   Resp 24   Ht 5' 1\" (1.549 m)   Wt 171 lb (77.6 kg)   LMP 02/13/2014 (Approximate)   SpO2 96%   BMI 32.31 kg/m²   General Appearance: alert  and in no acute distress  Skin: warm and dry  Head: normocephalic and atraumatic  Eyes: pupils equal, round, and reactive to light, extraocular eye movements intact, conjunctivae normal  Neck: neck supple and non tender without mass   Pulmonary/Chest: clear to auscultation bilaterally- no wheezes, rales or rhonchi, normal air movement, no respiratory distress  Cardiovascular: normal rate, normal S1 and S2 and no carotid bruits  Abdomen: soft, non-tender, non-distended, normal bowel sounds, no masses or organomegaly  Extremities: no cyanosis, no clubbing and no edema  Neurologic: no cranial nerve deficit and speech normal      Recent Labs 06/19/21 2034 06/20/21 1947 06/21/21 0455     --  136   K 2.8* 3.4* 4.2   CL 98  --  104   CO2 23  --  22   BUN 11  --  4*   CREATININE 0.6  --  0.5   GLUCOSE 132*  --  120*   CALCIUM 9.8  --  8.8       Recent Labs     06/19/21 2034 06/21/21 0455   ALKPHOS 120* 102   PROT 7.1 6.3*   LABALBU 3.9 3.4*   BILITOT 0.5 0.4   AST 30 24   ALT 16 17       Recent Labs     06/19/21 2034 06/21/21 0455   WBC 9.4 7.0   RBC 3.85 3.55   HGB 13.4 12.1   HCT 38.0 35.6   MCV 98.7 100.3*   MCH 34.8 34.1   MCHC 35.3* 34.0   RDW 17.8* 17.6*    339   MPV 9.5 9.7       Magnesium [[id number]] (Abnormal) Collected: 06/19/21 2034      Updated: 06/19/21 2113      Magnesium 1.5 mg/dL          Radiology:   XR CHEST 1 VIEW   Final Result   No acute process. CT Head WO Contrast   Final Result   No acute intracranial abnormality. No significant interval change. Assessment:  Principal Problem:    Generalized weakness  Active Problems:    Hypothyroidism in adult    Gastroesophageal reflux disease    Hypokalemia    Hypomagnesemia  Resolved Problems:    * No resolved hospital problems. *      Plan:    1. Hypokalemia/hypomagnesemia--patient placed on oral Kcl 40 mEq bid and Mg 400 mg bid. I decided to recheck K and Mg this evening and adjust replacement. Discontinue oral magnesium replacement for now. Decrease potassium chloride to 40 mEq daily starting tomorrow. 2. Generalized weakness--PT/OT consulted. Severe hypokalemia is likely contributing to leg weakness. 3. Seizure disorder--continue home meds. 4. Mood disorder/psych disorder--continue home meds. 5. Hypothyroidism--continue levothryoxine. Check TSH   6. GERD--continue PPI. Disposition: Possible discharge to subacute rehab tomorrow    NOTE: This report was transcribed using voice recognition software. Every effort was made to ensure accuracy; however, inadvertent computerized transcription errors may be present.      Electronically signed by Jennifer Menendez DO on 6/21/2021 at 8:03 PM

## 2021-06-21 NOTE — PROGRESS NOTES
very unsteady and bilateral knee buckling noted. During exercises, patient presents with decreased eccentric control. The patient will benefit from continued skilled therapy to increase strength and improve balance for safe functional mobility, to decrease risk of falls, and to meet goals at discharge. PHYSICAL THERAPY  PLAN OF CARE       Physical therapy plan of care is established based on physician order,  patient diagnosis and clinical assessment    Current Treatment Recommendations:    -Bed Mobility: Lower extremity exercises   -Sitting Balance: Incorporate reaching activities to activate trunk muscles   -Standing Balance: Perform strengthening exercises in standing to promote motor control with or without upper extremity support   -Transfers: Provide instruction on proper hand and foot position for adequate transfer of weight onto lower extremities and use of gait device  -Gait: Gait training and Standing activities to improve: base of support, weight shift, weight bearing    -Stairs: Stair training with instruction on proper technique and hand placement on rail    PT long term treatment goals are located in below grid    Patient and or family understand(s) diagnosis, prognosis, and plan of care. Frequency of treatments: Patient will be seen  daily.          Prior Level of Function: Patient ambulated independently   Rehab Potential: good  for baseline    Past medical history:   Past Medical History:   Diagnosis Date    Arthritis     Asthma     Bipolar 1 disorder ([facility]     Borderline personality disorder in adolescent [facility])     CAD (coronary artery disease)     COPD (chronic obstructive pulmonary disease) ([facility]     Depression     History of blood transfusion     Hyperlipidemia     Hypertension     PTSD (post-traumatic stress disorder)     Schizo affective schizophrenia ([facility]     Seizures ([facility]     Thyroid disease      Past Surgical History:   Procedure Laterality Date    ABDOMEN SURGERY  CHOLECYSTECTOMY      ENDOSCOPY, COLON, DIAGNOSTIC      JOINT REPLACEMENT      SHOULDER SURGERY         SUBJECTIVE:    Precautions: Up with assistance, falls and alarm , bilateral knee buckling, 3 falls in the last week at home  Social history: Patient lives in a group home on second floor (10 steps with HR) with 3 steps  to enter with [address] in shower     Equipment owned: None,      [facility]   How much difficulty turning over in bed?: None  How much difficulty sitting down on / standing up from a chair with arms?: A Little  How much difficulty moving from lying on back to sitting on side of bed?: A Little  How much help from another person moving to and from a bed to a chair?: A Lot  How much help from another person needed to walk in hospital room?: A Lot  How much help from another person for climbing 3-5 steps with a railing?: Total  AM-PAC Inpatient Mobility Raw Score : 15  AM-PAC Inpatient T-Scale Score : 39.45  Mobility Inpatient CMS 0-100% Score: 57.7  Mobility Inpatient CMS G-Code Modifier : CK    Nursing cleared patient for PT evaluation. The admitting diagnosis and active problem list as listed above have been reviewed prior to the initiation of this evaluation. OBJECTIVE;   Initial Evaluation  Date: 6/21/2021 Treatment Date:     Short Term/ Long Term   Goals   Was pt agreeable to Eval/treatment? Yes   To be met in 5 days   Pain level   0/10       Bed Mobility    Rolling: Independent   Supine to sit: Minimal assist of 1   Sit to supine: Minimal assist of 1   Scooting: Minimal assist of 1   Rolling: Independent   Supine to sit: Independent   Sit to supine: Independent   Scooting: Independent    Transfers Sit to stand: Minimal assist of 1 from EOB  Sit to stand: Independent    Ambulation    5 side steps using  wheeled walker with Moderate assist of 1   unsteady on feet, narrow base of support, bilateral knee buckling.    45 feet using  wheeled walker with Modified Independent    Stair negotiation: ascended and descended   Not assessed      10 steps with HR and supervision    ROM Within functional limits      Strength BUE:  refer to OT radha  RLE:  3+/5  LLE:  3/5  Increase strength in affected mm groups by 1/3 grade   Balance Sitting EOB:  fair +  Dynamic Standing:  poor + with [employer]  Sitting EOB:  good   Dynamic Standing: fair +     Patient is Alert & Oriented x person, place, time and situation and follows directions   Sensation:  Patient  reports numbness/tingling bilateral lower extremities    Edema:  no   Endurance: fair     Vitals: room air   Blood Pressure at rest  Blood Pressure during session    Heart Rate at rest  Heart Rate during session    SPO2 at rest 97%  SPO2 during session %     Patient education  Patient educated on role of Physical Therapy, risks of immobility, safety and plan of care,  importance of mobility while in hospital , ankle pumps, quad set and glut set for edema control, blood clot prevention, importance and purpose of adaptive device and adjusted to proper height for the patient. and safety      Patient response to education:   Pt verbalized understanding Pt demonstrated skill Pt requires further education in this area   Yes Partial Yes      Treatment:  Patient practiced and was instructed/facilitated in the following treatment: Patient assisted to EOB. Sat edge of bed 10 minutes with Supervision  to increase dynamic sitting balance and activity tolerance. Patient stood and took 5 side steps with WW and moderate assist, knee buckling noted bilaterally. Patient performed exercises seated EOB. Patient assisted back to supine position. Patient performed supine exercises in bed     Therapeutic Exercises:  ankle pumps, glut sets, heel slide, hip abduction/adduction and straight leg raise  x 10 reps. Seated EOB LAQ 2x10 reps.      At end of session, patient in bed with alarm call light and phone within reach,  all lines and tubes intact, nursing notified. Patient would benefit from continued skilled Physical Therapy to improve functional independence and quality of life. Patient's/ family goals   get stronger (patient is open to rehab if unable to perform stairs)      Time in  1349  Time out  1419    Total Treatment Time  10 minutes    Evaluation time includes thorough review of current medical information, gathering information on past medical history/social history and prior level of function, completion of standardized testing/informal observation of tasks, assessment of data, and development of Plan of care and goals.      CPT codes:  Low Complexity PT evaluation (88711)  Therapeutic exercises (46611)   10 minutes  1 unit(s)    Jose R Dietrich PT

## 2021-06-22 LAB
ANION GAP SERPL CALCULATED.3IONS-SCNC: 9 MMOL/L (ref 7–16)
BUN BLDV-MCNC: 5 MG/DL (ref 6–20)
CALCIUM SERPL-MCNC: 8.7 MG/DL (ref 8.6–10.2)
CHLORIDE BLD-SCNC: 106 MMOL/L (ref 98–107)
CO2: 21 MMOL/L (ref 22–29)
CREAT SERPL-MCNC: 0.5 MG/DL (ref 0.5–1)
GFR AFRICAN AMERICAN: >60
GFR NON-AFRICAN AMERICAN: >60 ML/MIN/1.73
GLUCOSE BLD-MCNC: 88 MG/DL (ref 74–99)
HCT VFR BLD CALC: 32.2 % (ref 34–48)
HEMOGLOBIN: 10.9 G/DL (ref 11.5–15.5)
MAGNESIUM: 2.2 MG/DL (ref 1.6–2.6)
MCH RBC QN AUTO: 34.1 PG (ref 26–35)
MCHC RBC AUTO-ENTMCNC: 33.9 % (ref 32–34.5)
MCV RBC AUTO: 100.6 FL (ref 80–99.9)
PDW BLD-RTO: 18 FL (ref 11.5–15)
PLATELET # BLD: 293 E9/L (ref 130–450)
PMV BLD AUTO: 9.8 FL (ref 7–12)
POTASSIUM SERPL-SCNC: 4.2 MMOL/L (ref 3.5–5)
RBC # BLD: 3.2 E12/L (ref 3.5–5.5)
SODIUM BLD-SCNC: 136 MMOL/L (ref 132–146)
TSH SERPL DL<=0.05 MIU/L-ACNC: 8.79 UIU/ML (ref 0.27–4.2)
WBC # BLD: 6.7 E9/L (ref 4.5–11.5)

## 2021-06-22 PROCEDURE — 80048 BASIC METABOLIC PNL TOTAL CA: CPT

## 2021-06-22 PROCEDURE — 84443 ASSAY THYROID STIM HORMONE: CPT

## 2021-06-22 PROCEDURE — 6360000002 HC RX W HCPCS: Performed by: INTERNAL MEDICINE

## 2021-06-22 PROCEDURE — 1200000000 HC SEMI PRIVATE

## 2021-06-22 PROCEDURE — 99232 SBSQ HOSP IP/OBS MODERATE 35: CPT | Performed by: INTERNAL MEDICINE

## 2021-06-22 PROCEDURE — 85027 COMPLETE CBC AUTOMATED: CPT

## 2021-06-22 PROCEDURE — G0378 HOSPITAL OBSERVATION PER HR: HCPCS

## 2021-06-22 PROCEDURE — 96372 THER/PROPH/DIAG INJ SC/IM: CPT

## 2021-06-22 PROCEDURE — 83735 ASSAY OF MAGNESIUM: CPT

## 2021-06-22 PROCEDURE — 97110 THERAPEUTIC EXERCISES: CPT

## 2021-06-22 PROCEDURE — 97535 SELF CARE MNGMENT TRAINING: CPT

## 2021-06-22 PROCEDURE — 97165 OT EVAL LOW COMPLEX 30 MIN: CPT

## 2021-06-22 PROCEDURE — 6370000000 HC RX 637 (ALT 250 FOR IP): Performed by: INTERNAL MEDICINE

## 2021-06-22 PROCEDURE — 2580000003 HC RX 258: Performed by: INTERNAL MEDICINE

## 2021-06-22 PROCEDURE — 36415 COLL VENOUS BLD VENIPUNCTURE: CPT

## 2021-06-22 RX ORDER — LEVOTHYROXINE SODIUM 112 UG/1
112 TABLET ORAL DAILY
Status: DISCONTINUED | OUTPATIENT
Start: 2021-06-23 | End: 2021-06-24 | Stop reason: HOSPADM

## 2021-06-22 RX ADMIN — CARVEDILOL 3.12 MG: 3.12 TABLET, FILM COATED ORAL at 21:35

## 2021-06-22 RX ADMIN — BENZTROPINE MESYLATE 0.5 MG: 0.5 TABLET ORAL at 21:35

## 2021-06-22 RX ADMIN — HALOPERIDOL 10 MG: 5 TABLET ORAL at 21:35

## 2021-06-22 RX ADMIN — Medication 10 ML: at 08:48

## 2021-06-22 RX ADMIN — PANTOPRAZOLE SODIUM 40 MG: 40 TABLET, DELAYED RELEASE ORAL at 05:46

## 2021-06-22 RX ADMIN — TOPIRAMATE 200 MG: 100 TABLET, FILM COATED ORAL at 21:35

## 2021-06-22 RX ADMIN — ATORVASTATIN CALCIUM 20 MG: 20 TABLET, FILM COATED ORAL at 08:48

## 2021-06-22 RX ADMIN — POTASSIUM CHLORIDE 40 MEQ: 1500 TABLET, EXTENDED RELEASE ORAL at 08:48

## 2021-06-22 RX ADMIN — FLUOXETINE 30 MG: 20 CAPSULE ORAL at 08:48

## 2021-06-22 RX ADMIN — LEVOTHYROXINE SODIUM 100 MCG: 0.1 TABLET ORAL at 05:46

## 2021-06-22 RX ADMIN — HALOPERIDOL 10 MG: 5 TABLET ORAL at 15:15

## 2021-06-22 RX ADMIN — HALOPERIDOL 10 MG: 5 TABLET ORAL at 08:47

## 2021-06-22 RX ADMIN — PRAZOSIN HYDROCHLORIDE 1 MG: 1 CAPSULE ORAL at 21:34

## 2021-06-22 RX ADMIN — TOPIRAMATE 200 MG: 100 TABLET, FILM COATED ORAL at 08:47

## 2021-06-22 RX ADMIN — CARVEDILOL 3.12 MG: 3.12 TABLET, FILM COATED ORAL at 08:48

## 2021-06-22 RX ADMIN — ENOXAPARIN SODIUM 40 MG: 40 INJECTION SUBCUTANEOUS at 08:47

## 2021-06-22 RX ADMIN — TRAZODONE HYDROCHLORIDE 50 MG: 50 TABLET ORAL at 21:35

## 2021-06-22 RX ADMIN — Medication 10 ML: at 21:36

## 2021-06-22 ASSESSMENT — PAIN SCALES - GENERAL: PAINLEVEL_OUTOF10: 0

## 2021-06-22 NOTE — PROGRESS NOTES
According to [facility], the patient was getting up to the bedside commode and she began to buckle. She witnessed and helped guide the patient to the floor. Set of vitals were taken and were all within normal limits. No injuries occurred. Notified Dr Vonda Drake about the incident. No new orders at this time.

## 2021-06-22 NOTE — PLAN OF CARE
Problem: Falls - Risk of:  Goal: Will remain free from falls  Description: Will remain free from falls  Outcome: Met This Shift     Problem: Falls - Risk of:  Goal: Absence of physical injury  Description: Absence of physical injury  Outcome: Met This Shift     Problem: Safety:  Goal: Free from accidental physical injury  Description: Free from accidental physical injury  Outcome: Met This Shift     Problem: Pain:  Goal: Patient's pain/discomfort is manageable  Description: Patient's pain/discomfort is manageable  Outcome: Met This Shift

## 2021-06-22 NOTE — PROGRESS NOTES
Physical Therapy Treatment Note/Plan of Care    Room #:  3442/3999-64  Patient Name: Humberto Krishna  YOB: 1970  MRN: [id number]    Date of Service: 6/22/2021     Tentative placement recommendation: Subacute vs Home Health Physical Therapy if patient meets goals (has 10 steps with hand rail to her room at group home)  Equipment recommendation: To be determined      Evaluating Physical Therapist: Jeremiah Auguste, [facility] #[id number]     Specific Provider Orders/Date/Referring Provider :   06/21/21 0830   PT eval and treat Start: 06/21/21 0830, End: 06/21/21 0830, ONE TIME, Standing Count: 1 Occurrences, Minoo Hernandez MD    Admitting Diagnosis:   Generalized weakness [R53.1]     Visit Diagnoses       Codes    Ataxia    -  Primary R27.0    General weakness     R53.1        Surgery: none    Patient Active Problem List   Diagnosis    Severe bipolar affective disorder with psychosis ([address]    Seizure disorder ([address]    Vitamin D deficiency    Hypothyroidism in adult    Essential hypertension    Mixed hyperlipidemia    Gastroesophageal reflux disease    Tobacco use    History of suicide attempt    Suicide attempt ([address]    Seizures ([address]    Depression, major, single episode    Schizoaffective disorder, bipolar type ([address]    Lithium toxicity    Depression, acute    Depression with suicidal ideation    Urge incontinence of urine    Chronic cough    Impacted cerumen of right ear    Chest pain    Depression, major, recurrent (HCC)    PTSD (post-traumatic stress disorder)    Severe episode of recurrent major depressive disorder, without psychotic features ([address]    Major depressive disorder, recurrent, severe without psychotic features ([address]    Acute depression    Bipolar 1 disorder ([address]    Generalized weakness    Hypokalemia    Hypomagnesemia       ASSESSMENT of Current Deficits Patient exhibits decreased strength, balance and endurance impairing functional mobility, transfers and gait . During ambulation, patient still unsteady and bilateral knee buckling noted. During exercises, patient presents with decreased eccentric control, but is improving since yesterday, left lower extremity worse compared to right. The patient will benefit from continued skilled therapy to increase strength and improve balance for safe functional mobility, to decrease risk of falls, and to meet goals at discharge. PHYSICAL THERAPY  PLAN OF CARE       Physical therapy plan of care is established based on physician order,  patient diagnosis and clinical assessment    Current Treatment Recommendations:    -Bed Mobility: Lower extremity exercises   -Sitting Balance: Incorporate reaching activities to activate trunk muscles   -Standing Balance: Perform strengthening exercises in standing to promote motor control with or without upper extremity support   -Transfers: Provide instruction on proper hand and foot position for adequate transfer of weight onto lower extremities and use of gait device  -Gait: Gait training and Standing activities to improve: base of support, weight shift, weight bearing    -Stairs: Stair training with instruction on proper technique and hand placement on rail    PT long term treatment goals are located in below grid    Patient and or family understand(s) diagnosis, prognosis, and plan of care. Frequency of treatments: Patient will be seen  daily.          Prior Level of Function: Patient ambulated independently   Rehab Potential: good  for baseline    Past medical history:   Past Medical History:   Diagnosis Date    Arthritis     Asthma     Bipolar 1 disorder ([facility]     Borderline personality disorder in adolescent [facility])     CAD (coronary artery disease)     COPD (chronic obstructive pulmonary disease) ([facility]     Depression     History of blood transfusion     Hyperlipidemia     Hypertension     PTSD (post-traumatic stress disorder)     Schizo affective schizophrenia ([facility]     Seizures ([facility]     Thyroid disease      Past Surgical History:   Procedure Laterality Date    ABDOMEN SURGERY      CHOLECYSTECTOMY      ENDOSCOPY, COLON, DIAGNOSTIC      JOINT REPLACEMENT      SHOULDER SURGERY         SUBJECTIVE:    Precautions: Up with assistance, falls and alarm , bilateral knee buckling, 3 falls in the last week at home  Social history: Patient lives in a group home on second floor (10 steps with HR) with 3 steps  to enter with [address] in shower     Equipment owned: None,      [address]   How much difficulty turning over in bed?: None  How much difficulty sitting down on / standing up from a chair with arms?: A Little  How much difficulty moving from lying on back to sitting on side of bed?: A Little  How much help from another person moving to and from a bed to a chair?: A Lot  How much help from another person needed to walk in hospital room?: A Lot  How much help from another person for climbing 3-5 steps with a railing?: Total  AM-PAC Inpatient Mobility Raw Score : 15  AM-PAC Inpatient T-Scale Score : 39.45  Mobility Inpatient CMS 0-100% Score: 57.7  Mobility Inpatient CMS G-Code Modifier : CK    Nursing cleared patient for PT treatment. .     OBJECTIVE;   Initial Evaluation  Date: 6/21/2021 Treatment Date:    6/22/2021   Short Term/ Long Term   Goals   Was pt agreeable to Eval/treatment? Yes  Yes To be met in 5 days   Pain level   0/10   0/10    Bed Mobility    Rolling: Independent   Supine to sit: Minimal assist of 1   Sit to supine: Minimal assist of 1   Scooting: Minimal assist of 1  Rolling: Not assessed    Supine to sit: Not assessed    Sit to supine: Minimal assist of 1   Scooting: Minimal assist of 1    Rolling: Independent   Supine to sit:  Independent   Sit to supine: Independent   Scooting: Independent    Transfers Sit to stand: Minimal assist of 1 from EOB Sit to stand: Minimal assist of 1 from EOB x5     Sit to stand: Independent    Ambulation    5 side steps using  wheeled walker with Moderate assist of 1   unsteady on feet, narrow base of support, bilateral knee buckling. 5 forward/backward steps using  wheeled walker with Minimal assist of 1   progressing to moderate assist as patient fatigues quickly, knee buckling noted. 5 side steps to [facility] with [employer] and minimal assist     45 feet using  wheeled walker with Modified Independent    Stair negotiation: ascended and descended   Not assessed      10 steps with HR and supervision    ROM Within functional limits      Strength BUE:  refer to OT eval  RLE:  3+/5  LLE:  3/5  Increase strength in affected mm groups by 1/3 grade   Balance Sitting EOB:  fair +  Dynamic Standing:  poor + with [employer] Sitting EOB: fair +  Dynamic Standing: fair - with [employer]   Sitting EOB:  good   Dynamic Standing: fair +     Patient is Alert & Oriented x person, place, time and situation and follows directions   Sensation:  Patient  reports numbness/tingling bilateral lower extremities    Edema:  no   Endurance: fair     Vitals: room air   Blood Pressure at rest  Blood Pressure during session    Heart Rate at rest  Heart Rate during session    SPO2 at rest 98%  SPO2 during session %     Patient education  Patient educated on role of Physical Therapy, risks of immobility, safety and plan of care,  importance of mobility while in hospital , ankle pumps, quad set and glut set for edema control, blood clot prevention, importance and purpose of adaptive device and adjusted to proper height for the patient. and safety      Patient response to education:   Pt verbalized understanding Pt demonstrated skill Pt requires further education in this area   Yes Partial Yes      Treatment:  Patient practiced and was instructed/facilitated in the following treatment: Patient sitting  EOB with OT at start of session. Sat edge of bed 10 minutes with Supervision  to increase dynamic sitting balance and activity tolerance.  Patient stood and took 5 side steps forward/backwards with [employer] and moderate assist, knee buckling noted bilaterally. Patient performed exercises seated EOB. Patient assisted back to supine position. Patient performed supine exercises in bed. Patient reports being fatigued after session. Therapeutic Exercises:  ankle pumps, glut sets, heel slide, hip abduction/adduction and straight leg raise  x 10 reps. Seated EOB LAQ 2x10 reps. 5 x STS. Cuing for eccentric control as patinet slaps foot down after LAQs. At end of session, patient in bed with alarm call light and phone within reach,  all lines and tubes intact, nursing notified. Patient would benefit from continued skilled Physical Therapy to improve functional independence and quality of life.          Patient's/ family goals   get stronger (patient is open to rehab if unable to perform stairs)      Time in  0920  Time out  0937    Total Treatment Time  17 minutes    CPT codes:  Therapeutic exercises (21804)   17 minutes  1 unit(s)    [facility], [facility] #[id number]

## 2021-06-22 NOTE — PROGRESS NOTES
[address] [facility]  [facility]. [place]        Date:2021                                                  Patient Name: Courtney Hooker    MRN: [id number]    : 1970    Room: [address] [place] [address]      Evaluating OT: Nelia Needle OTR/L; [id number]     Referring Provider and Specific Provider Orders/Date:      21  OT eval and treat Start: 21, End: 21, ONE TIME, Standing Count: 1 Occurrences, R      Magali Metzger MD     Placement Recommendation: Subacute        Diagnosis:   1. Ataxia    2.  General weakness         Surgery: no sx       Pertinent Medical History:       Past Medical History:   Diagnosis Date    Arthritis     Asthma     B[facility] 1 [facility]     Borderline personality disorder in adolescent [facility])     CAD (coronary artery disease)     COPD (chronic obstructive pulmonary disease) [facility]     Depression     History of blood transfusion     Hyperlipidemia     Hypertension     PTSD (post-traumatic stress disorder)     Schizo affective schizophrenia ([facility]     Seizures ([facility]     Thyroid disease          Past Surgical History:   Procedure Laterality Date    ABDOMEN SURGERY      CHOLECYSTECTOMY      ENDOSCOPY, COLON, DIAGNOSTIC      JOINT REPLACEMENT      SHOULDER SURGERY        Precautions:  Fall Risk, alarm     Assessment of current deficits    [x] Functional mobility  [x]ADLs  [x] Strength               []Cognition    [x] Functional transfers   [x] IADLs         [] Safety Awareness   [x]Endurance    [] Fine Coordination              [x] Balance      [] Vision/perception   [x]Sensation     []Gross Motor Coordination  [] ROM  [] Delirium                   [] Motor Control     OT PLAN OF CARE   OT POC based on physician orders, patient diagnosis and results of clinical assessment    Frequency/Duration 1-3 days/wk for 2 weeks PRN     Specific OT Treatment clothing?: A Little  How much help for taking care of personal grooming?: A Little  How much help for eating meals?: None  AM-PAC Inpatient Daily Activity Raw Score: 19  AM-PAC Inpatient ADL T-Scale Score : 40.22  ADL Inpatient CMS 0-100% Score: 42.8  ADL Inpatient CMS G-Code Modifier : CK     Functional Assessment:    Initial Eval Status  Date: 6/22/21 Treatment Status  Date: STGs = LTGs  Time frame: 10-14 days   Feeding Independent to bring cracker to mouth  Independent    Grooming Minimal Assist   Independent    UB Dressing Minimal Assist   Independent    LB Dressing Minimal Assist   Independent    Bathing Minimal Assist  Modified [place]    Toileting Minimal Assist   Modified [place]    Bed Mobility  Supine to sit: Supervision   Sit to supine: N/T as pt was seated EOB    Supine to sit: Independent   Sit to supine: Independent    Functional Transfers Minimal Assist from EOB to wheeled walker. Moderate Assist for transfer to and from bedside commode. Transfer training with verbal cues for hand placement throughout session to improve safety. Independent    Functional Mobility Minimal progressing to Moderate Assist with wheeled walker to and from bedside commode to improve balance, verbal cues for walker sequence and safety. B LE weakness resulting in slight buckling upon returned from bedside commode  Independent    Balance Sitting:     Static: good     Dynamic: fair   Standing: fair/fair minus with wheeled walker     Activity Tolerance fair   good    Visual/  Perceptual Glasses: yes                Hand Dominance: right      AROM (PROM) Strength Additional Info:    RUE  WFL 4/5 good  and wfl FMC/dexterity noted during ADL tasks     LUE WFL 4/5 good  and wfl FMC/dexterity noted during ADL tasks       Hearing: [facility]   Sensation:  Numbness in finger tips  Tone: WFL   Edema: no     Comments: Upon arrival patient supine.   At end of session, patient was seated EOB and left in care of PT to start their treatment, Call light and phone within reach, all lines and tubes intact. Overall patient demonstrated decreased independence and safety during completion of ADL/functional transfer/mobility tasks. Pt would benefit from continued skilled OT to increase safety and independence with completion of ADL/IADL tasks for functional independence and quality of life. Treatment: OT treatment provided this date includes:    Instruction/training on safety and adapted techniques for completion of ADLs    Instruction/training on safe functional mobility/transfer techniques    Instruction/training on energy conservation/work simplification for completion of ADLs    Proper Positioning/Alignment    Rehab Potential: Good for established goals. Patient / Family Goal: return to group home if she can do steps with PT      Patient and/or family were instructed on functional diagnosis, prognosis/goals and OT plan of care. Demonstrated good understanding. Eval Complexity: Low    Time In: 8:52am   Time Out: 9:22am       Min Units   OT Eval Low 64366  X     OT Eval Medium 62760      OT Eval High 00297      OT Re-Eval [id number]            ADL/Self Care 92472 8    Therapeutic Activities 14454 2      Therapeutic Ex 68508       Orthotic Management 63862       Manual 86044     Neuro Re-Ed 03368       Non-Billable Time     Total Treatment Time:   10  1      Evaluation Time additionally includes thorough review of current medical information, gathering information on past medical history/social history and prior level of function, interpretation of standardized testing/informal observation of tasks, assessment of data and development of plan of care and goals.         Evaluating OT: Jean-Claude Burden OTR/L; [id number]

## 2021-06-22 NOTE — CARE COORDINATION
6/22/2021: SS Note/Discharge plan:  Pt declined by [address] [facility], pt informed and alternative SNF providers offered, pt prefers [facility], referral made to [facility] admissions liaison who confirmed that they will accept pt and PRE CERT SUBMITTED TODAY 6/22, sw will follow to confirm transfer arrangements. Electronically signed by MARK Wynn on 6/22/2021 at 11:25 AM

## 2021-06-22 NOTE — PROGRESS NOTES
[address] [place], [place] [address]ist   Progress Note    Admitting Date and Time: 6/19/2021  7:49 PM  Admit Dx: Generalized weakness [R53.1]    Subjective/interval history:    Pt feels much better today. She was able to walk some with therapy. Potassium stable at 4.2, magnesium within normal limits at 2.2 today. TSH was elevated in the past, and was again elevated on this admission 8.79. On chart review, it does not appear that her levothyroxine dose was adjusted as she had several appointment and lab no-shows and cancellations.      [START ON 6/23/2021] levothyroxine  112 mcg Oral Daily    potassium chloride  40 mEq Oral Daily    sodium chloride flush  10 mL Intravenous 2 times per day    enoxaparin  40 mg Subcutaneous Daily    atorvastatin  20 mg Oral Daily    traZODone  50 mg Oral Nightly    benztropine  0.5 mg Oral Nightly    topiramate  200 mg Oral BID    carvedilol  3.125 mg Oral BID    prazosin  1 mg Oral Nightly    haloperidol  10 mg Oral TID    FLUoxetine  30 mg Oral Daily    pantoprazole  40 mg Oral QAM AC     sodium chloride flush, 10 mL, PRN  sodium chloride, 25 mL, PRN  promethazine, 12.5 mg, Q6H PRN   Or  ondansetron, 4 mg, Q6H PRN  polyethylene glycol, 17 g, Daily PRN  acetaminophen, 650 mg, Q6H PRN   Or  acetaminophen, 650 mg, Q6H PRN         Objective:    BP 99/64   Pulse 77   Temp 98.5 °F (36.9 °C) (Oral)   Resp 16   Ht 5' 1\" (1.549 m)   Wt 171 lb (77.6 kg)   LMP 02/13/2014 (Approximate)   SpO2 96%   BMI 32.31 kg/m²   General Appearance: alert  and in no acute distress  Skin: warm and dry  Head: normocephalic and atraumatic  Eyes: pupils equal, round, and reactive to light, extraocular eye movements intact, conjunctivae normal  Neck: neck supple and non tender without mass   Pulmonary/Chest: clear to auscultation bilaterally- no wheezes, rales or rhonchi, normal air movement, no respiratory distress  Cardiovascular: normal rate, normal S1 and S2 and no carotid bruits  Abdomen: soft, non-tender, non-distended, normal bowel sounds, no masses or organomegaly  Extremities: no cyanosis, no clubbing and no edema  Neurologic: no cranial nerve deficit and speech normal      Recent Labs     06/19/21 2034 06/20/21 1947 06/21/21 0455 06/22/21  0502     --  136 136   K 2.8* 3.4* 4.2 4.2   CL 98  --  104 106   CO2 23  --  22 21*   BUN 11  --  4* 5*   CREATININE 0.6  --  0.5 0.5   GLUCOSE 132*  --  120* 88   CALCIUM 9.8  --  8.8 8.7       Recent Labs     06/19/21 2034 06/21/21 0455   ALKPHOS 120* 102   PROT 7.1 6.3*   LABALBU 3.9 3.4*   BILITOT 0.5 0.4   AST 30 24   ALT 16 17       Recent Labs     06/19/21 2034 06/21/21 0455 06/22/21  0502   WBC 9.4 7.0 6.7   RBC 3.85 3.55 3.20*   HGB 13.4 12.1 10.9*   HCT 38.0 35.6 32.2*   MCV 98.7 100.3* 100.6*   MCH 34.8 34.1 34.1   MCHC 35.3* 34.0 33.9   RDW 17.8* 17.6* 18.0*    339 293   MPV 9.5 9.7 9.8       Magnesium [[id number]] (Abnormal) Collected: 06/19/21 2034      Updated: 06/19/21 2113      Magnesium 1.5 mg/dL          Radiology:   XR CHEST 1 VIEW   Final Result   No acute process. CT Head WO Contrast   Final Result   No acute intracranial abnormality. No significant interval change. Assessment:  Principal Problem:    Generalized weakness  Active Problems:    Hypothyroidism in adult    Gastroesophageal reflux disease    Hypokalemia    Hypomagnesemia  Resolved Problems:    * No resolved hospital problems. *      Plan:    1. Hypokalemia/hypomagnesemia--continue potassium chloride 40 mEq daily. Oral magnesium discontinued for now, as her magnesium was 3.2 yesterday. It is within normal limits today. Repeat tomorrow morning. 2. Generalized weakness--PT/OT consulted. Severe hypokalemia is likely contributing to leg weakness. 3. Seizure disorder--continue home meds. 4. Mood disorder/psych disorder--continue home meds.   5. Hypothyroidism--increase levothyroxine to 112 mcg daily and recheck TSH in 2 to 3 months. 6. GERD--continue PPI. Disposition: Awaiting subacute rehab placement    NOTE: This report was transcribed using voice recognition software. Every effort was made to ensure accuracy; however, inadvertent computerized transcription errors may be present.      Electronically signed by Sonya Smith DO on 6/22/2021 at 2:27 PM

## 2021-06-22 NOTE — DISCHARGE INSTR - COC
Continuity of Care Form    Patient Name: Courtney Hooker   :  1970  MRN:  [id number]    [facility] date:  2021  Discharge date:  2021    Code Status Order: Full Code   Advance Directives:   Advance Care Flowsheet Documentation       Date/Time Healthcare Directive Type of Healthcare Directive Copy in [address] Agent's Name Healthcare Agent's Phone Number    21 1211  No, patient does not have an advance directive for healthcare treatment -- -- -- -- --    21 1210  No, patient does not have an advance directive for healthcare treatment -- -- -- -- --            Admitting Physician:  Magali Metzger MD  PCP: JOSE ANGEL Davis NP    Discharging Nurse: [facility] Unit/Room#: 7123/5077-24  Discharging Unit Phone Number: [phone]    Emergency Contact:   Extended Emergency Contact Information  Primary Emergency Contact: [address] [facility] Phone: [phone]  Relation: Other  Preferred language: English   needed?  No    Past Surgical History:  Past Surgical History:   Procedure Laterality Date    ABDOMEN SURGERY      CHOLECYSTECTOMY      ENDOSCOPY, COLON, DIAGNOSTIC      JOINT REPLACEMENT      SHOULDER SURGERY         Immunization History:   Immunization History   Administered Date(s) Administered    Influenza, Quadv, IM, PF (6 mo and older Fluzone, Flulaval, Fluarix, and 3 yrs and older Afluria) 2017, 2017, 2019    PPD Test 2017, 10/10/2017    Pneumococcal Polysaccharide ([id number]) 2018    Tdap (Boostrix, Adacel) 2019       Active Problems:  Patient Active Problem List   Diagnosis Code    Severe bipolar affective disorder with psychosis ([facility] F31.89    Seizure disorder ([facility] [facility] G40.909    Vitamin D deficiency E55.9    Hypothyroidism in adult E03.9    Essential hypertension I10    Mixed hyperlipidemia E78.2    Gastroesophageal reflux disease K21.9    Tobacco use Z72.0    History of suicide attempt Z91.5    Suicide attempt ([facility] [address] T14.91XA    Seizures ([facility] [address] R56.9    Depression, major, single episode F32.9    Schizoaffective disorder, bipolar type ([facility] [address] F25.0    Lithium toxicity T56.891A    Depression, acute F32.9    Depression with suicidal ideation F32.9, R45.851    Urge incontinence of urine N39.41    Chronic cough R05    Impacted cerumen of right ear H61.21    Chest pain R07.9    Depression, major, recurrent (HCC) F33.9    PTSD (post-traumatic stress disorder) F43.10    Severe episode of recurrent major depressive disorder, without psychotic features ([facility] [address] F33.2    Major depressive disorder, recurrent, severe without psychotic features ([facility] F33.2    Acute depression F32.9    Bipolar 1 disorder ([facility]) F31.9    Generalized weakness R53.1    Hypokalemia E87.6    Hypomagnesemia E83.42       Isolation/Infection:   Isolation            No Isolation          Patient Infection Status       None to display            Nurse Assessment:  Last Vital Signs: BP 99/64   Pulse 77   Temp 98.5 °F (36.9 °C) (Oral)   Resp 16   Ht 5' 1\" (1.549 m)   Wt 171 lb (77.6 kg)   LMP 02/13/2014 (Approximate)   SpO2 96%   BMI 32.31 kg/m²     Last documented pain score (0-10 scale): Pain Level: 0  Last Weight:   Wt Readings from Last 1 Encounters:   06/19/21 171 lb (77.6 kg)     Mental Status:  oriented and alert    IV Access:  - None    Nursing Mobility/ADLs:  Walking   Assisted  Transfer  Assisted  Bathing  Assisted  Dressing  Assisted  Toileting  Assisted  Feeding  Independent  Med Admin  Assisted  Med Delivery   whole    Wound Care Documentation and Therapy:        Elimination:  Continence:   · Bowel:  Yes  · Bladder: Yes  Urinary Catheter: None   Colostomy/Ileostomy/Ileal Conduit: No       Date of Last BM: 6/24/2021    Intake/Output Summary (Last 24 hours) at 6/22/2021 1130  Last data filed at 6/22/2021 0840  Gross per 24 hour   Intake 1120 ml   Output 2350 ml   Net -1230 ml     I/O last 3 completed shifts: In: 1120 [P.O.:1120]  Out: 2750 [Urine:2550; Emesis/NG output:200]    Safety Concerns:     History of Falls (last 30 days)    Impairments/Disabilities:      None    Nutrition Therapy:  Current Nutrition Therapy:   - Oral Diet:  General    Routes of Feeding: Oral  Liquids: Thin Liquids  Daily Fluid Restriction: no  Last Modified Barium Swallow with Video (Video Swallowing Test): not done    Treatments at the Time of Hospital Discharge:   Respiratory Treatments: ***  Oxygen Therapy:  is not on home oxygen therapy.   Ventilator:    - No ventilator support    Rehab Therapies: Physical Therapy and Occupational Therapy  Weight Bearing Status/Restrictions: No weight bearing restirctions  Other Medical Equipment (for information only, NOT a DME order):  walker  Other Treatments: ***    Patient's personal belongings (please select all that are sent with patient):  {[facility] DME Belongings:[id number]}    RN SIGNATURE:  Electronically signed by Cresencio Fagan RN on 6/24/21 at 2:37 PM EDT    CASE MANAGEMENT/SOCIAL WORK SECTION    Inpatient Status Date: 06/19/2021    Readmission Risk Assessment Score:  Readmission Risk              Risk of Unplanned Readmission:  14           Discharging to Facility/ Agency   · Name: [facility]  · Address:  · Phone: [phone]  · Fax: [phone]    Dialysis Facility (if applicable)   · Name:  · Address:  · Dialysis Schedule:  · Phone:  · Fax:    / signature: Electronically signed by MARK Salgado on 6/22/21 at 11:30 AM EDT    PHYSICIAN SECTION    Prognosis: Good    Condition at Discharge: Stable    Rehab Potential (if transferring to Rehab): Good    Recommended Labs or Other Treatments After Discharge: PT/OT, weekly BMP, magnesium, and phosphorus     Physician Certification: I certify the above information and transfer of Mandy Paez  is necessary for the continuing treatment of the diagnosis listed and that she requires Skilled Nursing

## 2021-06-23 LAB
ANION GAP SERPL CALCULATED.3IONS-SCNC: 9 MMOL/L (ref 7–16)
BUN BLDV-MCNC: 5 MG/DL (ref 6–20)
CALCIUM SERPL-MCNC: 9.1 MG/DL (ref 8.6–10.2)
CHLORIDE BLD-SCNC: 105 MMOL/L (ref 98–107)
CO2: 22 MMOL/L (ref 22–29)
CREAT SERPL-MCNC: 0.5 MG/DL (ref 0.5–1)
GFR AFRICAN AMERICAN: >60
GFR NON-AFRICAN AMERICAN: >60 ML/MIN/1.73
GLUCOSE BLD-MCNC: 103 MG/DL (ref 74–99)
MAGNESIUM: 1.9 MG/DL (ref 1.6–2.6)
POTASSIUM SERPL-SCNC: 3.9 MMOL/L (ref 3.5–5)
SARS-COV-2, NAAT: NOT DETECTED
SODIUM BLD-SCNC: 136 MMOL/L (ref 132–146)

## 2021-06-23 PROCEDURE — 6370000000 HC RX 637 (ALT 250 FOR IP): Performed by: INTERNAL MEDICINE

## 2021-06-23 PROCEDURE — 87635 SARS-COV-2 COVID-19 AMP PRB: CPT

## 2021-06-23 PROCEDURE — 97530 THERAPEUTIC ACTIVITIES: CPT

## 2021-06-23 PROCEDURE — 80048 BASIC METABOLIC PNL TOTAL CA: CPT

## 2021-06-23 PROCEDURE — 1200000000 HC SEMI PRIVATE

## 2021-06-23 PROCEDURE — 83735 ASSAY OF MAGNESIUM: CPT

## 2021-06-23 PROCEDURE — 99232 SBSQ HOSP IP/OBS MODERATE 35: CPT | Performed by: INTERNAL MEDICINE

## 2021-06-23 PROCEDURE — 97110 THERAPEUTIC EXERCISES: CPT

## 2021-06-23 PROCEDURE — 97535 SELF CARE MNGMENT TRAINING: CPT

## 2021-06-23 PROCEDURE — 6360000002 HC RX W HCPCS: Performed by: INTERNAL MEDICINE

## 2021-06-23 PROCEDURE — 2580000003 HC RX 258: Performed by: INTERNAL MEDICINE

## 2021-06-23 PROCEDURE — G0378 HOSPITAL OBSERVATION PER HR: HCPCS

## 2021-06-23 PROCEDURE — 96372 THER/PROPH/DIAG INJ SC/IM: CPT

## 2021-06-23 PROCEDURE — 36415 COLL VENOUS BLD VENIPUNCTURE: CPT

## 2021-06-23 RX ADMIN — FLUOXETINE 30 MG: 20 CAPSULE ORAL at 07:56

## 2021-06-23 RX ADMIN — TOPIRAMATE 200 MG: 100 TABLET, FILM COATED ORAL at 07:57

## 2021-06-23 RX ADMIN — BENZTROPINE MESYLATE 0.5 MG: 0.5 TABLET ORAL at 20:22

## 2021-06-23 RX ADMIN — CARVEDILOL 3.12 MG: 3.12 TABLET, FILM COATED ORAL at 20:22

## 2021-06-23 RX ADMIN — POTASSIUM CHLORIDE 40 MEQ: 1500 TABLET, EXTENDED RELEASE ORAL at 07:56

## 2021-06-23 RX ADMIN — TRAZODONE HYDROCHLORIDE 50 MG: 50 TABLET ORAL at 20:22

## 2021-06-23 RX ADMIN — PRAZOSIN HYDROCHLORIDE 1 MG: 1 CAPSULE ORAL at 20:21

## 2021-06-23 RX ADMIN — PANTOPRAZOLE SODIUM 40 MG: 40 TABLET, DELAYED RELEASE ORAL at 05:08

## 2021-06-23 RX ADMIN — ATORVASTATIN CALCIUM 20 MG: 20 TABLET, FILM COATED ORAL at 07:57

## 2021-06-23 RX ADMIN — LEVOTHYROXINE SODIUM 112 MCG: 112 TABLET ORAL at 06:13

## 2021-06-23 RX ADMIN — TOPIRAMATE 200 MG: 100 TABLET, FILM COATED ORAL at 20:22

## 2021-06-23 RX ADMIN — CARVEDILOL 3.12 MG: 3.12 TABLET, FILM COATED ORAL at 07:57

## 2021-06-23 RX ADMIN — ENOXAPARIN SODIUM 40 MG: 40 INJECTION SUBCUTANEOUS at 07:57

## 2021-06-23 RX ADMIN — HALOPERIDOL 10 MG: 5 TABLET ORAL at 07:56

## 2021-06-23 RX ADMIN — HALOPERIDOL 10 MG: 5 TABLET ORAL at 17:25

## 2021-06-23 RX ADMIN — Medication 10 ML: at 20:24

## 2021-06-23 RX ADMIN — HALOPERIDOL 10 MG: 5 TABLET ORAL at 20:21

## 2021-06-23 RX ADMIN — Medication 10 ML: at 07:57

## 2021-06-23 ASSESSMENT — PAIN SCALES - GENERAL: PAINLEVEL_OUTOF10: 0

## 2021-06-23 NOTE — PROGRESS NOTES
Physical Therapy Treatment Note/Plan of Care    Room #:  8942/5980-95  Patient Name: Manish Ruiz  YOB: 1970  MRN: [id number]    Date of Service: 6/23/2021     Tentative placement recommendation: Subacute vs Home Health Physical Therapy if patient meets goals (has 10 steps with hand rail to her room at group home)  Equipment recommendation: To be determined      Evaluating Physical Therapist: Kristyn Ruiz, [address] [facility] #[id number]     Specific Provider Orders/Date/Referring Provider :   06/21/21 0830   PT eval and treat Start: 06/21/21 0830, End: 06/21/21 0830, ONE TIME, Standing Count: 1 Occurrences, Chang Salcedo MD    Admitting Diagnosis:   Generalized weakness [R53.1]     Visit Diagnoses       Codes    Ataxia    -  Primary R27.0    General weakness     R53.1        Surgery: none    Patient Active Problem List   Diagnosis    Severe bipolar affective disorder with psychosis ([address]    Seizure disorder ([address]    Vitamin D deficiency    Hypothyroidism in adult    Essential hypertension    Mixed hyperlipidemia    Gastroesophageal reflux disease    Tobacco use    History of suicide attempt    Suicide attempt ([address]    Seizures ([address]    Depression, major, single episode    Schizoaffective disorder, bipolar type ([address]    Lithium toxicity    Depression, acute    Depression with suicidal ideation    Urge incontinence of urine    Chronic cough    Impacted cerumen of right ear    Chest pain    Depression, major, recurrent (HCC)    PTSD (post-traumatic stress disorder)    Severe episode of recurrent major depressive disorder, without psychotic features ([address]    Major depressive disorder, recurrent, severe without psychotic features ([address]    Acute depression    Bipolar 1 disorder ([address]    Generalized weakness    Hypokalemia    Hypomagnesemia       ASSESSMENT of Current Deficits Patient exhibits decreased strength, balance and endurance impairing functional mobility, transfers and gait . During exercises, patient presents with decreased eccentric control, left lower extremity worse compared to right. While performing sit to stand transfers, patient fatigued after third rep, bilateral knee buckling. Unable to progress gait distance due to safety. The patient will benefit from continued skilled therapy to increase strength and improve balance for safe functional mobility, to decrease risk of falls, and to meet goals at discharge. PHYSICAL THERAPY  PLAN OF CARE       Physical therapy plan of care is established based on physician order,  patient diagnosis and clinical assessment    Current Treatment Recommendations:    -Bed Mobility: Lower extremity exercises   -Sitting Balance: Incorporate reaching activities to activate trunk muscles   -Standing Balance: Perform strengthening exercises in standing to promote motor control with or without upper extremity support   -Transfers: Provide instruction on proper hand and foot position for adequate transfer of weight onto lower extremities and use of gait device  -Gait: Gait training and Standing activities to improve: base of support, weight shift, weight bearing    -Stairs: Stair training with instruction on proper technique and hand placement on rail    PT long term treatment goals are located in below grid    Patient and or family understand(s) diagnosis, prognosis, and plan of care. Frequency of treatments: Patient will be seen  daily.          Prior Level of Function: Patient ambulated independently   Rehab Potential: good  for baseline    Past medical history:   Past Medical History:   Diagnosis Date    Arthritis     Asthma     Bipolar 1 disorder ([facility] [address]     Borderline personality disorder in adolescent [facility])     CAD (coronary artery disease)     COPD (chronic obstructive pulmonary disease) ([facility] [address]     Depression     History of blood transfusion     Hyperlipidemia     Hypertension     PTSD (post-traumatic stress disorder)     Schizo to stand: Independent    Ambulation    5 side steps using  wheeled walker with Moderate assist of 1   unsteady on feet, narrow base of support, bilateral knee buckling. 5 side steps using  wheeled walker with Moderate assist of 1  bilateral knee buckling       45 feet using  wheeled walker with Modified Independent    Stair negotiation: ascended and descended   Not assessed      10 steps with HR and supervision    ROM Within functional limits      Strength BUE:  refer to OT eval  RLE:  3+/5  LLE:  3/5  Increase strength in affected mm groups by 1/3 grade   Balance Sitting EOB:  fair +  Dynamic Standing:  poor + with [employer] Sitting EOB: fair +  Dynamic Standing: poor + with [employer]   Sitting EOB:  good   Dynamic Standing: fair +     Patient is Alert & Oriented x person, place, time and situation and follows directions   Sensation:  Patient  reports numbness/tingling bilateral lower extremities    Edema:  no   Endurance: fair     Vitals: room air   Blood Pressure at rest  Blood Pressure during session    Heart Rate at rest  Heart Rate during session    SPO2 at rest 100%  SPO2 during session  %     Patient education  Patient educated on role of Physical Therapy, risks of immobility, safety and plan of care,  importance of mobility while in hospital , ankle pumps, quad set and glut set for edema control, blood clot prevention, importance and purpose of adaptive device and adjusted to proper height for the patient. and safety      Patient response to education:   Pt verbalized understanding Pt demonstrated skill Pt requires further education in this area   Yes Partial Yes      Treatment:  Patient practiced and was instructed/facilitated in the following treatment: Patient assisted to EOB. Sat edge of bed 15 minutes with Supervision  to increase dynamic sitting balance and activity tolerance. seated challeneges. Patient stood, took a few steps and knee buckling noted bilaterally, moderate assist to correct.  Patient assisted

## 2021-06-23 NOTE — CARE COORDINATION
6/23/2021: SS Note/Discharge plan: COVID PENDING:  Awaiting PRE CERT for LUIZ admission to [facility].S.., [address] generated in Epic, HENS exemption completed, sw to follow, nursing informed.  Electronically signed by MARK Wells on 6/23/2021 at 4:05 PM

## 2021-06-23 NOTE — PROGRESS NOTES
OT BEDSIDE TREATMENT NOTE      Date:2021  Patient Name: Manish Ruiz  MRN: [id number]  : 1970  Room: [address] [place] [address]     Evaluating OT: Sunni Valle OTR/MARCIAL; [id number]      Referring Provider and Specific Provider Orders/Date:      21   OT eval and treat Start: 21, End: 21, ONE TIME, Standing Count: 1 Occurrences, R      Kalpesh Regan MD      Placement Recommendation: Subacute         Diagnosis:   1. Ataxia    2. General weakness         Surgery: no sx        Pertinent Medical History:       Past Medical History        Past Medical History:   Diagnosis Date    Arthritis      Asthma      Bipolar 1 disorder (HCC)      Borderline personality disorder in [facility])      CAD (coronary artery disease)      COPD (chronic obstructive pulmonary disease) (HCC)      Depression      History of blood transfusion      Hyperlipidemia      Hypertension      PTSD (post-traumatic stress disorder)      Schizo affective schizophrenia ([facility] [address] [facility]      Seizures ([facility] [facility]      Thyroid disease              Past Surgical History         Past Surgical History:   Procedure Laterality Date    ABDOMEN SURGERY        CHOLECYSTECTOMY        ENDOSCOPY, COLON, DIAGNOSTIC        JOINT REPLACEMENT        SHOULDER SURGERY              Precautions:  Fall Risk, alarm      Assessment of current deficits    [x]? Functional mobility            [x]?ADLs           [x]? Strength                   []?Cognition    [x]? Functional transfers          [x]? IADLs         []? Safety Awareness   [x]? Endurance    []? Fine Coordination              [x]? Balance      []? Vision/perception    [x]? Sensation      []? Gross Motor Coordination  []? ROM           []?  Delirium                   []? Motor Control      OT PLAN OF CARE   OT POC based on physician orders, patient diagnosis and results of clinical assessment     Frequency/Duration 1-3 days/wk for 2 weeks PRN      Specific OT Treatment Interventions to A Little  How much help for putting on and taking off regular upper body clothing?: A Little  How much help for taking care of personal grooming?: A Little  How much help for eating meals?: None  AM-PAC Inpatient Daily Activity Raw Score: 19  AM-PAC Inpatient ADL T-Scale Score : 40.22  ADL Inpatient CMS 0-100% Score: 42.8  ADL Inpatient CMS G-Code Modifier : CK                Functional Assessment:     Initial Eval Status  Date: 6/22/21 Treatment Status  Date: 6/23/2021 STGs = LTGs  Time frame: 10-14 days   Feeding Independent to bring cracker to mouth  Independent to bring sandwich and cup of coffee to mouth Independent    Grooming Minimal Assist  Pt able to bring B hands to head to wash and towel dry hair; pt required assist to comb hair d/t tangles; pt dropped comb x 1 rep during session 2* to catching comb on tangle  Independent    UB Dressing Minimal Assist  Min A to untie back of gown; pt able to thread B UE's through gown; assist to tie back of gown when seated EOB  Independent    LB Dressing Minimal Assist  Pt able to cross B LE's to change socks; pt required min A for upright sitting balance d/t anterior flexion and pt fearful of falling  Independent    Bathing Minimal Assist Min A ; pt able to wash UB and B LE's and feet when seated EOB; assist to wash back; pt had assist for pericare earlier  Modified Wilson    Toileting Minimal Assist   N/T; pt had been up to [facility] earlier and had a fall this AM and last PM per nsg; pt declined need to use BSC at this time Modified Wilson    Bed Mobility  Supine to sit: Supervision   Sit to supine: N/T as pt was seated EOB    Supervision for supine to sit; supervision for scooting; supervision for sit to supine; bed alarm on Supine to sit: Independent   Sit to supine: Independent    Functional Transfers Minimal Assist from EOB to wheeled walker. Moderate Assist for transfer to and from bedside commode.    Transfer training with verbal cues for hand placement throughout session to improve safety.  N/T d/t pt fearful of falling Independent    Functional Mobility Minimal progressing to Moderate Assist with wheeled walker to and from bedside commode to improve balance, verbal cues for walker sequence and safety. B LE weakness resulting in slight buckling upon returned from bedside commode  N/T Independent    Balance Sitting:     Static: good     Dynamic: fair   Standing: fair/fair minus with wheeled walker Sitting:     Static: good     Dynamic: fair   Standing:N/T      Activity Tolerance fair  fair  good    Visual/  Perceptual Glasses: yes                       Hand Dominance: right        AROM (PROM) Strength Additional Info:    RUE  WFL 4/5 good  and wfl FMC/dexterity noted during ADL tasks      LUE WFL 4/5 good  and wfl FMC/dexterity noted during ADL tasks       - BUE strengthening exercises: 15 reps in all planes of movement with mod resist theraband to increase/maintain strength required for functional transfers/ADL participation. Exercises completed in shoulder and elbow flexion/extension, internal/external rotation and abduction/adduction. Min rest breaks provided 2* to decreased endurance/ tolerance . Ex's completed upon pt's RTB with HOB elevated. Hearing: WFL   Sensation:  C/o Numbness in B hands and B feet  Tone: WFL   Edema: no       Comments: Patient cleared by nursing staff. Upon arrival pt seated in bed eating lunch. Pt agreeable to OT tx session. Pt educated with regards to bed mobility, hand placement, safety awareness, static sitting balance,  ADL retraining,  grooming tasks, UE/LE bathing, LE/UE dressing, B UE strengthening ex's,  ECT's. At end of session pt seated in bed with HOB elevated with all needs within reach and alarm on. Overall, pt demonstrated decreased independence and safety during completion of ADL/functional transfers/mobility tasks.  Pt would benefit from continued skilled OT to increase safety and independence with completion of ADL/IADL tasks for functional independence and quality of life. Pt required cues and education as noted above for safe facilitation and completion of tasks. Therapist provided skilled monitoring of patient's response during treatment session. Prior to and at the end of session, environmental modifications completed for patients safety and efficiency of treatment session. Overall, patient demonstrates minimal/moderate difficulties with completion of BADLs and IADLs. Factors contributing to these difficulties include falls, fearful of falling, pt c/o \"not feeling\" B hands or B feet, decreased endurance, and generalized weakness. As noted above, patient likely to benefit from further OT intervention to increase independence, safety, and overall quality of life. Treatment:     ? Bed mobility: Facilitated bed mobility with cues for proper body mechanics and sequencing to prepare for ADL completion. ? ADL completion: Self-care retraining for the above-mentioned ADLs; training on proper hand placement, safety technique, sequencing, and energy conservation techniques. Min A for sitting balance at EOB   ? Postural Balance: Sitting balance retraining to improve righting reactions with postural changes during ADLs. ? Skilled positioning: Proper positioning to improve interaction with environment, overall functioning   ? Therapeutic Ex's: To increase B UE strength required for functional transfers and ADL participation. · Pt has made fair/fair minus progress towards set goals    ·   OT 1-3x/week for 5-7 days during hospitalization       Treatment Time also includes thorough review of current medical information, gathering information on past medical history/social history and prior level of function, informal observation of tasks, assessment of data and education on plan of care and goals.     Treatment Time In: 11:38 AM     Treatment Time Out: 12:25 PM            Treatment Charges: Mins Units ADL/Home Mgt     [facility]     Neuro Re-ed         [id number]     Orthotic manage/training                               [id number]     Non Billable Time     Total Timed Treatment [address] [facility], [facility]

## 2021-06-23 NOTE — PROGRESS NOTES
[address] [place], [place] [address]ist   Progress Note    Admitting Date and Time: 6/19/2021  7:49 PM  Admit Dx: Generalized weakness [R53.1]    Subjective/interval history:    Patient had a mechanical fall this morning, bruised her left knee. No symptoms of syncope, presyncope, chest pain, shortness of breath, dizziness, or lightheadedness.  levothyroxine  112 mcg Oral Daily    potassium chloride  40 mEq Oral Daily    sodium chloride flush  10 mL Intravenous 2 times per day    enoxaparin  40 mg Subcutaneous Daily    atorvastatin  20 mg Oral Daily    traZODone  50 mg Oral Nightly    benztropine  0.5 mg Oral Nightly    topiramate  200 mg Oral BID    carvedilol  3.125 mg Oral BID    prazosin  1 mg Oral Nightly    haloperidol  10 mg Oral TID    FLUoxetine  30 mg Oral Daily    pantoprazole  40 mg Oral QAM AC     sodium chloride flush, 10 mL, PRN  sodium chloride, 25 mL, PRN  promethazine, 12.5 mg, Q6H PRN   Or  ondansetron, 4 mg, Q6H PRN  polyethylene glycol, 17 g, Daily PRN  acetaminophen, 650 mg, Q6H PRN   Or  acetaminophen, 650 mg, Q6H PRN         Objective:    /77   Pulse 85   Temp 97.9 °F (36.6 °C) (Oral)   Resp 20   Ht 5' 1\" (1.549 m)   Wt 171 lb (77.6 kg)   LMP 02/13/2014 (Approximate)   SpO2 98%   BMI 32.31 kg/m²   General Appearance: alert  and in no acute distress  Skin: warm and dry  Head: normocephalic and atraumatic  Eyes: pupils equal, round, and reactive to light, extraocular eye movements intact, conjunctivae normal  Neck: neck supple and non tender without mass   Pulmonary/Chest: clear to auscultation bilaterally- no wheezes, rales or rhonchi, normal air movement, no respiratory distress  Cardiovascular: normal rate, normal S1 and S2 and no carotid bruits  Abdomen: soft, non-tender, non-distended, normal bowel sounds, no masses or organomegaly  Extremities: Left knee with palpable hematoma on the lateral aspect . No cyanosis, no clubbing and no edema  Neurologic: no

## 2021-06-24 VITALS
WEIGHT: 171 LBS | TEMPERATURE: 98.1 F | DIASTOLIC BLOOD PRESSURE: 73 MMHG | RESPIRATION RATE: 16 BRPM | HEART RATE: 83 BPM | SYSTOLIC BLOOD PRESSURE: 118 MMHG | OXYGEN SATURATION: 93 % | BODY MASS INDEX: 32.28 KG/M2 | HEIGHT: 61 IN

## 2021-06-24 LAB
ANION GAP SERPL CALCULATED.3IONS-SCNC: 10 MMOL/L (ref 7–16)
BUN BLDV-MCNC: 5 MG/DL (ref 6–20)
CALCIUM SERPL-MCNC: 9.2 MG/DL (ref 8.6–10.2)
CHLORIDE BLD-SCNC: 103 MMOL/L (ref 98–107)
CO2: 22 MMOL/L (ref 22–29)
CREAT SERPL-MCNC: 0.5 MG/DL (ref 0.5–1)
GFR AFRICAN AMERICAN: >60
GFR NON-AFRICAN AMERICAN: >60 ML/MIN/1.73
GLUCOSE BLD-MCNC: 87 MG/DL (ref 74–99)
MAGNESIUM: 1.9 MG/DL (ref 1.6–2.6)
POTASSIUM SERPL-SCNC: 3.9 MMOL/L (ref 3.5–5)
SODIUM BLD-SCNC: 135 MMOL/L (ref 132–146)

## 2021-06-24 PROCEDURE — 96372 THER/PROPH/DIAG INJ SC/IM: CPT

## 2021-06-24 PROCEDURE — 83735 ASSAY OF MAGNESIUM: CPT

## 2021-06-24 PROCEDURE — 2580000003 HC RX 258: Performed by: INTERNAL MEDICINE

## 2021-06-24 PROCEDURE — 6370000000 HC RX 637 (ALT 250 FOR IP): Performed by: INTERNAL MEDICINE

## 2021-06-24 PROCEDURE — 99239 HOSP IP/OBS DSCHRG MGMT >30: CPT | Performed by: INTERNAL MEDICINE

## 2021-06-24 PROCEDURE — 80048 BASIC METABOLIC PNL TOTAL CA: CPT

## 2021-06-24 PROCEDURE — G0378 HOSPITAL OBSERVATION PER HR: HCPCS

## 2021-06-24 PROCEDURE — 6360000002 HC RX W HCPCS: Performed by: INTERNAL MEDICINE

## 2021-06-24 PROCEDURE — 36415 COLL VENOUS BLD VENIPUNCTURE: CPT

## 2021-06-24 RX ORDER — POTASSIUM CHLORIDE 20 MEQ/1
40 TABLET, EXTENDED RELEASE ORAL DAILY
Qty: 60 TABLET | Refills: 0 | DISCHARGE
Start: 2021-06-25

## 2021-06-24 RX ORDER — LEVOTHYROXINE SODIUM 112 UG/1
112 TABLET ORAL DAILY
Qty: 30 TABLET | Refills: 0 | DISCHARGE
Start: 2021-06-25

## 2021-06-24 RX ADMIN — HALOPERIDOL 10 MG: 5 TABLET ORAL at 08:28

## 2021-06-24 RX ADMIN — CARVEDILOL 3.12 MG: 3.12 TABLET, FILM COATED ORAL at 08:27

## 2021-06-24 RX ADMIN — FLUOXETINE 30 MG: 20 CAPSULE ORAL at 08:27

## 2021-06-24 RX ADMIN — Medication 10 ML: at 08:28

## 2021-06-24 RX ADMIN — POTASSIUM CHLORIDE 40 MEQ: 1500 TABLET, EXTENDED RELEASE ORAL at 08:27

## 2021-06-24 RX ADMIN — ATORVASTATIN CALCIUM 20 MG: 20 TABLET, FILM COATED ORAL at 08:27

## 2021-06-24 RX ADMIN — TOPIRAMATE 200 MG: 100 TABLET, FILM COATED ORAL at 08:27

## 2021-06-24 RX ADMIN — ENOXAPARIN SODIUM 40 MG: 40 INJECTION SUBCUTANEOUS at 08:28

## 2021-06-24 RX ADMIN — LEVOTHYROXINE SODIUM 112 MCG: 112 TABLET ORAL at 05:49

## 2021-06-24 RX ADMIN — PANTOPRAZOLE SODIUM 40 MG: 40 TABLET, DELAYED RELEASE ORAL at 05:49

## 2021-06-24 ASSESSMENT — PAIN SCALES - GENERAL: PAINLEVEL_OUTOF10: 0

## 2021-06-24 NOTE — DISCHARGE SUMMARY
[facility] Physician Discharge Summary       [facility] of [address] [place] [address] [address] [address]  [phone]          Activity level: Up with assistance    Diet: ADULT DIET; Regular; Low Fat/Low Chol/High Fiber/EDDIE    Labs: BMP and magnesium on or around 6/28/2021    Condition at discharge: Stable    Dispo: [email or web address]      Patient ID:  Je Alfredo  [id number]  46 y.o.  1970    Admit date: 6/19/2021    Discharge date and time:  6/24/2021  2:41 PM    Admission Diagnoses: Principal Problem:    Generalized weakness  Active Problems:    Hypothyroidism in adult    Gastroesophageal reflux disease    Hypokalemia    Hypomagnesemia  Resolved Problems:    * No resolved hospital problems. *      Discharge Diagnoses: Principal Problem:    Generalized weakness  Active Problems:    Hypothyroidism in adult    Gastroesophageal reflux disease    Hypokalemia    Hypomagnesemia  Resolved Problems:    * No resolved hospital problems. *      Consults:  IP CONSULT TO SOCIAL WORK    Procedures: None    Hospital Course: This is a 59-year-old female with history significant for psychiatric disorder, hypokalemia, hypothyroidism and GERD. She lives in a group home. She was admitted with generalized weakness, especially in the lower extremities, and foot was found to be severely hypokalemic and hypomagnesemic. She had IV and oral electrolyte replacement until her electrolytes were stable. She was maintained with oral potassium 40 mEq daily. She was on oral magnesium, however her magnesium went up to 3.2 so this was discontinued. Magnesium stayed stable without supplementation prior to discharge. Work-up also significant for elevated TSH. It appears her TSH was elevated in the past, but her levothyroxine dose was not adjusted.   On chart review, it appears this is due to no-shows with physician. Levothyroxine increased to 112 mcg daily from 100 mcg daily. She will need repeat TSH in 2 to 3 months. PT/OT worked with patient improve lower extremity strength, and this did improve. Will for discharge to subacute rehab on 6/24/2021. Discharge Exam:  Vitals:    06/23/21 0753 06/23/21 1709 06/24/21 0530 06/24/21 0800   BP: 122/77 113/71 118/73    Pulse: 85 81 83    Resp: 20 16 16    Temp: 97.9 °F (36.6 °C) 98 °F (36.7 °C) 98.1 °F (36.7 °C)    TempSrc: Oral Oral Oral    SpO2: 98%  95% 93%   Weight:       Height:           General Appearance: alert  and in no acute distress  Skin: warm and dry  Head: normocephalic and atraumatic  Eyes: pupils equal, round, and reactive to light, extraocular eye movements intact, conjunctivae normal  Neck: neck supple and non tender without mass   Pulmonary/Chest: clear to auscultation bilaterally- no wheezes, rales or rhonchi, normal air movement, no respiratory distress  Cardiovascular: normal rate, normal S1 and S2 and no carotid bruits  Abdomen: soft, non-tender, non-distended, normal bowel sounds, no masses or organomegaly  Extremities: Left knee with palpable hematoma on the lateral aspect . No cyanosis, no clubbing and no edema  Neurologic: no cranial nerve deficit and speech normal  I/O last 3 completed shifts: In: 1140 [P.O.:1140]  Out: 2450 [Urine:2450]  I/O this shift:  In: 180 [P.O.:180]  Out: 450 [Urine:450]      LABS:  Recent Labs     06/22/21  0502 06/23/21  0309 06/24/21  0453    136 135   K 4.2 3.9 3.9    105 103   CO2 21* 22 22   BUN 5* 5* 5*   CREATININE 0.5 0.5 0.5   GLUCOSE 88 103* 87   CALCIUM 8.7 9.1 9.2       Recent Labs     06/22/21  0502   WBC 6.7   RBC 3.20*   HGB 10.9*   HCT 32.2*   .6*   MCH 34.1   MCHC 33.9   RDW 18.0*      MPV 9.8       No results for input(s): POCGLU in the last 72 hours.       Imaging:  CT Head WO Contrast    Result Date: 6/19/2021  EXAMINATION: CT OF THE HEAD WITHOUT CONTRAST  6/19/2021 8:39 pm TECHNIQUE: CT of the head was performed without the administration of intravenous contrast. Dose modulation, iterative reconstruction, and/or weight based adjustment of the mA/kV was utilized to reduce the radiation dose to as low as reasonably achievable. COMPARISON: September 24, 2018 HISTORY: ORDERING SYSTEM PROVIDED HISTORY: weakness in lower extremities TECHNOLOGIST PROVIDED HISTORY: Reason for exam:->weakness in lower extremities Has a \"code stroke\" or \"stroke alert\" been called? ->No Decision Support Exception - unselect if not a suspected or confirmed emergency medical condition->Emergency Medical Condition (MA) FINDINGS: BRAIN/VENTRICLES: There is no acute intracranial hemorrhage, mass effect or midline shift. No abnormal extra-axial fluid collection. The gray-white differentiation is maintained without evidence of an acute infarct. There is no evidence of hydrocephalus. ORBITS: The visualized portion of the orbits demonstrate no acute abnormality. SINUSES: The visualized paranasal sinuses and mastoid air cells demonstrate no acute abnormality. SOFT TISSUES/SKULL:  No acute abnormality of the visualized skull or soft tissues. No acute intracranial abnormality. No significant interval change. XR CHEST 1 VIEW    Result Date: 6/19/2021  EXAMINATION: ONE XRAY VIEW OF THE CHEST 6/19/2021 8:47 pm COMPARISON: 10/07/2020 HISTORY: ORDERING SYSTEM PROVIDED [facility] PROVIDED HISTORY: Reason for exam:->[facility] FINDINGS: The lungs are without acute focal process. There is no effusion or pneumothorax. The cardiomediastinal silhouette is without acute process. The osseous structures are without acute process. No acute process.          Patient Instructions:   Current Discharge Medication List      START taking these medications    Details   potassium chloride (KLOR-CON M) 20 MEQ extended release tablet Take 2 tablets by mouth daily  Qty: 60 tablet, Refills: 0         CONTINUE these Every effort was made to ensure accuracy; however, inadvertent computerized transcription errors may be present.      Signed:  Electronically signed by Brien Bonner DO on 6/24/2021 at 2:41 PM

## 2021-06-24 NOTE — PROGRESS NOTES
Attempted to call nurse to nurse to [facility] No answer. Will attempt to call back again.

## 2021-06-24 NOTE — CARE COORDINATION
6/24/2021: SS Note/Discharge plan: COVID PENDING:  Notified by [facility] that they are able to accept pt today with PRE CERT PENDING [facility] , pt and nursing informed, message left for pt's mother, Eloise Jones at her work regarding pt's transfer per pt request.Electronically signed by MARK Castrejon on 6/24/2021 at 2:18 PM

## 2021-07-01 ENCOUNTER — HOSPITAL ENCOUNTER (OUTPATIENT)
Dept: CT IMAGING | Age: 51
Discharge: HOME | End: 2021-07-01
Payer: COMMERCIAL

## 2021-07-01 DIAGNOSIS — G40.909 UNCLASSIFIED EPILEPTIC SEIZURES (HCC): ICD-10-CM

## 2021-07-01 PROCEDURE — 70450 CT HEAD/BRAIN W/O DYE: CPT

## 2022-11-09 ENCOUNTER — OUTSIDE SERVICES (OUTPATIENT)
Dept: INTERNAL MEDICINE CLINIC | Age: 52
End: 2022-11-09

## 2022-11-09 DIAGNOSIS — G40.909 NONINTRACTABLE EPILEPSY WITHOUT STATUS EPILEPTICUS, UNSPECIFIED EPILEPSY TYPE (HCC): ICD-10-CM

## 2022-11-09 DIAGNOSIS — F79 UNSPECIFIED INTELLECTUAL DISABILITIES: ICD-10-CM

## 2022-11-09 DIAGNOSIS — E03.9 HYPOTHYROIDISM, UNSPECIFIED TYPE: ICD-10-CM

## 2022-11-09 DIAGNOSIS — N39.41 URGE INCONTINENCE: ICD-10-CM

## 2022-11-09 DIAGNOSIS — E55.9 VITAMIN D DEFICIENCY: ICD-10-CM

## 2022-11-09 DIAGNOSIS — E78.2 MIXED HYPERLIPIDEMIA: ICD-10-CM

## 2022-11-09 DIAGNOSIS — M62.81 MUSCLE WEAKNESS: ICD-10-CM

## 2022-11-09 DIAGNOSIS — F43.12 CHRONIC POST-TRAUMATIC STRESS DISORDER (PTSD): ICD-10-CM

## 2022-11-09 DIAGNOSIS — R45.851 SUICIDAL IDEATIONS: ICD-10-CM

## 2022-11-09 DIAGNOSIS — F60.3 BORDERLINE PERSONALITY DISORDER (HCC): ICD-10-CM

## 2022-11-09 DIAGNOSIS — E87.6 HYPOKALEMIA: ICD-10-CM

## 2022-11-09 DIAGNOSIS — R56.9 CONVULSIONS, UNSPECIFIED CONVULSION TYPE (HCC): ICD-10-CM

## 2022-11-09 DIAGNOSIS — R27.0 ATAXIA, UNSPECIFIED: Primary | ICD-10-CM

## 2022-11-09 DIAGNOSIS — F25.0 SCHIZOAFFECTIVE DISORDER, BIPOLAR TYPE (HCC): ICD-10-CM

## 2022-11-09 DIAGNOSIS — K21.9 GASTROESOPHAGEAL REFLUX DISEASE, UNSPECIFIED WHETHER ESOPHAGITIS PRESENT: ICD-10-CM

## 2022-11-09 DIAGNOSIS — E66.9 OBESITY, UNSPECIFIED CLASSIFICATION, UNSPECIFIED OBESITY TYPE, UNSPECIFIED WHETHER SERIOUS COMORBIDITY PRESENT: ICD-10-CM

## 2022-11-09 DIAGNOSIS — J44.9 CHRONIC OBSTRUCTIVE PULMONARY DISEASE, UNSPECIFIED COPD TYPE (HCC): ICD-10-CM

## 2022-11-09 DIAGNOSIS — G62.9 POLYNEUROPATHY: ICD-10-CM

## 2022-11-09 DIAGNOSIS — I10 ESSENTIAL HYPERTENSION: ICD-10-CM

## 2022-11-09 DIAGNOSIS — F33.2 SEVERE EPISODE OF RECURRENT MAJOR DEPRESSIVE DISORDER, WITHOUT PSYCHOTIC FEATURES (HCC): ICD-10-CM

## 2022-11-09 DIAGNOSIS — I25.10 ATHEROSCLEROSIS OF NATIVE CORONARY ARTERY OF NATIVE HEART WITHOUT ANGINA PECTORIS: ICD-10-CM

## 2022-11-09 DIAGNOSIS — F44.5 CONVERSION DISORDER WITH SEIZURES OR CONVULSIONS: ICD-10-CM

## 2023-03-22 ENCOUNTER — OUTSIDE SERVICES (OUTPATIENT)
Dept: INTERNAL MEDICINE CLINIC | Age: 53
End: 2023-03-22

## 2023-03-22 DIAGNOSIS — F33.9 RECURRENT MAJOR DEPRESSIVE DISORDER, REMISSION STATUS UNSPECIFIED (HCC): ICD-10-CM

## 2023-03-22 DIAGNOSIS — F60.3 BORDERLINE PERSONALITY DISORDER (HCC): ICD-10-CM

## 2023-03-22 DIAGNOSIS — N39.41 URGE INCONTINENCE: ICD-10-CM

## 2023-03-22 DIAGNOSIS — I25.10 ATHEROSCLEROSIS OF NATIVE CORONARY ARTERY OF NATIVE HEART WITHOUT ANGINA PECTORIS: ICD-10-CM

## 2023-03-22 DIAGNOSIS — F25.0 SCHIZOAFFECTIVE DISORDER, BIPOLAR TYPE (HCC): ICD-10-CM

## 2023-03-22 DIAGNOSIS — K21.9 GASTROESOPHAGEAL REFLUX DISEASE, UNSPECIFIED WHETHER ESOPHAGITIS PRESENT: ICD-10-CM

## 2023-03-22 DIAGNOSIS — I10 ESSENTIAL HYPERTENSION: ICD-10-CM

## 2023-03-22 DIAGNOSIS — F31.89 OTHER BIPOLAR DISORDER (HCC): ICD-10-CM

## 2023-03-22 DIAGNOSIS — M62.81 MUSCLE WEAKNESS: ICD-10-CM

## 2023-03-22 DIAGNOSIS — R27.0 ATAXIA, UNSPECIFIED: Primary | ICD-10-CM

## 2023-03-22 DIAGNOSIS — R45.851 SUICIDAL IDEATIONS: ICD-10-CM

## 2023-03-22 DIAGNOSIS — R41.83 BORDERLINE INTELLECTUAL FUNCTIONING: ICD-10-CM

## 2023-03-22 DIAGNOSIS — E55.9 VITAMIN D DEFICIENCY: ICD-10-CM

## 2023-03-22 DIAGNOSIS — F44.5 CONVERSION DISORDER WITH SEIZURES OR CONVULSIONS: ICD-10-CM

## 2023-03-22 DIAGNOSIS — E78.2 MIXED HYPERLIPIDEMIA: ICD-10-CM

## 2023-03-22 DIAGNOSIS — R56.9 CONVULSIONS, UNSPECIFIED CONVULSION TYPE (HCC): ICD-10-CM

## 2023-03-22 DIAGNOSIS — E87.6 HYPOKALEMIA: ICD-10-CM

## 2023-03-22 DIAGNOSIS — E66.9 OBESITY, UNSPECIFIED CLASSIFICATION, UNSPECIFIED OBESITY TYPE, UNSPECIFIED WHETHER SERIOUS COMORBIDITY PRESENT: ICD-10-CM

## 2023-03-22 DIAGNOSIS — R53.1 WEAKNESS: ICD-10-CM

## 2023-03-22 DIAGNOSIS — J45.909 UNCOMPLICATED ASTHMA, UNSPECIFIED ASTHMA SEVERITY, UNSPECIFIED WHETHER PERSISTENT: ICD-10-CM

## 2023-03-22 DIAGNOSIS — G62.9 POLYNEUROPATHY: ICD-10-CM

## 2023-03-22 DIAGNOSIS — Z74.1 NEED FOR ASSISTANCE WITH PERSONAL CARE: ICD-10-CM

## 2023-03-22 DIAGNOSIS — E03.9 HYPOTHYROIDISM, UNSPECIFIED TYPE: ICD-10-CM

## 2023-03-22 DIAGNOSIS — J44.9 CHRONIC OBSTRUCTIVE PULMONARY DISEASE, UNSPECIFIED COPD TYPE (HCC): ICD-10-CM

## 2023-03-22 DIAGNOSIS — F79 UNSPECIFIED INTELLECTUAL DISABILITIES: ICD-10-CM

## 2023-03-22 DIAGNOSIS — R48.8 OTHER SYMBOLIC DYSFUNCTIONS: ICD-10-CM

## 2023-03-22 DIAGNOSIS — F43.12 CHRONIC POST-TRAUMATIC STRESS DISORDER (PTSD): ICD-10-CM

## 2023-03-22 DIAGNOSIS — G40.909 NONINTRACTABLE EPILEPSY WITHOUT STATUS EPILEPTICUS, UNSPECIFIED EPILEPSY TYPE (HCC): ICD-10-CM

## 2023-03-22 DIAGNOSIS — M12.9 ARTHROPATHY, UNSPECIFIED: ICD-10-CM

## 2023-03-22 DIAGNOSIS — F43.10 POST-TRAUMATIC STRESS DISORDER, UNSPECIFIED: ICD-10-CM

## 2023-04-04 VITALS
BODY MASS INDEX: 39.53 KG/M2 | DIASTOLIC BLOOD PRESSURE: 88 MMHG | OXYGEN SATURATION: 98 % | HEART RATE: 68 BPM | WEIGHT: 209.2 LBS | SYSTOLIC BLOOD PRESSURE: 125 MMHG | RESPIRATION RATE: 18 BRPM | TEMPERATURE: 97.5 F

## 2023-04-04 RX ORDER — ONDANSETRON 4 MG/1
4 TABLET, FILM COATED ORAL EVERY 8 HOURS PRN
COMMUNITY

## 2023-04-04 RX ORDER — SENNA PLUS 8.6 MG/1
2 TABLET ORAL DAILY PRN
COMMUNITY

## 2023-04-04 RX ORDER — ALBUTEROL SULFATE 90 UG/1
2 AEROSOL, METERED RESPIRATORY (INHALATION) EVERY 4 HOURS PRN
COMMUNITY

## 2023-04-04 RX ORDER — ALBUTEROL SULFATE 2.5 MG/3ML
2.5 SOLUTION RESPIRATORY (INHALATION) EVERY 6 HOURS PRN
COMMUNITY

## 2023-04-04 RX ORDER — GABAPENTIN 300 MG/1
300 CAPSULE ORAL
COMMUNITY

## 2023-04-04 RX ORDER — ACETAMINOPHEN 325 MG/1
650 TABLET ORAL EVERY 4 HOURS PRN
COMMUNITY

## 2023-04-04 RX ORDER — LOPERAMIDE HYDROCHLORIDE 2 MG/1
2 CAPSULE ORAL EVERY 12 HOURS PRN
COMMUNITY

## 2023-04-04 RX ORDER — BENZONATATE 100 MG/1
100 CAPSULE ORAL 3 TIMES DAILY PRN
COMMUNITY

## 2023-04-04 RX ORDER — DIPHENHYDRAMINE HCL 25 MG
50 TABLET ORAL EVERY 6 HOURS PRN
COMMUNITY

## 2023-04-04 RX ORDER — DOCUSATE SODIUM 100 MG/1
200 CAPSULE, LIQUID FILLED ORAL PRN
COMMUNITY

## 2023-04-04 RX ORDER — PANTOPRAZOLE SODIUM 40 MG/1
40 TABLET, DELAYED RELEASE ORAL DAILY
COMMUNITY

## 2023-04-04 ASSESSMENT — ENCOUNTER SYMPTOMS
SHORTNESS OF BREATH: 0
NAUSEA: 0
ABDOMINAL PAIN: 0
VOMITING: 0
DIARRHEA: 0

## 2023-04-04 NOTE — PROGRESS NOTES
MCV  92.9 fL 79.0 - 95.0  Final           MCH  28.9 pg 25.6 - 32.2  Final           MCHC  31.1 g/dL 32.2 - 35.5 L Final           RDW  13.6 % 11.7 - 14.4  Final           PLT  306 K/uL 182 - 369  Final           MPV  9.7 fL 9.4 - 12.3  Final        Assessment & Plan:    1. Nonintractable epilepsy without status epilepticus, unspecified epilepsy type ([facility]  No recent reported seizures. Continue Topamax 200 mg twice daily. We will continue to observe. 2. Essential hypertension  Blood pressure well controlled. Continue carvedilol 3.125 mg twice daily. 3. Schizoaffective disorder, bipolar type ([facility]  Continue current psych medications. She is on Haldol 10 mg twice daily  Continue Cogentin 0.5 mg daily  4. Other bipolar disorder ([facility].)  Continue current psych meds. Fairly stable  5. Atherosclerosis of native coronary artery of native heart without angina pectoris  No complaints of chest pain. 6. Chronic obstructive pulmonary disease, unspecified COPD type ([facility]  No recent exacerbations. Continue to observe  7. Recurrent major depressive disorder, remission status unspecified (HCC)  Continue Prozac 20 mg 3 capsules daily  8.  Mixed hyperlipidemia  Continue Lipitor       I, Rosendo Rey  am scribing for Dr. Sasha Can MD, personally performed the services described in this documentation as scribed by Rosendo Rey and it is both accurate and complete

## 2023-05-03 ENCOUNTER — OUTSIDE SERVICES (OUTPATIENT)
Dept: INTERNAL MEDICINE CLINIC | Age: 53
End: 2023-05-03
Payer: MEDICARE

## 2023-05-03 DIAGNOSIS — G40.909 NONINTRACTABLE EPILEPSY WITHOUT STATUS EPILEPTICUS, UNSPECIFIED EPILEPSY TYPE (HCC): Primary | ICD-10-CM

## 2023-05-03 DIAGNOSIS — I10 ESSENTIAL HYPERTENSION: ICD-10-CM

## 2023-05-03 DIAGNOSIS — J44.9 CHRONIC OBSTRUCTIVE PULMONARY DISEASE, UNSPECIFIED COPD TYPE (HCC): ICD-10-CM

## 2023-05-03 DIAGNOSIS — E78.2 MIXED HYPERLIPIDEMIA: ICD-10-CM

## 2023-05-03 DIAGNOSIS — F31.89 OTHER BIPOLAR DISORDER (HCC): ICD-10-CM

## 2023-05-03 DIAGNOSIS — F33.9 RECURRENT MAJOR DEPRESSIVE DISORDER, REMISSION STATUS UNSPECIFIED (HCC): ICD-10-CM

## 2023-05-03 DIAGNOSIS — I25.10 ATHEROSCLEROSIS OF NATIVE CORONARY ARTERY OF NATIVE HEART WITHOUT ANGINA PECTORIS: ICD-10-CM

## 2023-05-03 DIAGNOSIS — F25.0 SCHIZOAFFECTIVE DISORDER, BIPOLAR TYPE (HCC): ICD-10-CM

## 2023-05-03 PROCEDURE — 99308 SBSQ NF CARE LOW MDM 20: CPT | Performed by: INTERNAL MEDICINE

## 2023-05-16 VITALS
RESPIRATION RATE: 17 BRPM | DIASTOLIC BLOOD PRESSURE: 60 MMHG | SYSTOLIC BLOOD PRESSURE: 87 MMHG | TEMPERATURE: 97.5 F | OXYGEN SATURATION: 93 % | WEIGHT: 207.7 LBS | HEART RATE: 67 BPM | BODY MASS INDEX: 39.24 KG/M2

## 2023-05-16 RX ORDER — NYSTATIN 10B UNIT
POWDER (EA) MISCELLANEOUS 2 TIMES DAILY
COMMUNITY

## 2023-05-16 RX ORDER — GUAIFENESIN 600 MG/1
600 TABLET, EXTENDED RELEASE ORAL 2 TIMES DAILY
COMMUNITY

## 2023-05-16 ASSESSMENT — ENCOUNTER SYMPTOMS
ABDOMINAL PAIN: 0
SHORTNESS OF BREATH: 0
VOMITING: 0
DIARRHEA: 0
NAUSEA: 0

## 2023-05-16 NOTE — PROGRESS NOTES
obstructive pulmonary disease, unspecified COPD type ([facility]  No recent exacerbations. 7. Recurrent major depressive disorder, remission status unspecified (HCC)  Continue Prozac 20 mg 3 capsules daily  8.  Mixed hyperlipidemia  Continue Lipitor      IAlexis  am scribing for Dr. Irena Vang MD, personally performed the services described in this documentation as scribed by Alexis Muñiz and it is both accurate and complete

## 2023-06-28 ENCOUNTER — OUTSIDE SERVICES (OUTPATIENT)
Dept: INTERNAL MEDICINE CLINIC | Age: 53
End: 2023-06-28
Payer: MEDICARE

## 2023-06-28 DIAGNOSIS — F31.89 OTHER BIPOLAR DISORDER (HCC): ICD-10-CM

## 2023-06-28 DIAGNOSIS — F33.9 RECURRENT MAJOR DEPRESSIVE DISORDER, REMISSION STATUS UNSPECIFIED (HCC): ICD-10-CM

## 2023-06-28 DIAGNOSIS — G40.909 NONINTRACTABLE EPILEPSY WITHOUT STATUS EPILEPTICUS, UNSPECIFIED EPILEPSY TYPE (HCC): Primary | ICD-10-CM

## 2023-06-28 DIAGNOSIS — I10 ESSENTIAL HYPERTENSION: ICD-10-CM

## 2023-06-28 DIAGNOSIS — E78.2 MIXED HYPERLIPIDEMIA: ICD-10-CM

## 2023-06-28 DIAGNOSIS — I25.10 ATHEROSCLEROSIS OF NATIVE CORONARY ARTERY OF NATIVE HEART WITHOUT ANGINA PECTORIS: ICD-10-CM

## 2023-06-28 DIAGNOSIS — F25.0 SCHIZOAFFECTIVE DISORDER, BIPOLAR TYPE (HCC): ICD-10-CM

## 2023-06-28 DIAGNOSIS — J44.9 CHRONIC OBSTRUCTIVE PULMONARY DISEASE, UNSPECIFIED COPD TYPE (HCC): ICD-10-CM

## 2023-06-28 PROCEDURE — 99309 SBSQ NF CARE MODERATE MDM 30: CPT | Performed by: INTERNAL MEDICINE

## 2023-07-10 VITALS
DIASTOLIC BLOOD PRESSURE: 56 MMHG | HEART RATE: 81 BPM | SYSTOLIC BLOOD PRESSURE: 91 MMHG | TEMPERATURE: 98 F | WEIGHT: 220 LBS | BODY MASS INDEX: 41.57 KG/M2

## 2023-07-10 ASSESSMENT — ENCOUNTER SYMPTOMS
VOMITING: 0
ABDOMINAL PAIN: 0
SHORTNESS OF BREATH: 0
NAUSEA: 0
DIARRHEA: 0

## 2023-07-11 NOTE — PROGRESS NOTES
Visit Date: 06/28/2023 - [address]  1970  female 48 y.o. Subjective:    CC: Patient presents with no current complaints. Her TSH is less than 0.01, so decreasing her levothyroxine to 100 mcg daily. Will recheck TSH in 6 weeks. This is a follow-up nursing home visit. HPI: Patient is seen and examined. Medications on chart reviewed. Labs are reviewed. Discussed with nursing staff. ROS:  Review of Systems   Constitutional:  Negative for chills and fever. Respiratory:  Negative for shortness of breath. Cardiovascular:  Negative for chest pain. Gastrointestinal:  Negative for abdominal pain, diarrhea, nausea and vomiting. Genitourinary:  Negative for dysuria and frequency. Neurological:  Negative for dizziness and headaches. Current Meds: Refer to nursing home record    PMH:    Medical Problems: reviewed and updated       Diagnosis Date    Arthritis     Asthma     Bipolar 1 disorder ([address])     Borderline personality disorder in adolescent [facility])     CAD (coronary artery disease)     COPD (chronic obstructive pulmonary disease) ([address])     Depression     History of blood transfusion     Hyperlipidemia     Hypertension     PTSD (post-traumatic stress disorder)     Schizo affective schizophrenia ([address])     Seizures ([address])     Thyroid disease         Surgical Hx: reviewed and updated   Past Surgical History:   Procedure Laterality Date    ABDOMINAL SURGERY      CHOLECYSTECTOMY      ENDOSCOPY, COLON, DIAGNOSTIC      JOINT REPLACEMENT      SHOULDER SURGERY          FH: reviewed and updated       Problem Relation Age of Onset    High Blood Pressure Mother     No Known Problems Father         SH: reviewed and updated    reports that she has quit smoking. Her smoking use included cigarettes. She has a 14.00 pack-year smoking history. She has never used smokeless tobacco. She reports that she does not drink alcohol and does not use drugs.      Objective:  BP (!) 91/56   Pulse 81

## 2023-08-15 ENCOUNTER — OUTSIDE SERVICES (OUTPATIENT)
Dept: INTERNAL MEDICINE CLINIC | Age: 53
End: 2023-08-15

## 2023-08-15 DIAGNOSIS — I10 ESSENTIAL HYPERTENSION: ICD-10-CM

## 2023-08-15 DIAGNOSIS — F31.89 OTHER BIPOLAR DISORDER (HCC): ICD-10-CM

## 2023-08-15 DIAGNOSIS — E78.2 MIXED HYPERLIPIDEMIA: ICD-10-CM

## 2023-08-15 DIAGNOSIS — E03.9 HYPOTHYROIDISM, UNSPECIFIED TYPE: ICD-10-CM

## 2023-08-15 DIAGNOSIS — F33.9 RECURRENT MAJOR DEPRESSIVE DISORDER, REMISSION STATUS UNSPECIFIED (HCC): ICD-10-CM

## 2023-08-15 DIAGNOSIS — G40.909 NONINTRACTABLE EPILEPSY WITHOUT STATUS EPILEPTICUS, UNSPECIFIED EPILEPSY TYPE (HCC): Primary | ICD-10-CM

## 2023-08-15 DIAGNOSIS — I25.10 ATHEROSCLEROSIS OF NATIVE CORONARY ARTERY OF NATIVE HEART WITHOUT ANGINA PECTORIS: ICD-10-CM

## 2023-08-15 DIAGNOSIS — J44.9 CHRONIC OBSTRUCTIVE PULMONARY DISEASE, UNSPECIFIED COPD TYPE (HCC): ICD-10-CM

## 2023-08-15 DIAGNOSIS — F25.0 SCHIZOAFFECTIVE DISORDER, BIPOLAR TYPE (HCC): ICD-10-CM

## 2023-09-04 VITALS
SYSTOLIC BLOOD PRESSURE: 100 MMHG | WEIGHT: 215 LBS | RESPIRATION RATE: 18 BRPM | TEMPERATURE: 97.9 F | HEART RATE: 69 BPM | DIASTOLIC BLOOD PRESSURE: 68 MMHG | OXYGEN SATURATION: 96 % | BODY MASS INDEX: 40.62 KG/M2

## 2023-09-04 ASSESSMENT — ENCOUNTER SYMPTOMS
VOMITING: 0
ABDOMINAL PAIN: 0
NAUSEA: 0
DIARRHEA: 0
SHORTNESS OF BREATH: 0

## 2023-09-05 NOTE — PROGRESS NOTES
sounds: Normal breath sounds. Comments: Clear to ausculation bilaterally  Non-traumatic  Chest:      Chest wall: No tenderness. Abdominal:      General: Bowel sounds are normal. There is no distension. Palpations: Abdomen is soft. There is no mass. Tenderness: There is no abdominal tenderness. Comments: No organomegaly   Musculoskeletal:         General: No tenderness. Normal range of motion. Cervical back: Neck supple. No tenderness. Right lower leg: No edema. Left lower leg: No edema. Comments: No clubbing, No cyanosis   Skin:     General: Skin is warm and dry. Neurological:      General: No focal deficit present. Mental Status: She is alert and oriented to person, place, and time. Assessment & Plan:    1. Nonintractable epilepsy without status epilepticus, unspecified epilepsy type ([address])  No recent reported seizures. Continue Topamax 200 mg twice daily. We will continue to observe. 2. Essential hypertension  Blood pressure well controlled. Continue carvedilol 3.125 mg twice daily. 3. Schizoaffective disorder, bipolar type ([address])  Continue current psych medications. She is on Haldol 10 mg twice daily  Continue Cogentin 0.5 mg daily  4. Other bipolar disorder ([address])  Continue current psych meds. Fairly stable  5. Atherosclerosis of native coronary artery of native heart without angina pectoris  Stable  6. Chronic obstructive pulmonary disease, unspecified COPD type ([address])  No recent exacerbations. 7. Recurrent major depressive disorder, remission status unspecified (HCC)  Continue Prozac 20 mg 3 capsules daily  8. Mixed hyperlipidemia  Continue Lipitor  9. Hypothyroidism. TSH less than 0.01. Decreased dose of levothyroxine from 125 mcg daily to 100 mcg daily.     Adalgisa Rojas am scribing for Dr. Annelise Moore MD, personally performed the services described in this documentation as scribed by Ibis Costa and it is

## 2023-10-17 ENCOUNTER — OUTSIDE SERVICES (OUTPATIENT)
Dept: INTERNAL MEDICINE CLINIC | Age: 53
End: 2023-10-17
Payer: MEDICARE

## 2023-10-17 DIAGNOSIS — I25.10 ATHEROSCLEROSIS OF NATIVE CORONARY ARTERY OF NATIVE HEART WITHOUT ANGINA PECTORIS: ICD-10-CM

## 2023-10-17 DIAGNOSIS — F31.89 OTHER BIPOLAR DISORDER (HCC): ICD-10-CM

## 2023-10-17 DIAGNOSIS — E03.9 HYPOTHYROIDISM, UNSPECIFIED TYPE: ICD-10-CM

## 2023-10-17 DIAGNOSIS — J44.9 CHRONIC OBSTRUCTIVE PULMONARY DISEASE, UNSPECIFIED COPD TYPE (HCC): ICD-10-CM

## 2023-10-17 DIAGNOSIS — G40.909 NONINTRACTABLE EPILEPSY WITHOUT STATUS EPILEPTICUS, UNSPECIFIED EPILEPSY TYPE (HCC): Primary | ICD-10-CM

## 2023-10-17 DIAGNOSIS — F25.0 SCHIZOAFFECTIVE DISORDER, BIPOLAR TYPE (HCC): ICD-10-CM

## 2023-10-17 DIAGNOSIS — F33.9 RECURRENT MAJOR DEPRESSIVE DISORDER, REMISSION STATUS UNSPECIFIED (HCC): ICD-10-CM

## 2023-10-17 DIAGNOSIS — I10 ESSENTIAL HYPERTENSION: ICD-10-CM

## 2023-10-17 DIAGNOSIS — E78.2 MIXED HYPERLIPIDEMIA: ICD-10-CM

## 2023-10-17 PROCEDURE — 99308 SBSQ NF CARE LOW MDM 20: CPT | Performed by: INTERNAL MEDICINE

## 2023-11-21 ENCOUNTER — OUTSIDE SERVICES (OUTPATIENT)
Dept: INTERNAL MEDICINE CLINIC | Age: 53
End: 2023-11-21
Payer: MEDICARE

## 2023-11-21 DIAGNOSIS — F25.0 SCHIZOAFFECTIVE DISORDER, BIPOLAR TYPE (HCC): ICD-10-CM

## 2023-11-21 DIAGNOSIS — E03.9 HYPOTHYROIDISM, UNSPECIFIED TYPE: ICD-10-CM

## 2023-11-21 DIAGNOSIS — E78.2 MIXED HYPERLIPIDEMIA: ICD-10-CM

## 2023-11-21 DIAGNOSIS — G40.909 NONINTRACTABLE EPILEPSY WITHOUT STATUS EPILEPTICUS, UNSPECIFIED EPILEPSY TYPE (HCC): Primary | ICD-10-CM

## 2023-11-21 DIAGNOSIS — I10 ESSENTIAL HYPERTENSION: ICD-10-CM

## 2023-11-21 DIAGNOSIS — J44.9 CHRONIC OBSTRUCTIVE PULMONARY DISEASE, UNSPECIFIED COPD TYPE (HCC): ICD-10-CM

## 2023-11-21 DIAGNOSIS — F31.89 OTHER BIPOLAR DISORDER (HCC): ICD-10-CM

## 2023-11-21 DIAGNOSIS — F33.9 RECURRENT MAJOR DEPRESSIVE DISORDER, REMISSION STATUS UNSPECIFIED (HCC): ICD-10-CM

## 2023-11-21 DIAGNOSIS — I25.10 ATHEROSCLEROSIS OF NATIVE CORONARY ARTERY OF NATIVE HEART WITHOUT ANGINA PECTORIS: ICD-10-CM

## 2023-11-21 PROCEDURE — 99308 SBSQ NF CARE LOW MDM 20: CPT | Performed by: INTERNAL MEDICINE

## 2023-11-25 VITALS
OXYGEN SATURATION: 94 % | TEMPERATURE: 97.8 F | BODY MASS INDEX: 41.19 KG/M2 | SYSTOLIC BLOOD PRESSURE: 98 MMHG | WEIGHT: 218 LBS | RESPIRATION RATE: 16 BRPM | DIASTOLIC BLOOD PRESSURE: 50 MMHG | HEART RATE: 61 BPM

## 2023-11-25 ASSESSMENT — ENCOUNTER SYMPTOMS
SHORTNESS OF BREATH: 0
ABDOMINAL PAIN: 0
VOMITING: 0
NAUSEA: 0
DIARRHEA: 0

## 2023-11-26 NOTE — PROGRESS NOTES
Visit Date: 10/17/2023 - 2525 K[address] D[address]  1970  female 48 y.o. Subjective:    CC: Patient presents with no current complaints. This is a follow-up nursing home visit. HPI: Patient seen and examined. Medications on chart reviewed. Labs are reviewed. Discussed with nursing staff. ROS:  Review of Systems   Constitutional:  Negative for chills and fever. Respiratory:  Negative for shortness of breath. Cardiovascular:  Negative for chest pain. Gastrointestinal:  Negative for abdominal pain, diarrhea, nausea and vomiting. Genitourinary:  Negative for dysuria and frequency. Musculoskeletal:  Positive for gait problem. Neurological:  Positive for weakness. Negative for dizziness, light-headedness and headaches. Current Outpatient Medications   Medication Sig Dispense Refill    nystatin (MYCOSTATIN) POWD powder Apply topically 2 times daily Apply to Groin topically every shift      albuterol sulfate HFA (PROVENTIL;VENTOLIN;PROAIR) 108 (90 Base) MCG/ACT inhaler Inhale 2 puffs into the lungs every 4 hours as needed for Shortness of Breath      benzocaine-menthol (CEPACOL SORE THROAT) 15-3.6 MG lozenge Take 1 lozenge by mouth every 2 hours as needed for Sore Throat      pantoprazole (PROTONIX) 40 MG tablet Take 1 tablet by mouth daily      gabapentin (NEURONTIN) 100 MG capsule Take 3 capsules by mouth.  Give 200 mg by mouth in the morning AND Give 400 mg by mouth at bedtime      acetaminophen (TYLENOL) 325 MG tablet Take 2 tablets by mouth every 4 hours as needed for Pain or Fever      potassium chloride (KLOR-CON M) 20 MEQ extended release tablet Take 2 tablets by mouth daily (Patient taking differently: Take 0.5 tablets by mouth daily) 60 tablet 0    levothyroxine (SYNTHROID) 112 MCG tablet Take 1 tablet by mouth Daily (Patient taking differently: Take 100 mcg by mouth Daily) 30 tablet 0    FLUoxetine (PROZAC) 20 MG capsule Take 3 capsules by mouth every morning      haloperidol

## 2024-01-02 VITALS
HEART RATE: 71 BPM | BODY MASS INDEX: 41.38 KG/M2 | OXYGEN SATURATION: 96 % | RESPIRATION RATE: 18 BRPM | WEIGHT: 219 LBS | SYSTOLIC BLOOD PRESSURE: 112 MMHG | TEMPERATURE: 97.6 F | DIASTOLIC BLOOD PRESSURE: 74 MMHG

## 2024-01-02 ASSESSMENT — ENCOUNTER SYMPTOMS
SHORTNESS OF BREATH: 0
VOMITING: 0
DIARRHEA: 0
NAUSEA: 0
ABDOMINAL PAIN: 0

## 2024-01-02 NOTE — PROGRESS NOTES
Visit Date: 11/21/2023 - [facility]  Mari Wilkinsolph  1970  female 53 y.o.      Subjective:    CC: Patient presents with no current complaints. This is a follow-up nursing home visit.     HPI: Patient seen and examined. Medications on chart reviewed. Labs are reviewed. Discussed with nursing staff.      ROS:  Review of Systems   Constitutional:  Negative for chills and fever.   Respiratory:  Negative for shortness of breath.    Cardiovascular:  Negative for chest pain.   Gastrointestinal:  Negative for abdominal pain, diarrhea, nausea and vomiting.   Genitourinary:  Negative for dysuria and frequency.   Musculoskeletal:  Positive for gait problem.   Neurological:  Positive for weakness. Negative for dizziness, light-headedness and headaches.      Current Outpatient Medications   Medication Sig Dispense Refill    guaiFENesin (MUCINEX) 600 MG extended release tablet Take 1 tablet by mouth 2 times daily (Patient not taking: Reported on 7/10/2023)      nystatin (MYCOSTATIN) POWD powder Apply topically 2 times daily Apply to Groin topically every shift      loperamide (IMODIUM) 2 MG capsule Take 1 capsule by mouth every 12 hours as needed for Diarrhea (Patient not taking: Reported on 11/25/2023)      diphenhydrAMINE (BENADRYL) 25 MG tablet Take 2 tablets by mouth every 6 hours as needed for Itching (Patient not taking: Reported on 11/25/2023)      albuterol sulfate HFA (PROVENTIL;VENTOLIN;PROAIR) 108 (90 Base) MCG/ACT inhaler Inhale 2 puffs into the lungs every 4 hours as needed for Shortness of Breath      hydrocortisone 2.5 % cream Apply topically daily as needed Apply to anus topically every 24 hours as needed for hemorrhoids (Patient not taking: Reported on 11/25/2023)      ondansetron (ZOFRAN) 4 MG tablet Take 1 tablet by mouth every 8 hours as needed for Nausea (Patient not taking: Reported on 11/25/2023)      benzocaine-menthol (CEPACOL SORE THROAT) 15-3.6 MG lozenge Take 1 lozenge by mouth every 2 hours as

## 2024-01-09 ENCOUNTER — OUTSIDE SERVICES (OUTPATIENT)
Dept: INTERNAL MEDICINE CLINIC | Age: 54
End: 2024-01-09
Payer: MEDICARE

## 2024-01-09 DIAGNOSIS — F25.0 SCHIZOAFFECTIVE DISORDER, BIPOLAR TYPE (HCC): ICD-10-CM

## 2024-01-09 DIAGNOSIS — E03.9 HYPOTHYROIDISM, UNSPECIFIED TYPE: ICD-10-CM

## 2024-01-09 DIAGNOSIS — F31.89 OTHER BIPOLAR DISORDER (HCC): ICD-10-CM

## 2024-01-09 DIAGNOSIS — J44.9 CHRONIC OBSTRUCTIVE PULMONARY DISEASE, UNSPECIFIED COPD TYPE (HCC): ICD-10-CM

## 2024-01-09 DIAGNOSIS — G40.909 NONINTRACTABLE EPILEPSY WITHOUT STATUS EPILEPTICUS, UNSPECIFIED EPILEPSY TYPE (HCC): Primary | ICD-10-CM

## 2024-01-09 DIAGNOSIS — F33.9 RECURRENT MAJOR DEPRESSIVE DISORDER, REMISSION STATUS UNSPECIFIED (HCC): ICD-10-CM

## 2024-01-09 DIAGNOSIS — I10 ESSENTIAL HYPERTENSION: ICD-10-CM

## 2024-01-09 DIAGNOSIS — E78.2 MIXED HYPERLIPIDEMIA: ICD-10-CM

## 2024-01-09 DIAGNOSIS — I25.10 ATHEROSCLEROSIS OF NATIVE CORONARY ARTERY OF NATIVE HEART WITHOUT ANGINA PECTORIS: ICD-10-CM

## 2024-01-09 PROCEDURE — 99308 SBSQ NF CARE LOW MDM 20: CPT | Performed by: INTERNAL MEDICINE

## 2024-01-16 VITALS
SYSTOLIC BLOOD PRESSURE: 126 MMHG | TEMPERATURE: 97.6 F | DIASTOLIC BLOOD PRESSURE: 72 MMHG | OXYGEN SATURATION: 96 % | WEIGHT: 219 LBS | RESPIRATION RATE: 18 BRPM | HEART RATE: 71 BPM | BODY MASS INDEX: 41.38 KG/M2

## 2024-01-16 RX ORDER — BUSPIRONE HYDROCHLORIDE 5 MG/1
5 TABLET ORAL 2 TIMES DAILY
COMMUNITY

## 2024-01-16 RX ORDER — EZETIMIBE 10 MG/1
10 TABLET ORAL
COMMUNITY

## 2024-01-16 RX ORDER — GUAIFENESIN 200 MG/10ML
200 LIQUID ORAL EVERY 6 HOURS PRN
COMMUNITY

## 2024-01-16 ASSESSMENT — ENCOUNTER SYMPTOMS
DIARRHEA: 0
ABDOMINAL PAIN: 0
SHORTNESS OF BREATH: 0
NAUSEA: 0
COUGH: 1
VOMITING: 0

## 2024-01-16 NOTE — PROGRESS NOTES
Visit Date: 1/9/2024 - [facility]  Mari Hernandezph  1970  female 53 y.o.      Subjective:    CC: Patient presents with complaint of cough.   This is a follow-up nursing home visit.     HPI: Patient seen and examined. Medications on chart reviewed. Labs are reviewed. Discussed with nursing staff.      ROS:  Review of Systems   Constitutional:  Negative for chills and fever.   Respiratory:  Positive for cough. Negative for shortness of breath.    Cardiovascular:  Negative for chest pain.   Gastrointestinal:  Negative for abdominal pain, diarrhea, nausea and vomiting.   Genitourinary:  Negative for dysuria and frequency.   Musculoskeletal:  Positive for gait problem.   Neurological:  Positive for weakness. Negative for dizziness, light-headedness and headaches.      Current Outpatient Medications   Medication Sig Dispense Refill    ezetimibe (ZETIA) 10 MG tablet Take 1 tablet by mouth nightly      busPIRone (BUSPAR) 5 MG tablet Take 1 tablet by mouth 2 times daily      guaiFENesin (ROBITUSSIN) 100 MG/5ML liquid Take 10 mLs by mouth every 6 hours as needed for Cough (for 7 days)      guaiFENesin (MUCINEX) 600 MG extended release tablet Take 1 tablet by mouth 2 times daily      nystatin (MYCOSTATIN) POWD powder Apply topically 2 times daily Apply to Groin topically every shift      albuterol sulfate HFA (PROVENTIL;VENTOLIN;PROAIR) 108 (90 Base) MCG/ACT inhaler Inhale 2 puffs into the lungs every 4 hours as needed for Shortness of Breath      benzocaine-menthol (CEPACOL SORE THROAT) 15-3.6 MG lozenge Take 1 lozenge by mouth every 2 hours as needed for Sore Throat      docusate sodium (COLACE) 100 MG capsule Take 2 capsules by mouth as needed for Constipation      pantoprazole (PROTONIX) 40 MG tablet Take 1 tablet by mouth daily      gabapentin (NEURONTIN) 800 MG tablet Take 1 tablet by mouth 3 times daily.      acetaminophen (TYLENOL) 325 MG tablet Take 2 tablets by mouth every 4 hours as needed for Pain or Fever

## 2024-02-06 ENCOUNTER — OUTSIDE SERVICES (OUTPATIENT)
Dept: INTERNAL MEDICINE CLINIC | Age: 54
End: 2024-02-06

## 2024-02-06 DIAGNOSIS — G40.909 NONINTRACTABLE EPILEPSY WITHOUT STATUS EPILEPTICUS, UNSPECIFIED EPILEPSY TYPE (HCC): Primary | ICD-10-CM

## 2024-02-06 DIAGNOSIS — J44.9 CHRONIC OBSTRUCTIVE PULMONARY DISEASE, UNSPECIFIED COPD TYPE (HCC): ICD-10-CM

## 2024-02-06 DIAGNOSIS — E78.2 MIXED HYPERLIPIDEMIA: ICD-10-CM

## 2024-02-06 DIAGNOSIS — F33.9 RECURRENT MAJOR DEPRESSIVE DISORDER, REMISSION STATUS UNSPECIFIED (HCC): ICD-10-CM

## 2024-02-06 DIAGNOSIS — F31.89 OTHER BIPOLAR DISORDER (HCC): ICD-10-CM

## 2024-02-06 DIAGNOSIS — F25.0 SCHIZOAFFECTIVE DISORDER, BIPOLAR TYPE (HCC): ICD-10-CM

## 2024-02-06 DIAGNOSIS — I25.10 ATHEROSCLEROSIS OF NATIVE CORONARY ARTERY OF NATIVE HEART WITHOUT ANGINA PECTORIS: ICD-10-CM

## 2024-02-06 DIAGNOSIS — I10 ESSENTIAL HYPERTENSION: ICD-10-CM

## 2024-02-06 DIAGNOSIS — E03.9 HYPOTHYROIDISM, UNSPECIFIED TYPE: ICD-10-CM

## 2024-02-12 VITALS
DIASTOLIC BLOOD PRESSURE: 51 MMHG | RESPIRATION RATE: 16 BRPM | BODY MASS INDEX: 41.91 KG/M2 | TEMPERATURE: 97.4 F | OXYGEN SATURATION: 93 % | SYSTOLIC BLOOD PRESSURE: 92 MMHG | WEIGHT: 221.8 LBS | HEART RATE: 69 BPM

## 2024-02-12 RX ORDER — HYDROXYZINE HYDROCHLORIDE 25 MG/1
25 TABLET, FILM COATED ORAL EVERY 8 HOURS PRN
COMMUNITY

## 2024-02-12 RX ORDER — BUPROPION HYDROCHLORIDE 150 MG/1
150 TABLET, EXTENDED RELEASE ORAL EVERY MORNING
COMMUNITY

## 2024-02-12 RX ORDER — FAMOTIDINE 20 MG/1
20 TABLET, FILM COATED ORAL 2 TIMES DAILY
COMMUNITY

## 2024-02-13 NOTE — PROGRESS NOTES
Visit Date: 2/6/2024 - [facility]  Mari Hernandezph  1970  female 53 y.o.      Subjective:    CC: Patient presents with no current complaints.     HPI: Patient seen and examined. Medications on chart reviewed. Labs are reviewed. Discussed with nursing staff.      ROS:  Review of Systems   Constitutional:  Negative for chills and fever.   Respiratory:  Positive for cough. Negative for shortness of breath.    Cardiovascular:  Negative for chest pain.   Gastrointestinal:  Negative for abdominal pain, diarrhea, nausea and vomiting.   Genitourinary:  Negative for dysuria and frequency.   Musculoskeletal:  Positive for gait problem.   Neurological:  Positive for weakness. Negative for dizziness, light-headedness and headaches.      Current Outpatient Medications   Medication Sig Dispense Refill    buPROPion (WELLBUTRIN SR) 150 MG extended release tablet Take 1 tablet by mouth every morning      hydrOXYzine HCl (ATARAX) 25 MG tablet Take 1 tablet by mouth every 8 hours as needed for Anxiety      famotidine (PEPCID) 20 MG tablet Take 1 tablet by mouth 2 times daily      sertraline (ZOLOFT) 50 MG tablet Take 1.5 tablets by mouth daily      ezetimibe (ZETIA) 10 MG tablet Take 1 tablet by mouth nightly      busPIRone (BUSPAR) 5 MG tablet Take 1 tablet by mouth 2 times daily      guaiFENesin (MUCINEX) 600 MG extended release tablet Take 1 tablet by mouth 2 times daily      nystatin (MYCOSTATIN) POWD powder Apply topically 2 times daily Apply to Groin topically every shift      albuterol sulfate HFA (PROVENTIL;VENTOLIN;PROAIR) 108 (90 Base) MCG/ACT inhaler Inhale 2 puffs into the lungs every 4 hours as needed for Shortness of Breath      benzocaine-menthol (CEPACOL SORE THROAT) 15-3.6 MG lozenge Take 1 lozenge by mouth every 2 hours as needed for Sore Throat      docusate sodium (COLACE) 100 MG capsule Take 2 capsules by mouth as needed for Constipation      gabapentin (NEURONTIN) 800 MG tablet Take 1 tablet by mouth 3 times

## 2024-03-05 ENCOUNTER — OUTSIDE SERVICES (OUTPATIENT)
Dept: INTERNAL MEDICINE CLINIC | Age: 54
End: 2024-03-05
Payer: MEDICARE

## 2024-03-05 DIAGNOSIS — I25.10 ATHEROSCLEROSIS OF NATIVE CORONARY ARTERY OF NATIVE HEART WITHOUT ANGINA PECTORIS: ICD-10-CM

## 2024-03-05 DIAGNOSIS — F25.0 SCHIZOAFFECTIVE DISORDER, BIPOLAR TYPE (HCC): ICD-10-CM

## 2024-03-05 DIAGNOSIS — F31.89 OTHER BIPOLAR DISORDER (HCC): ICD-10-CM

## 2024-03-05 DIAGNOSIS — G40.909 NONINTRACTABLE EPILEPSY WITHOUT STATUS EPILEPTICUS, UNSPECIFIED EPILEPSY TYPE (HCC): Primary | ICD-10-CM

## 2024-03-05 DIAGNOSIS — E78.2 MIXED HYPERLIPIDEMIA: ICD-10-CM

## 2024-03-05 DIAGNOSIS — I10 ESSENTIAL HYPERTENSION: ICD-10-CM

## 2024-03-05 DIAGNOSIS — F33.9 RECURRENT MAJOR DEPRESSIVE DISORDER, REMISSION STATUS UNSPECIFIED (HCC): ICD-10-CM

## 2024-03-05 DIAGNOSIS — J44.9 CHRONIC OBSTRUCTIVE PULMONARY DISEASE, UNSPECIFIED COPD TYPE (HCC): ICD-10-CM

## 2024-03-05 DIAGNOSIS — E03.9 HYPOTHYROIDISM, UNSPECIFIED TYPE: ICD-10-CM

## 2024-03-05 PROCEDURE — 99308 SBSQ NF CARE LOW MDM 20: CPT | Performed by: INTERNAL MEDICINE

## 2024-03-25 VITALS
RESPIRATION RATE: 18 BRPM | TEMPERATURE: 97.3 F | HEART RATE: 83 BPM | OXYGEN SATURATION: 96 % | DIASTOLIC BLOOD PRESSURE: 86 MMHG | SYSTOLIC BLOOD PRESSURE: 125 MMHG

## 2024-03-25 ASSESSMENT — ENCOUNTER SYMPTOMS
NAUSEA: 0
ABDOMINAL PAIN: 0
DIARRHEA: 0
COUGH: 1
SHORTNESS OF BREATH: 0
VOMITING: 0

## 2024-03-26 NOTE — PROGRESS NOTES
Visit Date: 3/5/2024 - [facility]  Mari Wilkinsolph  1970  female 53 y.o.      Subjective:    CC: Patient presents with complaint of congestion and muffled hearing. She is already on Mucinex, adding Claritin 10 mg daily x 1 week.      HPI: Patient seen and examined. Medications on chart reviewed. Labs are reviewed. Discussed with nursing staff.      ROS:  Review of Systems   Constitutional:  Negative for chills and fever.   HENT:  Negative for congestion and hearing loss (muffled hearing due to congestion).    Respiratory:  Positive for cough. Negative for shortness of breath.    Cardiovascular:  Negative for chest pain.   Gastrointestinal:  Negative for abdominal pain, diarrhea, nausea and vomiting.   Genitourinary:  Negative for dysuria and frequency.   Musculoskeletal:  Positive for gait problem.   Neurological:  Positive for weakness. Negative for dizziness, light-headedness and headaches.      Current Outpatient Medications   Medication Sig Dispense Refill    vitamin D (CHOLECALCIFEROL) 25 MCG (1000 UT) TABS tablet Take 3 tablets by mouth daily      buPROPion (WELLBUTRIN SR) 150 MG extended release tablet Take 1 tablet by mouth every morning      famotidine (PEPCID) 20 MG tablet Take 1 tablet by mouth 2 times daily      sertraline (ZOLOFT) 50 MG tablet Take 1.5 tablets by mouth daily      ezetimibe (ZETIA) 10 MG tablet Take 1 tablet by mouth nightly      busPIRone (BUSPAR) 5 MG tablet Take 1 tablet by mouth 2 times daily      guaiFENesin (MUCINEX) 600 MG extended release tablet Take 1 tablet by mouth 2 times daily      nystatin (MYCOSTATIN) POWD powder Apply topically 2 times daily Apply to Groin topically every shift      albuterol sulfate HFA (PROVENTIL;VENTOLIN;PROAIR) 108 (90 Base) MCG/ACT inhaler Inhale 2 puffs into the lungs every 4 hours as needed for Shortness of Breath      benzocaine-menthol (CEPACOL SORE THROAT) 15-3.6 MG lozenge Take 1 lozenge by mouth every 2 hours as needed for Sore Throat

## 2024-04-23 ENCOUNTER — OUTSIDE SERVICES (OUTPATIENT)
Dept: INTERNAL MEDICINE CLINIC | Age: 54
End: 2024-04-23
Payer: MEDICARE

## 2024-04-23 DIAGNOSIS — F25.0 SCHIZOAFFECTIVE DISORDER, BIPOLAR TYPE (HCC): ICD-10-CM

## 2024-04-23 DIAGNOSIS — I10 ESSENTIAL HYPERTENSION: ICD-10-CM

## 2024-04-23 DIAGNOSIS — G40.909 NONINTRACTABLE EPILEPSY WITHOUT STATUS EPILEPTICUS, UNSPECIFIED EPILEPSY TYPE (HCC): Primary | ICD-10-CM

## 2024-04-23 DIAGNOSIS — E03.9 HYPOTHYROIDISM, UNSPECIFIED TYPE: ICD-10-CM

## 2024-04-23 DIAGNOSIS — I25.10 ATHEROSCLEROSIS OF NATIVE CORONARY ARTERY OF NATIVE HEART WITHOUT ANGINA PECTORIS: ICD-10-CM

## 2024-04-23 DIAGNOSIS — F31.89 OTHER BIPOLAR DISORDER (HCC): ICD-10-CM

## 2024-04-23 DIAGNOSIS — E78.2 MIXED HYPERLIPIDEMIA: ICD-10-CM

## 2024-04-23 DIAGNOSIS — J44.9 CHRONIC OBSTRUCTIVE PULMONARY DISEASE, UNSPECIFIED COPD TYPE (HCC): ICD-10-CM

## 2024-04-23 DIAGNOSIS — F33.9 RECURRENT MAJOR DEPRESSIVE DISORDER, REMISSION STATUS UNSPECIFIED (HCC): ICD-10-CM

## 2024-04-23 PROCEDURE — 99309 SBSQ NF CARE MODERATE MDM 30: CPT | Performed by: INTERNAL MEDICINE

## 2024-05-13 VITALS
OXYGEN SATURATION: 95 % | RESPIRATION RATE: 16 BRPM | WEIGHT: 220 LBS | BODY MASS INDEX: 41.57 KG/M2 | HEART RATE: 72 BPM | DIASTOLIC BLOOD PRESSURE: 68 MMHG | TEMPERATURE: 97.3 F | SYSTOLIC BLOOD PRESSURE: 126 MMHG

## 2024-05-13 ASSESSMENT — ENCOUNTER SYMPTOMS
DIARRHEA: 0
ABDOMINAL PAIN: 0
NAUSEA: 0
COUGH: 0
VOMITING: 0
SHORTNESS OF BREATH: 0

## 2024-05-14 NOTE — PROGRESS NOTES
History of blood transfusion     Hyperlipidemia     Hypertension     PTSD (post-traumatic stress disorder)     Schizo affective schizophrenia (HCC)     Seizures (HCC)     Thyroid disease         Surgical Hx: reviewed and updated   Past Surgical History:   Procedure Laterality Date    ABDOMEN SURGERY      CHOLECYSTECTOMY      ENDOSCOPY, COLON, DIAGNOSTIC      JOINT REPLACEMENT      SHOULDER SURGERY          FH: reviewed and updated       Problem Relation Age of Onset    High Blood Pressure Mother     No Known Problems Father         SH: reviewed and updated    reports that she has quit smoking. Her smoking use included cigarettes. She has a 14.0 pack-year smoking history. She has never used smokeless tobacco. She reports that she does not drink alcohol and does not use drugs.     Objective:  /68   Pulse 72   Temp 97.3 °F (36.3 °C)   Resp 16   Wt 99.8 kg (220 lb)   LMP 02/13/2014 (Approximate)   SpO2 95%   BMI 41.57 kg/m²      Physical Exam  Constitutional:       General: She is awake. She is not in acute distress.     Appearance: Normal appearance.   HENT:      Head: Normocephalic and atraumatic.      Right Ear: External ear normal.      Left Ear: External ear normal.      Nose: Nose normal.   Eyes:      Extraocular Movements: Extraocular movements intact.      Pupils: Pupils are equal, round, and reactive to light.   Cardiovascular:      Rate and Rhythm: Normal rate and regular rhythm.      Heart sounds: S1 normal and S2 normal. No murmur heard.     No friction rub. No gallop.   Pulmonary:      Breath sounds: Normal breath sounds.      Comments: Clear to ausculation bilaterally  Non-traumatic  Chest:      Chest wall: No tenderness.   Abdominal:      General: Bowel sounds are normal. There is no distension.      Palpations: Abdomen is soft. There is no mass.      Tenderness: There is no abdominal tenderness.      Comments: No organomegaly   Musculoskeletal:         General: No tenderness. Normal range of

## 2024-06-11 ENCOUNTER — OUTSIDE SERVICES (OUTPATIENT)
Dept: INTERNAL MEDICINE CLINIC | Age: 54
End: 2024-06-11

## 2024-06-11 DIAGNOSIS — E78.2 MIXED HYPERLIPIDEMIA: ICD-10-CM

## 2024-06-11 DIAGNOSIS — E03.9 HYPOTHYROIDISM, UNSPECIFIED TYPE: ICD-10-CM

## 2024-06-11 DIAGNOSIS — F25.0 SCHIZOAFFECTIVE DISORDER, BIPOLAR TYPE (HCC): ICD-10-CM

## 2024-06-11 DIAGNOSIS — F33.9 RECURRENT MAJOR DEPRESSIVE DISORDER, REMISSION STATUS UNSPECIFIED (HCC): ICD-10-CM

## 2024-06-11 DIAGNOSIS — I25.10 ATHEROSCLEROSIS OF NATIVE CORONARY ARTERY OF NATIVE HEART WITHOUT ANGINA PECTORIS: ICD-10-CM

## 2024-06-11 DIAGNOSIS — F31.89 OTHER BIPOLAR DISORDER (HCC): ICD-10-CM

## 2024-06-11 DIAGNOSIS — I10 ESSENTIAL HYPERTENSION: Primary | ICD-10-CM

## 2024-06-11 DIAGNOSIS — R56.9 CONVULSIONS, UNSPECIFIED CONVULSION TYPE (HCC): ICD-10-CM

## 2024-06-11 DIAGNOSIS — G40.909 NONINTRACTABLE EPILEPSY WITHOUT STATUS EPILEPTICUS, UNSPECIFIED EPILEPSY TYPE (HCC): ICD-10-CM

## 2024-06-11 DIAGNOSIS — R27.0 ATAXIA, UNSPECIFIED: ICD-10-CM

## 2024-06-11 DIAGNOSIS — J44.9 CHRONIC OBSTRUCTIVE PULMONARY DISEASE, UNSPECIFIED COPD TYPE (HCC): ICD-10-CM

## 2024-06-11 ASSESSMENT — ENCOUNTER SYMPTOMS
DIARRHEA: 0
ABDOMINAL PAIN: 0
NAUSEA: 0
COUGH: 0
VOMITING: 0
SHORTNESS OF BREATH: 0

## 2024-06-11 NOTE — PROGRESS NOTES
Visit Date: 6/11/2024 - [facility]  Mari Hernandezph  1970  female 54 y.o.      Subjective:    CC: Patient feels ok, no complaints    HPI: Patient seen and examined. Medications on chart reviewed. Labs are reviewed. Discussed with nursing staff.      ROS:  Review of Systems   Constitutional:  Negative for chills and fever.   HENT:  Negative for congestion and hearing loss (muffled hearing due to congestion).    Respiratory:  Negative for cough and shortness of breath.    Cardiovascular:  Negative for chest pain.   Gastrointestinal:  Negative for abdominal pain, diarrhea, nausea and vomiting.   Genitourinary:  Negative for dysuria and frequency.   Musculoskeletal:  Positive for gait problem.        Cramping in legs   Neurological:  Positive for weakness. Negative for dizziness, light-headedness and headaches.      Current Outpatient Medications   Medication Sig Dispense Refill    vitamin D (CHOLECALCIFEROL) 25 MCG (1000 UT) TABS tablet Take 3 tablets by mouth daily      buPROPion (WELLBUTRIN SR) 150 MG extended release tablet Take 1 tablet by mouth every morning      hydrOXYzine HCl (ATARAX) 25 MG tablet Take 1 tablet by mouth every 8 hours as needed for Anxiety (Patient not taking: Reported on 3/25/2024)      famotidine (PEPCID) 20 MG tablet Take 1 tablet by mouth 2 times daily      sertraline (ZOLOFT) 50 MG tablet Take 1.5 tablets by mouth daily      ezetimibe (ZETIA) 10 MG tablet Take 1 tablet by mouth nightly      busPIRone (BUSPAR) 5 MG tablet Take 1 tablet by mouth 2 times daily      guaiFENesin (ROBITUSSIN) 100 MG/5ML liquid Take 10 mLs by mouth every 6 hours as needed for Cough (for 7 days) (Patient not taking: Reported on 2/12/2024)      guaiFENesin (MUCINEX) 600 MG extended release tablet Take 1 tablet by mouth 2 times daily      nystatin (MYCOSTATIN) POWD powder Apply topically 2 times daily Apply to Groin topically every shift      loperamide (IMODIUM) 2 MG capsule Take 1 capsule by mouth every 12

## 2024-07-16 ENCOUNTER — OUTSIDE SERVICES (OUTPATIENT)
Dept: INTERNAL MEDICINE CLINIC | Age: 54
End: 2024-07-16
Payer: MEDICARE

## 2024-07-16 DIAGNOSIS — F31.89 OTHER BIPOLAR DISORDER (HCC): ICD-10-CM

## 2024-07-16 DIAGNOSIS — I25.10 ATHEROSCLEROSIS OF NATIVE CORONARY ARTERY OF NATIVE HEART WITHOUT ANGINA PECTORIS: ICD-10-CM

## 2024-07-16 DIAGNOSIS — E03.9 HYPOTHYROIDISM, UNSPECIFIED TYPE: ICD-10-CM

## 2024-07-16 DIAGNOSIS — F25.0 SCHIZOAFFECTIVE DISORDER, BIPOLAR TYPE (HCC): ICD-10-CM

## 2024-07-16 DIAGNOSIS — I10 ESSENTIAL HYPERTENSION: Primary | ICD-10-CM

## 2024-07-16 DIAGNOSIS — J44.9 CHRONIC OBSTRUCTIVE PULMONARY DISEASE, UNSPECIFIED COPD TYPE (HCC): ICD-10-CM

## 2024-07-16 DIAGNOSIS — E78.2 MIXED HYPERLIPIDEMIA: ICD-10-CM

## 2024-07-16 DIAGNOSIS — F33.9 RECURRENT MAJOR DEPRESSIVE DISORDER, REMISSION STATUS UNSPECIFIED (HCC): ICD-10-CM

## 2024-07-16 DIAGNOSIS — G40.909 NONINTRACTABLE EPILEPSY WITHOUT STATUS EPILEPTICUS, UNSPECIFIED EPILEPSY TYPE (HCC): ICD-10-CM

## 2024-07-16 PROCEDURE — 99309 SBSQ NF CARE MODERATE MDM 30: CPT | Performed by: INTERNAL MEDICINE

## 2024-07-16 ASSESSMENT — ENCOUNTER SYMPTOMS
COUGH: 0
SHORTNESS OF BREATH: 0
VOMITING: 0
ABDOMINAL PAIN: 0
NAUSEA: 0
DIARRHEA: 0

## 2024-07-16 NOTE — PROGRESS NOTES
Visit Date: 7/11/2024 - [facility] Nathan Hernandezph  1970  female 54 y.o.      Subjective:    CC: Patient feels ok, no complaints    HPI: Patient seen and examined. Medications on chart reviewed. Labs are reviewed. Discussed with nursing staff.      ROS:  Review of Systems   Constitutional:  Negative for chills and fever.   HENT:  Negative for congestion.    Respiratory:  Negative for cough and shortness of breath.    Cardiovascular:  Negative for chest pain.   Gastrointestinal:  Negative for abdominal pain, diarrhea, nausea and vomiting.   Genitourinary:  Negative for dysuria and frequency.   Musculoskeletal:  Positive for gait problem.        Cramping in legs   Neurological:  Positive for weakness. Negative for dizziness, light-headedness and headaches.      Current Outpatient Medications   Medication Sig Dispense Refill    Omega-3 Fatty Acids (FISH OIL) 1000 MG CPDR Take 3 capsules by mouth      vitamin D (CHOLECALCIFEROL) 25 MCG (1000 UT) TABS tablet Take 3 tablets by mouth daily      buPROPion (WELLBUTRIN SR) 150 MG extended release tablet Take 1 tablet by mouth every morning      famotidine (PEPCID) 20 MG tablet Take 1 tablet by mouth 2 times daily      ezetimibe (ZETIA) 10 MG tablet Take 1 tablet by mouth nightly      busPIRone (BUSPAR) 5 MG tablet Take 1 tablet by mouth 2 times daily      guaiFENesin (MUCINEX) 600 MG extended release tablet Take 400 mg by mouth 2 times daily      nystatin (MYCOSTATIN) POWD powder Apply topically 2 times daily Apply to Groin topically every shift      loperamide (IMODIUM) 2 MG capsule Take 1 capsule by mouth every 12 hours as needed for Diarrhea      diphenhydrAMINE (BENADRYL) 25 MG tablet Take 2 tablets by mouth every 6 hours as needed for Itching      albuterol sulfate HFA (PROVENTIL;VENTOLIN;PROAIR) 108 (90 Base) MCG/ACT inhaler Inhale 2 puffs into the lungs every 4 hours as needed for Shortness of Breath      benzocaine-menthol (CEPACOL SORE THROAT) 15-3.6 MG lozenge

## 2024-09-24 ENCOUNTER — OUTSIDE SERVICES (OUTPATIENT)
Dept: INTERNAL MEDICINE CLINIC | Age: 54
End: 2024-09-24

## 2024-09-24 DIAGNOSIS — I25.10 ATHEROSCLEROSIS OF NATIVE CORONARY ARTERY OF NATIVE HEART WITHOUT ANGINA PECTORIS: ICD-10-CM

## 2024-09-24 DIAGNOSIS — E03.9 HYPOTHYROIDISM, UNSPECIFIED TYPE: ICD-10-CM

## 2024-09-24 DIAGNOSIS — J44.9 CHRONIC OBSTRUCTIVE PULMONARY DISEASE, UNSPECIFIED COPD TYPE (HCC): ICD-10-CM

## 2024-09-24 DIAGNOSIS — F25.0 SCHIZOAFFECTIVE DISORDER, BIPOLAR TYPE (HCC): ICD-10-CM

## 2024-09-24 DIAGNOSIS — F31.89 OTHER BIPOLAR DISORDER (HCC): ICD-10-CM

## 2024-09-24 DIAGNOSIS — E78.2 MIXED HYPERLIPIDEMIA: ICD-10-CM

## 2024-09-24 DIAGNOSIS — I10 ESSENTIAL HYPERTENSION: Primary | ICD-10-CM

## 2024-09-24 DIAGNOSIS — G40.909 NONINTRACTABLE EPILEPSY WITHOUT STATUS EPILEPTICUS, UNSPECIFIED EPILEPSY TYPE (HCC): ICD-10-CM

## 2024-09-24 DIAGNOSIS — F33.9 RECURRENT MAJOR DEPRESSIVE DISORDER, REMISSION STATUS UNSPECIFIED (HCC): ICD-10-CM

## 2024-09-24 ASSESSMENT — ENCOUNTER SYMPTOMS
DIARRHEA: 0
VOMITING: 0
COUGH: 0
SHORTNESS OF BREATH: 0
ABDOMINAL PAIN: 0
NAUSEA: 0

## 2024-10-15 ENCOUNTER — OUTSIDE SERVICES (OUTPATIENT)
Dept: INTERNAL MEDICINE CLINIC | Age: 54
End: 2024-10-15

## 2024-10-15 DIAGNOSIS — F33.9 RECURRENT MAJOR DEPRESSIVE DISORDER, REMISSION STATUS UNSPECIFIED (HCC): ICD-10-CM

## 2024-10-15 DIAGNOSIS — E78.2 MIXED HYPERLIPIDEMIA: ICD-10-CM

## 2024-10-15 DIAGNOSIS — F25.0 SCHIZOAFFECTIVE DISORDER, BIPOLAR TYPE (HCC): ICD-10-CM

## 2024-10-15 DIAGNOSIS — G40.909 NONINTRACTABLE EPILEPSY WITHOUT STATUS EPILEPTICUS, UNSPECIFIED EPILEPSY TYPE (HCC): ICD-10-CM

## 2024-10-15 DIAGNOSIS — R27.0 ATAXIA, UNSPECIFIED: ICD-10-CM

## 2024-10-15 DIAGNOSIS — I25.10 ATHEROSCLEROSIS OF NATIVE CORONARY ARTERY OF NATIVE HEART WITHOUT ANGINA PECTORIS: ICD-10-CM

## 2024-10-15 DIAGNOSIS — R56.9 CONVULSIONS, UNSPECIFIED CONVULSION TYPE (HCC): ICD-10-CM

## 2024-10-15 DIAGNOSIS — I10 ESSENTIAL HYPERTENSION: Primary | ICD-10-CM

## 2024-10-15 DIAGNOSIS — F31.89 OTHER BIPOLAR DISORDER (HCC): ICD-10-CM

## 2024-10-15 DIAGNOSIS — E03.9 HYPOTHYROIDISM, UNSPECIFIED TYPE: ICD-10-CM

## 2024-10-15 DIAGNOSIS — J44.9 CHRONIC OBSTRUCTIVE PULMONARY DISEASE, UNSPECIFIED COPD TYPE (HCC): ICD-10-CM

## 2024-10-15 ASSESSMENT — ENCOUNTER SYMPTOMS
NAUSEA: 0
VOMITING: 0
COUGH: 0
ABDOMINAL PAIN: 0
SHORTNESS OF BREATH: 0
DIARRHEA: 0

## 2024-10-15 NOTE — PROGRESS NOTES
(HCC)     CAD (coronary artery disease)     COPD (chronic obstructive pulmonary disease) (HCC)     Depression     History of blood transfusion     Hyperlipidemia     Hypertension     PTSD (post-traumatic stress disorder)     Schizo affective schizophrenia (HCC)     Seizures (HCC)     Thyroid disease         Surgical Hx: reviewed and updated   Past Surgical History:   Procedure Laterality Date    ABDOMEN SURGERY      CHOLECYSTECTOMY      ENDOSCOPY, COLON, DIAGNOSTIC      JOINT REPLACEMENT      SHOULDER SURGERY          FH: reviewed and updated       Problem Relation Age of Onset    High Blood Pressure Mother     No Known Problems Father         SH: reviewed and updated    reports that she has quit smoking. Her smoking use included cigarettes. She has a 14 pack-year smoking history. She has never used smokeless tobacco. She reports that she does not drink alcohol and does not use drugs.     Objective:    Vitals reviewed, please refer to Nursing home chart     Physical Exam  Constitutional:       General: She is awake. She is not in acute distress.     Appearance: Normal appearance.   HENT:      Head: Normocephalic and atraumatic.      Right Ear: External ear normal.      Left Ear: External ear normal.      Nose: Nose normal.   Eyes:      Extraocular Movements: Extraocular movements intact.      Pupils: Pupils are equal, round, and reactive to light.   Cardiovascular:      Rate and Rhythm: Normal rate and regular rhythm.      Heart sounds: S1 normal and S2 normal. No murmur heard.     No friction rub. No gallop.   Pulmonary:      Breath sounds: Normal breath sounds.      Comments: Clear to ausculation bilaterally  Non-traumatic  Chest:      Chest wall: No tenderness.   Abdominal:      General: Bowel sounds are normal. There is no distension.      Palpations: Abdomen is soft. There is no mass.      Tenderness: There is no abdominal tenderness.      Comments: No organomegaly   Musculoskeletal:         General: No

## 2024-11-12 ENCOUNTER — OUTSIDE SERVICES (OUTPATIENT)
Dept: INTERNAL MEDICINE CLINIC | Age: 54
End: 2024-11-12

## 2024-11-12 DIAGNOSIS — E78.2 MIXED HYPERLIPIDEMIA: ICD-10-CM

## 2024-11-12 DIAGNOSIS — F33.9 RECURRENT MAJOR DEPRESSIVE DISORDER, REMISSION STATUS UNSPECIFIED (HCC): ICD-10-CM

## 2024-11-12 DIAGNOSIS — G40.909 NONINTRACTABLE EPILEPSY WITHOUT STATUS EPILEPTICUS, UNSPECIFIED EPILEPSY TYPE (HCC): ICD-10-CM

## 2024-11-12 DIAGNOSIS — J44.9 CHRONIC OBSTRUCTIVE PULMONARY DISEASE, UNSPECIFIED COPD TYPE (HCC): ICD-10-CM

## 2024-11-12 DIAGNOSIS — I10 ESSENTIAL HYPERTENSION: Primary | ICD-10-CM

## 2024-11-12 DIAGNOSIS — E03.9 HYPOTHYROIDISM, UNSPECIFIED TYPE: ICD-10-CM

## 2024-11-12 DIAGNOSIS — F25.0 SCHIZOAFFECTIVE DISORDER, BIPOLAR TYPE (HCC): ICD-10-CM

## 2024-11-12 DIAGNOSIS — I25.10 ATHEROSCLEROSIS OF NATIVE CORONARY ARTERY OF NATIVE HEART WITHOUT ANGINA PECTORIS: ICD-10-CM

## 2024-11-12 DIAGNOSIS — F31.89 OTHER BIPOLAR DISORDER (HCC): ICD-10-CM

## 2024-11-12 ASSESSMENT — ENCOUNTER SYMPTOMS
VOMITING: 0
ABDOMINAL PAIN: 0
COUGH: 0
DIARRHEA: 0
NAUSEA: 0
SHORTNESS OF BREATH: 0

## 2024-11-12 NOTE — PROGRESS NOTES
Visit Date: 11/12/2024 - [facility] Nathan Hernandezph  1970  female 54 y.o.      Subjective:    CC: Patient feels ok,   No recently reported seizures    HPI: Patient seen and examined. Medications on chart reviewed. Labs are reviewed. Discussed with nursing staff.      ROS:  Review of Systems   Constitutional:  Negative for chills and fever.   HENT:  Negative for congestion.    Respiratory:  Negative for cough and shortness of breath.    Cardiovascular:  Negative for chest pain.   Gastrointestinal:  Negative for abdominal pain, diarrhea, nausea and vomiting.   Genitourinary:  Negative for dysuria and frequency.   Musculoskeletal:  Positive for gait problem.        Cramping in legs   Neurological:  Positive for weakness. Negative for dizziness, light-headedness and headaches.      Current Outpatient Medications   Medication Sig Dispense Refill    traZODone (DESYREL) 50 MG tablet Take 1 tablet by mouth nightly      sertraline (ZOLOFT) 100 MG tablet Take 1 tablet by mouth daily      Omega-3 Fatty Acids (FISH OIL) 1000 MG CPDR Take 3 capsules by mouth      vitamin D (CHOLECALCIFEROL) 25 MCG (1000 UT) TABS tablet Take 3 tablets by mouth daily      buPROPion (WELLBUTRIN SR) 150 MG extended release tablet Take 1 tablet by mouth every morning      famotidine (PEPCID) 20 MG tablet Take 1 tablet by mouth 2 times daily      ezetimibe (ZETIA) 10 MG tablet Take 1 tablet by mouth nightly      busPIRone (BUSPAR) 5 MG tablet Take 1 tablet by mouth 2 times daily      guaiFENesin (MUCINEX) 600 MG extended release tablet Take 400 mg by mouth 2 times daily      nystatin (MYCOSTATIN) POWD powder Apply topically 2 times daily Apply to Groin topically every shift      loperamide (IMODIUM) 2 MG capsule Take 1 capsule by mouth every 12 hours as needed for Diarrhea      diphenhydrAMINE (BENADRYL) 25 MG tablet Take 2 tablets by mouth every 6 hours as needed for Itching      albuterol sulfate HFA (PROVENTIL;VENTOLIN;PROAIR) 108 (90 Base)

## 2025-01-14 ENCOUNTER — OUTSIDE SERVICES (OUTPATIENT)
Dept: INTERNAL MEDICINE CLINIC | Age: 55
End: 2025-01-14
Payer: MEDICARE

## 2025-01-14 DIAGNOSIS — G40.909 NONINTRACTABLE EPILEPSY WITHOUT STATUS EPILEPTICUS, UNSPECIFIED EPILEPSY TYPE (HCC): ICD-10-CM

## 2025-01-14 DIAGNOSIS — F31.89 OTHER BIPOLAR DISORDER (HCC): ICD-10-CM

## 2025-01-14 DIAGNOSIS — F25.0 SCHIZOAFFECTIVE DISORDER, BIPOLAR TYPE (HCC): ICD-10-CM

## 2025-01-14 DIAGNOSIS — I10 ESSENTIAL HYPERTENSION: Primary | ICD-10-CM

## 2025-01-14 DIAGNOSIS — E03.9 HYPOTHYROIDISM, UNSPECIFIED TYPE: ICD-10-CM

## 2025-01-14 DIAGNOSIS — E78.2 MIXED HYPERLIPIDEMIA: ICD-10-CM

## 2025-01-14 DIAGNOSIS — I25.10 ATHEROSCLEROSIS OF NATIVE CORONARY ARTERY OF NATIVE HEART WITHOUT ANGINA PECTORIS: ICD-10-CM

## 2025-01-14 DIAGNOSIS — F33.9 RECURRENT MAJOR DEPRESSIVE DISORDER, REMISSION STATUS UNSPECIFIED (HCC): ICD-10-CM

## 2025-01-14 DIAGNOSIS — J44.9 CHRONIC OBSTRUCTIVE PULMONARY DISEASE, UNSPECIFIED COPD TYPE (HCC): ICD-10-CM

## 2025-01-14 PROCEDURE — 99308 SBSQ NF CARE LOW MDM 20: CPT | Performed by: INTERNAL MEDICINE

## 2025-01-14 ASSESSMENT — ENCOUNTER SYMPTOMS
ABDOMINAL PAIN: 0
COUGH: 0
VOMITING: 0
SHORTNESS OF BREATH: 0
DIARRHEA: 0
NAUSEA: 0

## 2025-01-14 NOTE — PROGRESS NOTES
Visit Date: 1/14/2025 - [facility] Nathan Hernandezph  1970  female 54 y.o.      Subjective:    CC: Patient feels ok,   No recently reported seizures    HPI: Patient seen and examined. Medications on chart reviewed. Labs are reviewed. Discussed with nursing staff.      ROS:  Review of Systems   Constitutional:  Negative for chills and fever.   HENT:  Negative for congestion.    Respiratory:  Negative for cough and shortness of breath.    Cardiovascular:  Negative for chest pain.   Gastrointestinal:  Negative for abdominal pain, diarrhea, nausea and vomiting.   Genitourinary:  Negative for dysuria and frequency.   Musculoskeletal:  Positive for gait problem.        Cramping in legs   Neurological:  Positive for weakness. Negative for dizziness, light-headedness and headaches.      Current Outpatient Medications   Medication Sig Dispense Refill    traZODone (DESYREL) 50 MG tablet Take 1 tablet by mouth nightly      sertraline (ZOLOFT) 100 MG tablet Take 1 tablet by mouth daily      Omega-3 Fatty Acids (FISH OIL) 1000 MG CPDR Take 3 capsules by mouth      vitamin D (CHOLECALCIFEROL) 25 MCG (1000 UT) TABS tablet Take 3 tablets by mouth daily      buPROPion (WELLBUTRIN SR) 150 MG extended release tablet Take 1 tablet by mouth every morning      famotidine (PEPCID) 20 MG tablet Take 1 tablet by mouth 2 times daily      ezetimibe (ZETIA) 10 MG tablet Take 1 tablet by mouth nightly      busPIRone (BUSPAR) 5 MG tablet Take 1 tablet by mouth 2 times daily      guaiFENesin (MUCINEX) 600 MG extended release tablet Take 400 mg by mouth 2 times daily      nystatin (MYCOSTATIN) POWD powder Apply topically 2 times daily Apply to Groin topically every shift      loperamide (IMODIUM) 2 MG capsule Take 1 capsule by mouth every 12 hours as needed for Diarrhea      diphenhydrAMINE (BENADRYL) 25 MG tablet Take 2 tablets by mouth every 6 hours as needed for Itching      albuterol sulfate HFA (PROVENTIL;VENTOLIN;PROAIR) 108 (90 Base)

## 2025-03-11 ENCOUNTER — OUTSIDE SERVICES (OUTPATIENT)
Dept: INTERNAL MEDICINE CLINIC | Age: 55
End: 2025-03-11
Payer: MEDICARE

## 2025-03-11 DIAGNOSIS — I25.10 ATHEROSCLEROSIS OF NATIVE CORONARY ARTERY OF NATIVE HEART WITHOUT ANGINA PECTORIS: ICD-10-CM

## 2025-03-11 DIAGNOSIS — F31.89 OTHER BIPOLAR DISORDER (HCC): ICD-10-CM

## 2025-03-11 DIAGNOSIS — G40.909 NONINTRACTABLE EPILEPSY WITHOUT STATUS EPILEPTICUS, UNSPECIFIED EPILEPSY TYPE (HCC): ICD-10-CM

## 2025-03-11 DIAGNOSIS — J44.9 CHRONIC OBSTRUCTIVE PULMONARY DISEASE, UNSPECIFIED COPD TYPE (HCC): ICD-10-CM

## 2025-03-11 DIAGNOSIS — F25.0 SCHIZOAFFECTIVE DISORDER, BIPOLAR TYPE (HCC): ICD-10-CM

## 2025-03-11 DIAGNOSIS — I10 ESSENTIAL HYPERTENSION: Primary | ICD-10-CM

## 2025-03-11 DIAGNOSIS — F33.9 RECURRENT MAJOR DEPRESSIVE DISORDER, REMISSION STATUS UNSPECIFIED: ICD-10-CM

## 2025-03-11 DIAGNOSIS — E78.2 MIXED HYPERLIPIDEMIA: ICD-10-CM

## 2025-03-11 DIAGNOSIS — E03.9 HYPOTHYROIDISM, UNSPECIFIED TYPE: ICD-10-CM

## 2025-03-11 PROCEDURE — 99309 SBSQ NF CARE MODERATE MDM 30: CPT | Performed by: INTERNAL MEDICINE

## 2025-03-11 ASSESSMENT — ENCOUNTER SYMPTOMS
VOMITING: 0
SHORTNESS OF BREATH: 0
NAUSEA: 0
ABDOMINAL PAIN: 0
DIARRHEA: 0
COUGH: 0

## 2025-03-11 NOTE — PROGRESS NOTES
Visit Date: 3/11/2025 - [facility] Nathan Hernandezph  1970  female 54 y.o.      Subjective:    CC: Patient feels ok,   No recently reported seizures  Complaining of Lt hip pain    HPI: Patient seen and examined. Medications on chart reviewed. Labs are reviewed. Discussed with nursing staff.      ROS:  Review of Systems   Constitutional:  Negative for chills and fever.   HENT:  Negative for congestion.    Respiratory:  Negative for cough and shortness of breath.    Cardiovascular:  Negative for chest pain.   Gastrointestinal:  Negative for abdominal pain, diarrhea, nausea and vomiting.   Genitourinary:  Negative for dysuria and frequency.   Musculoskeletal:  Positive for gait problem.        Cramping in legs   Neurological:  Positive for weakness. Negative for dizziness, light-headedness and headaches.      Current Outpatient Medications   Medication Sig Dispense Refill    traZODone (DESYREL) 50 MG tablet Take 1 tablet by mouth nightly      sertraline (ZOLOFT) 100 MG tablet Take 1 tablet by mouth daily      Omega-3 Fatty Acids (FISH OIL) 1000 MG CPDR Take 3 capsules by mouth      vitamin D (CHOLECALCIFEROL) 25 MCG (1000 UT) TABS tablet Take 3 tablets by mouth daily      buPROPion (WELLBUTRIN SR) 150 MG extended release tablet Take 1 tablet by mouth every morning      famotidine (PEPCID) 20 MG tablet Take 1 tablet by mouth 2 times daily      ezetimibe (ZETIA) 10 MG tablet Take 1 tablet by mouth nightly      busPIRone (BUSPAR) 5 MG tablet Take 1 tablet by mouth 2 times daily      guaiFENesin (MUCINEX) 600 MG extended release tablet Take 400 mg by mouth 2 times daily      nystatin (MYCOSTATIN) POWD powder Apply topically 2 times daily Apply to Groin topically every shift      loperamide (IMODIUM) 2 MG capsule Take 1 capsule by mouth every 12 hours as needed for Diarrhea      diphenhydrAMINE (BENADRYL) 25 MG tablet Take 2 tablets by mouth every 6 hours as needed for Itching      albuterol sulfate HFA

## 2025-05-27 ENCOUNTER — OUTSIDE SERVICES (OUTPATIENT)
Dept: INTERNAL MEDICINE CLINIC | Age: 55
End: 2025-05-27
Payer: MEDICARE

## 2025-05-27 DIAGNOSIS — F31.89 OTHER BIPOLAR DISORDER (HCC): ICD-10-CM

## 2025-05-27 DIAGNOSIS — R56.9 CONVULSIONS, UNSPECIFIED CONVULSION TYPE (HCC): ICD-10-CM

## 2025-05-27 DIAGNOSIS — F33.9 RECURRENT MAJOR DEPRESSIVE DISORDER, REMISSION STATUS UNSPECIFIED: ICD-10-CM

## 2025-05-27 DIAGNOSIS — G40.909 NONINTRACTABLE EPILEPSY WITHOUT STATUS EPILEPTICUS, UNSPECIFIED EPILEPSY TYPE (HCC): ICD-10-CM

## 2025-05-27 DIAGNOSIS — E03.9 HYPOTHYROIDISM, UNSPECIFIED TYPE: ICD-10-CM

## 2025-05-27 DIAGNOSIS — R27.0 ATAXIA, UNSPECIFIED: ICD-10-CM

## 2025-05-27 DIAGNOSIS — I10 ESSENTIAL HYPERTENSION: Primary | ICD-10-CM

## 2025-05-27 DIAGNOSIS — E78.2 MIXED HYPERLIPIDEMIA: ICD-10-CM

## 2025-05-27 DIAGNOSIS — J44.9 CHRONIC OBSTRUCTIVE PULMONARY DISEASE, UNSPECIFIED COPD TYPE (HCC): ICD-10-CM

## 2025-05-27 DIAGNOSIS — F25.0 SCHIZOAFFECTIVE DISORDER, BIPOLAR TYPE (HCC): ICD-10-CM

## 2025-05-27 DIAGNOSIS — I25.10 ATHEROSCLEROSIS OF NATIVE CORONARY ARTERY OF NATIVE HEART WITHOUT ANGINA PECTORIS: ICD-10-CM

## 2025-05-27 PROCEDURE — 99309 SBSQ NF CARE MODERATE MDM 30: CPT | Performed by: INTERNAL MEDICINE

## 2025-05-27 ASSESSMENT — ENCOUNTER SYMPTOMS
COUGH: 0
ABDOMINAL PAIN: 0
DIARRHEA: 0
SHORTNESS OF BREATH: 0
VOMITING: 0
NAUSEA: 0

## 2025-05-27 NOTE — PROGRESS NOTES
Visit Date: 5/27/2025 - [facility] Nathan Hernandezph  1970  female 55 y.o.      Subjective:    CC: Patient feels ok,   No recently reported seizures  No complaints.    HPI: Patient seen and examined. Medications on chart reviewed. Labs are reviewed. Discussed with nursing staff.      ROS:  Review of Systems   Constitutional:  Negative for chills and fever.   HENT:  Negative for congestion.    Respiratory:  Negative for cough and shortness of breath.    Cardiovascular:  Negative for chest pain.   Gastrointestinal:  Negative for abdominal pain, diarrhea, nausea and vomiting.   Genitourinary:  Negative for dysuria and frequency.   Musculoskeletal:  Positive for gait problem.        Cramping in legs   Neurological:  Positive for weakness. Negative for dizziness, light-headedness and headaches.      Current Outpatient Medications   Medication Sig Dispense Refill    traZODone (DESYREL) 50 MG tablet Take 1 tablet by mouth nightly      sertraline (ZOLOFT) 100 MG tablet Take 1 tablet by mouth daily      Omega-3 Fatty Acids (FISH OIL) 1000 MG CPDR Take 3 capsules by mouth      vitamin D (CHOLECALCIFEROL) 25 MCG (1000 UT) TABS tablet Take 3 tablets by mouth daily      buPROPion (WELLBUTRIN SR) 150 MG extended release tablet Take 1 tablet by mouth every morning      famotidine (PEPCID) 20 MG tablet Take 1 tablet by mouth 2 times daily      ezetimibe (ZETIA) 10 MG tablet Take 1 tablet by mouth nightly      busPIRone (BUSPAR) 5 MG tablet Take 1 tablet by mouth 2 times daily      guaiFENesin (MUCINEX) 600 MG extended release tablet Take 400 mg by mouth 2 times daily      nystatin (MYCOSTATIN) POWD powder Apply topically 2 times daily Apply to Groin topically every shift      loperamide (IMODIUM) 2 MG capsule Take 1 capsule by mouth every 12 hours as needed for Diarrhea      diphenhydrAMINE (BENADRYL) 25 MG tablet Take 2 tablets by mouth every 6 hours as needed for Itching      albuterol sulfate HFA (PROVENTIL;VENTOLIN;PROAIR)

## 2025-06-24 ENCOUNTER — OUTSIDE SERVICES (OUTPATIENT)
Dept: INTERNAL MEDICINE CLINIC | Age: 55
End: 2025-06-24

## 2025-06-24 DIAGNOSIS — E03.9 HYPOTHYROIDISM, UNSPECIFIED TYPE: ICD-10-CM

## 2025-06-24 DIAGNOSIS — G40.909 NONINTRACTABLE EPILEPSY WITHOUT STATUS EPILEPTICUS, UNSPECIFIED EPILEPSY TYPE (HCC): ICD-10-CM

## 2025-06-24 DIAGNOSIS — F33.9 RECURRENT MAJOR DEPRESSIVE DISORDER, REMISSION STATUS UNSPECIFIED: ICD-10-CM

## 2025-06-24 DIAGNOSIS — E78.2 MIXED HYPERLIPIDEMIA: ICD-10-CM

## 2025-06-24 DIAGNOSIS — F31.89 OTHER BIPOLAR DISORDER (HCC): ICD-10-CM

## 2025-06-24 DIAGNOSIS — R27.0 ATAXIA, UNSPECIFIED: ICD-10-CM

## 2025-06-24 DIAGNOSIS — F25.0 SCHIZOAFFECTIVE DISORDER, BIPOLAR TYPE (HCC): ICD-10-CM

## 2025-06-24 DIAGNOSIS — I25.10 ATHEROSCLEROSIS OF NATIVE CORONARY ARTERY OF NATIVE HEART WITHOUT ANGINA PECTORIS: ICD-10-CM

## 2025-06-24 DIAGNOSIS — I10 ESSENTIAL HYPERTENSION: Primary | ICD-10-CM

## 2025-06-24 DIAGNOSIS — J44.9 CHRONIC OBSTRUCTIVE PULMONARY DISEASE, UNSPECIFIED COPD TYPE (HCC): ICD-10-CM

## 2025-06-24 ASSESSMENT — ENCOUNTER SYMPTOMS
SHORTNESS OF BREATH: 0
COUGH: 0
DIARRHEA: 0
VOMITING: 0
NAUSEA: 0
ABDOMINAL PAIN: 0

## 2025-06-24 NOTE — PROGRESS NOTES
Visit Date: 6/24/2025 - [facility] Nathan Hernandezph  1970  female 55 y.o.      Subjective:    CC: Patient feels ok,   No recently reported seizures  No complaints.    HPI: Patient seen and examined. Medications on chart reviewed. Labs are reviewed. Discussed with nursing staff.      ROS:  Review of Systems   Constitutional:  Negative for chills and fever.   HENT:  Negative for congestion.    Respiratory:  Negative for cough and shortness of breath.    Cardiovascular:  Negative for chest pain.   Gastrointestinal:  Negative for abdominal pain, diarrhea, nausea and vomiting.   Genitourinary:  Negative for dysuria and frequency.   Musculoskeletal:  Positive for gait problem.        Cramping in legs   Neurological:  Positive for weakness. Negative for dizziness, light-headedness and headaches.      Current Outpatient Medications   Medication Sig Dispense Refill    traZODone (DESYREL) 50 MG tablet Take 1 tablet by mouth nightly      sertraline (ZOLOFT) 100 MG tablet Take 1 tablet by mouth daily      Omega-3 Fatty Acids (FISH OIL) 1000 MG CPDR Take 3 capsules by mouth      vitamin D (CHOLECALCIFEROL) 25 MCG (1000 UT) TABS tablet Take 3 tablets by mouth daily      buPROPion (WELLBUTRIN SR) 150 MG extended release tablet Take 1 tablet by mouth every morning      famotidine (PEPCID) 20 MG tablet Take 1 tablet by mouth 2 times daily      ezetimibe (ZETIA) 10 MG tablet Take 1 tablet by mouth nightly      busPIRone (BUSPAR) 5 MG tablet Take 1 tablet by mouth 2 times daily      guaiFENesin (MUCINEX) 600 MG extended release tablet Take 400 mg by mouth 2 times daily      nystatin (MYCOSTATIN) POWD powder Apply topically 2 times daily Apply to Groin topically every shift      loperamide (IMODIUM) 2 MG capsule Take 1 capsule by mouth every 12 hours as needed for Diarrhea      diphenhydrAMINE (BENADRYL) 25 MG tablet Take 2 tablets by mouth every 6 hours as needed for Itching      albuterol sulfate HFA (PROVENTIL;VENTOLIN;PROAIR)

## 2025-08-05 ENCOUNTER — OUTSIDE SERVICES (OUTPATIENT)
Dept: INTERNAL MEDICINE CLINIC | Age: 55
End: 2025-08-05
Payer: MEDICARE

## 2025-08-05 DIAGNOSIS — F33.9 RECURRENT MAJOR DEPRESSIVE DISORDER, REMISSION STATUS UNSPECIFIED: ICD-10-CM

## 2025-08-05 DIAGNOSIS — R27.0 ATAXIA, UNSPECIFIED: ICD-10-CM

## 2025-08-05 DIAGNOSIS — R56.9 CONVULSIONS, UNSPECIFIED CONVULSION TYPE (HCC): ICD-10-CM

## 2025-08-05 DIAGNOSIS — F25.0 SCHIZOAFFECTIVE DISORDER, BIPOLAR TYPE (HCC): ICD-10-CM

## 2025-08-05 DIAGNOSIS — E78.2 MIXED HYPERLIPIDEMIA: ICD-10-CM

## 2025-08-05 DIAGNOSIS — J44.9 CHRONIC OBSTRUCTIVE PULMONARY DISEASE, UNSPECIFIED COPD TYPE (HCC): ICD-10-CM

## 2025-08-05 DIAGNOSIS — E03.9 HYPOTHYROIDISM, UNSPECIFIED TYPE: ICD-10-CM

## 2025-08-05 DIAGNOSIS — G40.909 NONINTRACTABLE EPILEPSY WITHOUT STATUS EPILEPTICUS, UNSPECIFIED EPILEPSY TYPE (HCC): ICD-10-CM

## 2025-08-05 DIAGNOSIS — I25.10 ATHEROSCLEROSIS OF NATIVE CORONARY ARTERY OF NATIVE HEART WITHOUT ANGINA PECTORIS: ICD-10-CM

## 2025-08-05 DIAGNOSIS — I10 ESSENTIAL HYPERTENSION: Primary | ICD-10-CM

## 2025-08-05 DIAGNOSIS — F31.89 OTHER BIPOLAR DISORDER (HCC): ICD-10-CM

## 2025-08-05 PROCEDURE — 99309 SBSQ NF CARE MODERATE MDM 30: CPT | Performed by: INTERNAL MEDICINE

## 2025-08-05 ASSESSMENT — ENCOUNTER SYMPTOMS
SHORTNESS OF BREATH: 0
COUGH: 0
DIARRHEA: 0
ABDOMINAL PAIN: 0
VOMITING: 0
NAUSEA: 0